# Patient Record
Sex: MALE | Race: WHITE | NOT HISPANIC OR LATINO | URBAN - METROPOLITAN AREA
[De-identification: names, ages, dates, MRNs, and addresses within clinical notes are randomized per-mention and may not be internally consistent; named-entity substitution may affect disease eponyms.]

---

## 2017-07-31 ENCOUNTER — EMERGENCY (EMERGENCY)
Facility: HOSPITAL | Age: 67
LOS: 1 days | Discharge: HOME | End: 2017-07-31
Admitting: EMERGENCY MEDICINE
Payer: MEDICARE

## 2017-07-31 VITALS
OXYGEN SATURATION: 95 % | DIASTOLIC BLOOD PRESSURE: 73 MMHG | SYSTOLIC BLOOD PRESSURE: 134 MMHG | RESPIRATION RATE: 16 BRPM | HEART RATE: 89 BPM

## 2017-07-31 DIAGNOSIS — Z04.8 ENCOUNTER FOR EXAMINATION AND OBSERVATION FOR OTHER SPECIFIED REASONS: ICD-10-CM

## 2017-07-31 PROCEDURE — 99283 EMERGENCY DEPT VISIT LOW MDM: CPT

## 2017-07-31 NOTE — ED ADULT NURSE REASSESSMENT NOTE - NS ED NURSE REASSESS COMMENT FT1
assumed care of patient from [facility]; pt sitting up in stretcher; awake and alert; pt is calling out but answers questions appropriately when asked; security nearby; will continue to monitor

## 2017-07-31 NOTE — ED PROVIDER NOTE - OBJECTIVE STATEMENT
patient BIB EMS after he was found sleeping on the street. patient states that he is in a methadone program and has not taken his medication is 3 days. denies fall, h/a. walks with a rolling walker. state that he went to [facility] 2 days for seeking methadone. patient otherwise uncooperative with history and states, "I want to get the fuck out of here"

## 2017-07-31 NOTE — ED PROVIDER NOTE - MEDICAL DECISION MAKING DETAILS
patient BIB EMS after he was found sleeping in the street. patient is requesting methadone. advised follow up with PCP and methadone clinic patient BIB EMS after he was found sleeping in the street. patient is requesting methadone. verbally abusive with staff. refusing to put on his pants in ED. advised follow up with PCP and methadone clinic

## 2017-07-31 NOTE — ED ADULT TRIAGE NOTE - CHIEF COMPLAINT QUOTE
as per EMS he was found sleeping in the street on his walker, states he has had no methadone in many days

## 2017-08-01 ENCOUNTER — INPATIENT (INPATIENT)
Facility: HOSPITAL | Age: 67
LOS: 8 days | Discharge: PSYCHIATRIC OR SUBSTANCE USE FACILITY | DRG: 91 | End: 2017-08-10
Attending: STUDENT IN AN ORGANIZED HEALTH CARE EDUCATION/TRAINING PROGRAM | Admitting: STUDENT IN AN ORGANIZED HEALTH CARE EDUCATION/TRAINING PROGRAM
Payer: MEDICARE

## 2017-08-01 VITALS
RESPIRATION RATE: 18 BRPM | DIASTOLIC BLOOD PRESSURE: 67 MMHG | OXYGEN SATURATION: 98 % | SYSTOLIC BLOOD PRESSURE: 126 MMHG | TEMPERATURE: 98 F | HEART RATE: 86 BPM

## 2017-08-01 DIAGNOSIS — R63.8 OTHER SYMPTOMS AND SIGNS CONCERNING FOOD AND FLUID INTAKE: ICD-10-CM

## 2017-08-01 DIAGNOSIS — B19.20 UNSPECIFIED VIRAL HEPATITIS C WITHOUT HEPATIC COMA: ICD-10-CM

## 2017-08-01 DIAGNOSIS — N18.9 CHRONIC KIDNEY DISEASE, UNSPECIFIED: ICD-10-CM

## 2017-08-01 DIAGNOSIS — F11.20 OPIOID DEPENDENCE, UNCOMPLICATED: ICD-10-CM

## 2017-08-01 DIAGNOSIS — R41.82 ALTERED MENTAL STATUS, UNSPECIFIED: ICD-10-CM

## 2017-08-01 DIAGNOSIS — K74.60 UNSPECIFIED CIRRHOSIS OF LIVER: ICD-10-CM

## 2017-08-01 LAB
ALBUMIN SERPL ELPH-MCNC: 3.2 G/DL — LOW (ref 3.4–5)
ALP SERPL-CCNC: 56 U/L — SIGNIFICANT CHANGE UP (ref 40–120)
ALT FLD-CCNC: 87 U/L — HIGH (ref 12–42)
AMMONIA BLD-MCNC: 20 UMOL/L — SIGNIFICANT CHANGE UP (ref 11–32)
ANION GAP SERPL CALC-SCNC: 8 MMOL/L — LOW (ref 9–16)
APPEARANCE UR: CLEAR — SIGNIFICANT CHANGE UP
APTT BLD: 30.1 SEC — SIGNIFICANT CHANGE UP (ref 27.5–36.5)
AST SERPL-CCNC: 82 U/L — HIGH (ref 15–37)
BASOPHILS NFR BLD AUTO: 0.5 % — SIGNIFICANT CHANGE UP (ref 0–2)
BILIRUB DIRECT SERPL-MCNC: 0.6 MG/DL — HIGH (ref 0–0.2)
BILIRUB SERPL-MCNC: 1.8 MG/DL — HIGH (ref 0.2–1.2)
BILIRUB UR-MCNC: (no result)
BUN SERPL-MCNC: 30 MG/DL — HIGH (ref 7–23)
CALCIUM SERPL-MCNC: 9.8 MG/DL — SIGNIFICANT CHANGE UP (ref 8.5–10.5)
CHLORIDE SERPL-SCNC: 102 MMOL/L — SIGNIFICANT CHANGE UP (ref 96–108)
CO2 SERPL-SCNC: 28 MMOL/L — SIGNIFICANT CHANGE UP (ref 22–31)
COLOR SPEC: YELLOW — SIGNIFICANT CHANGE UP
CREAT SERPL-MCNC: 1.62 MG/DL — HIGH (ref 0.5–1.3)
DIFF PNL FLD: NEGATIVE — SIGNIFICANT CHANGE UP
EOSINOPHIL NFR BLD AUTO: 0.1 % — SIGNIFICANT CHANGE UP (ref 0–6)
ETHANOL SERPL-MCNC: <3 MG/DL — SIGNIFICANT CHANGE UP
GLUCOSE SERPL-MCNC: 213 MG/DL — HIGH (ref 70–99)
GLUCOSE UR QL: NEGATIVE — SIGNIFICANT CHANGE UP
HCT VFR BLD CALC: 31.6 % — LOW (ref 39–50)
HGB BLD-MCNC: 11.2 G/DL — LOW (ref 13–17)
IMM GRANULOCYTES NFR BLD AUTO: 0.5 % — SIGNIFICANT CHANGE UP (ref 0–1.5)
INR BLD: 1.18 — HIGH (ref 0.88–1.16)
KETONES UR-MCNC: (no result) MG/DL
LACTATE SERPL-SCNC: 1.5 MMOL/L — SIGNIFICANT CHANGE UP (ref 0.4–2)
LEUKOCYTE ESTERASE UR-ACNC: NEGATIVE — SIGNIFICANT CHANGE UP
LYMPHOCYTES # BLD AUTO: 15.3 % — SIGNIFICANT CHANGE UP (ref 13–44)
MAGNESIUM SERPL-MCNC: 1.3 MG/DL — LOW (ref 1.6–2.6)
MCHC RBC-ENTMCNC: 32.1 PG — SIGNIFICANT CHANGE UP (ref 27–34)
MCHC RBC-ENTMCNC: 35.4 G/DL — SIGNIFICANT CHANGE UP (ref 32–36)
MCV RBC AUTO: 90.5 FL — SIGNIFICANT CHANGE UP (ref 80–100)
MONOCYTES NFR BLD AUTO: 7.8 % — SIGNIFICANT CHANGE UP (ref 2–14)
NEUTROPHILS NFR BLD AUTO: 75.8 % — SIGNIFICANT CHANGE UP (ref 43–77)
NITRITE UR-MCNC: NEGATIVE — SIGNIFICANT CHANGE UP
PH UR: 5.5 — SIGNIFICANT CHANGE UP (ref 5–8)
PLATELET # BLD AUTO: 218 K/UL — SIGNIFICANT CHANGE UP (ref 150–400)
POTASSIUM SERPL-MCNC: 4.4 MMOL/L — SIGNIFICANT CHANGE UP (ref 3.5–5.3)
POTASSIUM SERPL-SCNC: 4.4 MMOL/L — SIGNIFICANT CHANGE UP (ref 3.5–5.3)
PROT SERPL-MCNC: 7.6 G/DL — SIGNIFICANT CHANGE UP (ref 6.4–8.2)
PROT UR-MCNC: 30 MG/DL
PROTHROM AB SERPL-ACNC: 13 SEC — HIGH (ref 9.8–12.7)
RBC # BLD: 3.49 M/UL — LOW (ref 4.2–5.8)
RBC # FLD: 13.5 % — SIGNIFICANT CHANGE UP (ref 10.3–16.9)
SODIUM SERPL-SCNC: 138 MMOL/L — SIGNIFICANT CHANGE UP (ref 132–145)
SP GR SPEC: >=1.03 — SIGNIFICANT CHANGE UP (ref 1–1.03)
UROBILINOGEN FLD QL: 4 E.U./DL
WBC # BLD: 8.1 K/UL — SIGNIFICANT CHANGE UP (ref 3.8–10.5)
WBC # FLD AUTO: 8.1 K/UL — SIGNIFICANT CHANGE UP (ref 3.8–10.5)

## 2017-08-01 PROCEDURE — 71010: CPT | Mod: 26

## 2017-08-01 PROCEDURE — 70450 CT HEAD/BRAIN W/O DYE: CPT | Mod: 26

## 2017-08-01 PROCEDURE — 90792 PSYCH DIAG EVAL W/MED SRVCS: CPT

## 2017-08-01 PROCEDURE — 99285 EMERGENCY DEPT VISIT HI MDM: CPT

## 2017-08-01 PROCEDURE — 99223 1ST HOSP IP/OBS HIGH 75: CPT | Mod: GC

## 2017-08-01 RX ORDER — THIAMINE MONONITRATE (VIT B1) 100 MG
100 TABLET ORAL ONCE
Qty: 0 | Refills: 0 | Status: COMPLETED | OUTPATIENT
Start: 2017-08-01 | End: 2017-08-01

## 2017-08-01 RX ORDER — SODIUM CHLORIDE 9 MG/ML
1000 INJECTION INTRAMUSCULAR; INTRAVENOUS; SUBCUTANEOUS ONCE
Qty: 0 | Refills: 0 | Status: COMPLETED | OUTPATIENT
Start: 2017-08-01 | End: 2017-08-01

## 2017-08-01 RX ORDER — DEXTROSE 50 % IN WATER 50 %
12.5 SYRINGE (ML) INTRAVENOUS ONCE
Qty: 0 | Refills: 0 | Status: DISCONTINUED | OUTPATIENT
Start: 2017-08-01 | End: 2017-08-04

## 2017-08-01 RX ORDER — DEXTROSE 50 % IN WATER 50 %
25 SYRINGE (ML) INTRAVENOUS ONCE
Qty: 0 | Refills: 0 | Status: DISCONTINUED | OUTPATIENT
Start: 2017-08-01 | End: 2017-08-04

## 2017-08-01 RX ORDER — ALPRAZOLAM 0.25 MG
2 TABLET ORAL ONCE
Qty: 0 | Refills: 0 | Status: DISCONTINUED | OUTPATIENT
Start: 2017-08-01 | End: 2017-08-01

## 2017-08-01 RX ORDER — GLUCAGON INJECTION, SOLUTION 0.5 MG/.1ML
1 INJECTION, SOLUTION SUBCUTANEOUS ONCE
Qty: 0 | Refills: 0 | Status: DISCONTINUED | OUTPATIENT
Start: 2017-08-01 | End: 2017-08-04

## 2017-08-01 RX ORDER — METHADONE HYDROCHLORIDE 40 MG/1
210 TABLET ORAL ONCE
Qty: 0 | Refills: 0 | Status: DISCONTINUED | OUTPATIENT
Start: 2017-08-01 | End: 2017-08-01

## 2017-08-01 RX ORDER — DEXTROSE 50 % IN WATER 50 %
1 SYRINGE (ML) INTRAVENOUS ONCE
Qty: 0 | Refills: 0 | Status: DISCONTINUED | OUTPATIENT
Start: 2017-08-01 | End: 2017-08-04

## 2017-08-01 RX ORDER — SODIUM CHLORIDE 9 MG/ML
1000 INJECTION INTRAMUSCULAR; INTRAVENOUS; SUBCUTANEOUS
Qty: 0 | Refills: 0 | Status: DISCONTINUED | OUTPATIENT
Start: 2017-08-01 | End: 2017-08-04

## 2017-08-01 RX ORDER — SODIUM CHLORIDE 9 MG/ML
1000 INJECTION, SOLUTION INTRAVENOUS
Qty: 0 | Refills: 0 | Status: DISCONTINUED | OUTPATIENT
Start: 2017-08-01 | End: 2017-08-04

## 2017-08-01 RX ORDER — MAGNESIUM SULFATE 500 MG/ML
4 VIAL (ML) INJECTION ONCE
Qty: 0 | Refills: 0 | Status: DISCONTINUED | OUTPATIENT
Start: 2017-08-01 | End: 2017-08-04

## 2017-08-01 RX ORDER — HALOPERIDOL DECANOATE 100 MG/ML
2 INJECTION INTRAMUSCULAR EVERY 4 HOURS
Qty: 0 | Refills: 0 | Status: DISCONTINUED | OUTPATIENT
Start: 2017-08-01 | End: 2017-08-04

## 2017-08-01 RX ORDER — METHADONE HYDROCHLORIDE 40 MG/1
100 TABLET ORAL DAILY
Qty: 0 | Refills: 0 | Status: DISCONTINUED | OUTPATIENT
Start: 2017-08-02 | End: 2017-08-02

## 2017-08-01 RX ORDER — BACITRACIN ZINC 500 UNIT/G
1 OINTMENT IN PACKET (EA) TOPICAL ONCE
Qty: 0 | Refills: 0 | Status: COMPLETED | OUTPATIENT
Start: 2017-08-01 | End: 2017-08-01

## 2017-08-01 RX ORDER — THIAMINE MONONITRATE (VIT B1) 100 MG
100 TABLET ORAL THREE TIMES A DAY
Qty: 0 | Refills: 0 | Status: DISCONTINUED | OUTPATIENT
Start: 2017-08-01 | End: 2017-08-10

## 2017-08-01 RX ORDER — HALOPERIDOL DECANOATE 100 MG/ML
0.5 INJECTION INTRAMUSCULAR EVERY 4 HOURS
Qty: 0 | Refills: 0 | Status: DISCONTINUED | OUTPATIENT
Start: 2017-08-01 | End: 2017-08-02

## 2017-08-01 RX ORDER — INSULIN LISPRO 100/ML
VIAL (ML) SUBCUTANEOUS
Qty: 0 | Refills: 0 | Status: DISCONTINUED | OUTPATIENT
Start: 2017-08-01 | End: 2017-08-04

## 2017-08-01 RX ADMIN — Medication 100 MILLIGRAM(S): at 02:54

## 2017-08-01 RX ADMIN — Medication 1 APPLICATION(S): at 04:01

## 2017-08-01 RX ADMIN — Medication 100 MILLIGRAM(S): at 22:42

## 2017-08-01 RX ADMIN — SODIUM CHLORIDE 1000 MILLILITER(S): 9 INJECTION INTRAMUSCULAR; INTRAVENOUS; SUBCUTANEOUS at 02:42

## 2017-08-01 RX ADMIN — SODIUM CHLORIDE 1000 MILLILITER(S): 9 INJECTION INTRAMUSCULAR; INTRAVENOUS; SUBCUTANEOUS at 04:31

## 2017-08-01 RX ADMIN — Medication 2 MILLIGRAM(S): at 07:43

## 2017-08-01 RX ADMIN — METHADONE HYDROCHLORIDE 210 MILLIGRAM(S): 40 TABLET ORAL at 12:08

## 2017-08-01 RX ADMIN — Medication 2 MILLIGRAM(S): at 02:54

## 2017-08-01 NOTE — PROVIDER CONTACT NOTE (OTHER) - ASSESSMENT
Patient ambulated safely to bathroom with aide at side, and patient tried to stand up from toilet and legs were weak, nursing assistant was able to safely hold patient up without him falling and assisted into a chair by nursing staff. Vitals 124/71 HR 98 Oxygen 94% on room air. MD Childress to bedside and no intervention at this time. Patient received large home dose of methadone earlier and was combative and anxious. Will monitor.

## 2017-08-01 NOTE — ED PROVIDER NOTE - PHYSICAL EXAMINATION
patient refusing to be examine, verbally abusive with staff  noted to have abrasion to the top of the head and L forarm, dry, no active bleeding

## 2017-08-01 NOTE — H&P ADULT - NSHPPHYSICALEXAM_GEN_ALL_CORE
Patient declined majority of physical exam.     Patient was seated over side of bed, agitated, non-compliant with questions or exam.   He is unkempt - there is an abrasion over his head that he asked us to feel and another on his left arm and back.     Patient has a tremor at rest - no evidence of flap asterixis.    A&O x 2-3 (knows name, date, and that he is in the hospital, but not [facility]).

## 2017-08-01 NOTE — BEHAVIORAL HEALTH ASSESSMENT NOTE - NSBHCHARTREVIEWVS_PSY_A_CORE FT
Vital Signs Last 24 Hrs  T(C): 36.6 (01 Aug 2017 09:35), Max: 36.7 (01 Aug 2017 01:23)  T(F): 97.8 (01 Aug 2017 09:35), Max: 98.1 (01 Aug 2017 05:58)  HR: 78 (01 Aug 2017 09:35) (77 - 89)  BP: 135/65 (01 Aug 2017 09:35) (126/67 - 139/79)  BP(mean): --  RR: 16 (01 Aug 2017 09:35) (16 - 18)  SpO2: 98% (01 Aug 2017 09:35) (95% - 98%)

## 2017-08-01 NOTE — ED PROVIDER NOTE - PROGRESS NOTE DETAILS
patient refusing CT. his son was contacted and will be coming to the ED to pick him up. police at bedside as he has been reported as a missing person patient's son came to ED. states that he has history of ex heroine use on methadone 240mg daily, has not taken his methadone in 4 days, hep c new diagnosed cirrhosis, mild dementia. has been in 3 EDs in the past week [facility]  for agitiation. states that he has been acting confused, paranoid, and agitated for the past week. patient's son came to ED. states that he has history of ex heroine use on methadone 240mg daily, has not taken his methadone in 4 days, hep c new diagnosed cirrhosis, CKD, mild dementia. has been in 3 EDs in the past week [facility]  for agitiation. states that he has been acting confused, paranoid, and agitated for the past week. spoke with neurosurgery PA, recommending 3 hour repeat CT

## 2017-08-01 NOTE — H&P ADULT - ATTENDING COMMENTS
Pt seen by me in the room. Refused my exam. Agree with housestaff's exam/a/p as noted above with collateral information obtained via son. Appreciate psych consult with assistance of methadone management. labs reviewed.   a/p:  1. Encelopathy likely 2/2 worsening of dementia: check reversible causes including B12/folate/RPR/TSH; obtain collateral information from PMD;   2. hx of IVDU: decrease methadone as per psych, haldol prn for agitation, monitor for BDZ w/d  3. ?Hep C cirrhosis  4. ISAIAS: trend cr  SW consult in AM

## 2017-08-01 NOTE — ED ADULT NURSE NOTE - CHIEF COMPLAINT QUOTE
Pt BIBA states he tripped, near [address] [facility] and [address] [place] [address]. Abrasions noted to hi L forearm, pt states it was from a previous incident

## 2017-08-01 NOTE — ED PROVIDER NOTE - MEDICAL DECISION MAKING DETAILS
patient BIB EMS for fall, refusing to be evaluated. patient BIB EMS for fall this evening PMHx Hep C, cirrhosis, dementia on methadone with worsening mental status changes. CT head shows cortical contusion in the frontal lobe.

## 2017-08-01 NOTE — H&P ADULT - PROBLEM SELECTOR PLAN 4
Patient's son reports diagnosed, but untreated Hepatitis C.   Patient says he was diagnosed 7 years ago.

## 2017-08-01 NOTE — H&P ADULT - ASSESSMENT
65yo male PMHx chronic methadone use in the setting of previous heroin abuse, Hep C (context of diagnosis unknown, untreated), recently diagnosed liver cirrhosis, CKD, and mild dementia who presented as a transfer from [facility] ED with history of a fall and altered mentation, agitation, and paranoia. His son reported him as a missing person to the Police one week ago. Given limited capacity, patient should be evaluated by psych for altered mental status.

## 2017-08-01 NOTE — BEHAVIORAL HEALTH ASSESSMENT NOTE - NSBHCHARTREVIEWLAB_PSY_A_CORE FT
11.2   8.1   )-----------( 218      ( 01 Aug 2017 02:59 )             31.6   08-01    138  |  102  |  30<H>  ----------------------------<  213<H>  4.4   |  28  |  1.62<H>    Ca    9.8      01 Aug 2017 02:59  Mg     1.3     08-01    TPro  7.6  /  Alb  3.2<L>  /  TBili  1.8<H>  /  DBili  x   /  AST  82<H>  /  ALT  87<H>  /  AlkPhos  56  08-01    alcohol level negative

## 2017-08-01 NOTE — ED PROVIDER NOTE - OBJECTIVE STATEMENT
patient BIB EMS for fall from his rolling walker. states that he was sitting on his rolling walking and fell. hitting the back of his head, and L arm. denies LOC dizziness. patient other wise uncooperative with history and exam. patient seen earlier in this ED after he was found sleeping in the street.

## 2017-08-01 NOTE — DIETITIAN INITIAL EVALUATION ADULT. - ENERGY NEEDS
IBW 75.5Kg  %IBW 81%  BMI 19.4    Utilized ABW to calculate needs, pt falls within % of IBW. Adjusted for age.

## 2017-08-01 NOTE — ED PROVIDER NOTE - ATTENDING CONTRIBUTION TO CARE
patient BIB EMS for fall from his rolling walker. states that he was sitting on his rolling walking and fell. hitting the back of his head, and L arm. denies LOC dizziness. patient other wise uncooperative with history and exam. patient seen earlier in this ED after he was found sleeping in the street. Son relates pt has become increasingly confused, belligerent over past 3 weeks and was dx'd with dementia a month ago.    VSS, afebrile.    exam: mood hostile, uncooperative with staff/examiner. shouting profanities and belligerent. Pupils 4mm ou and rrla, eomi intact, neg APD, neck supple  lungs: no respiratory distress, clear bilat, cor: rrr without m/c/r.  neuro: neg asterixis, moving all 4 extremities, strength 5/5 UE/LE grossly., Neg dysarthria/ataxia.    Diff dx : Wernicke's, alzheimers, infectious, ICH or mass, metabolic derangement, uti. patient BIB EMS for fall from his rolling walker. states that he was sitting on his rolling walking and fell. hitting the back of his head, and L arm. denies LOC dizziness. patient other wise uncooperative with history and exam. patient seen earlier in this ED after he was found sleeping in the street. Son relates pt has become increasingly confused, belligerent over past 3 weeks and was dx'd with dementia a month ago.    VSS, afebrile.    exam: mood hostile, uncooperative with staff/examiner. shouting profanities and belligerent. Pupils 4mm ou and rrla, eomi intact, neg APD, neck supple  lungs: no respiratory distress, clear bilat, cor: rrr without m/c/r.  neuro: neg asterixis, moving all 4 extremities, strength 5/5 UE/LE grossly., +dysarthria/ataxia.    Diff dx : Wernicke's, alzheimers, infectious, ICH or mass, metabolic derangement, uti.

## 2017-08-01 NOTE — BEHAVIORAL HEALTH ASSESSMENT NOTE - NSBHCONSULTRECOMMENDOTHER_PSY_A_CORE FT
5. Check B12/folate/RPR/TSH for reversible causes of dementia  6. Would advise repeat HCT within 24 hours to monitor cortical contusion  7. Utox 5. Check B12/folate/RPR/TSH for reversible causes of dementia  6. Would advise repeat HCT within 24 hours to monitor cortical contusion  7. Utox  8. Collateral from patient's methadone clinic  5. Check B12/folate/RPR/TSH for reversible causes of dementia  6. Would advise repeat head CT within 24 hours to monitor cortical contusion  7. Utox  8. Collateral from patient's methadone clinic

## 2017-08-01 NOTE — BEHAVIORAL HEALTH ASSESSMENT NOTE - DIFFERENTIAL
Delirium vs. TBI vs. Alzheimers dementia vs. Wernickes vs. vascular dementia Delirium vs. TBI vs. Alzheimers dementia vs. vascular dementia

## 2017-08-01 NOTE — H&P ADULT - NSHPSOCIALHISTORY_GEN_ALL_CORE
Patient reports living at home, but son has not seen him in about a week. Son filed a missing person report with the police.   Per patient he has been on methadone since age 20. Ex heroin user.   Refused to answer re alcohol and smoking.

## 2017-08-01 NOTE — BEHAVIORAL HEALTH ASSESSMENT NOTE - HPI (INCLUDE ILLNESS QUALITY, SEVERITY, DURATION, TIMING, CONTEXT, MODIFYING FACTORS, ASSOCIATED SIGNS AND SYMPTOMS)
66M PPH heroin use disorder on methadone, PMH HCV, CKD, cirrhosis, BIBEMS after fall from walker, found to be agitated, admitted to medicine for evaluation.  HCT significant for small L frontal cortical contusion.  Psychiatry consulted to advise on patient's methadone regimen, management of agitation, and etiology of behavior.    Unable to interview patient as he was deeply asleep in setting of receiving methadone 210 mg one hour earlier.  Per 1:1 at bedside and patient's nurse, patient had attempted to elope twice since admission this morning.  1:1 reports patient fell asleep while eating and had to be physically moved into bed.    Per collateral obtained from patient's son in ED, patient was diagnosed with dementia one month ago and has been increasingly agitated last three weeks.  He has gone to multiple emergency rooms for agitation.  Patient last took methadone on 7/27.    Left message for patient's son Roman at [phone]. 66M PPH heroin use disorder on methadone, PMH HCV, CKD, cirrhosis, BIBEMS after fall from walker, found to be agitated, admitted to medicine for evaluation.  HCT significant for small L frontal cortical contusion.  Psychiatry consulted to advise on patient's methadone regimen, management of agitation, and etiology of behavior.    Unable to interview patient as he was deeply asleep in setting of receiving methadone 210 mg one hour earlier.  Per 1:1 at bedside and patient's nurse, patient had attempted to elope twice since admission this morning.  1:1 reports patient fell asleep while eating and had to be physically moved into bed.    Per collateral obtained from patient's son in ED, patient was diagnosed with dementia one month ago and has been increasingly agitated last three weeks.  He has gone to multiple emergency rooms for agitation.  Patient last took methadone on 7/27.    Spoke to patient's son Roman at [phone].  Roman reports patient was diagnosed with dementia prior to patient's wife's death 2 years ago.  Reports patient has been becoming progressively more paranoid and forgetful and these symptoms worsened in the last two weeks.  Says patient is unable to find his way around or take care of himself.  Patient's  at methadone program has told son that patient requires higher level of care and gave son name of nursing home that takes patients on methadone.  Son reports patient spends his days taking car service to and from methadone clinic, getting a sandwich, and then sleeping.  Reports he also takes Xanax 2 mg PO TID and Klonopin during the day prescribed by his PMD Dr. Bhanu Orozco in [place] although he is unsure how much patient is taking.  Son is unaware of alcohol or current other drug use.  Says yesterday patient left for methadone clinic at 630 am and did not return.  Son was calling ERs to look for him and has no idea how patient made it to [place].  Reports patient made a suicide attempt after his wife's passing 2 years ago via Xanax and Klonopin overdose and was hospitalized psychiatrically at [facility].    Confirmed Xanax 2 mg PO TID and Klonopin 1 mg PO daily as per Istop prescribed by Dr. Orozco. 66M PPH heroin use disorder on methadone, PMH HCV, CKD, cirrhosis, BIBEMS after fall from walker, found to be agitated, admitted to medicine for evaluation.  HCT significant for small L frontal cortical contusion.  Psychiatry consulted to advise on patient's methadone regimen, management of agitation, and etiology of behavior.    Unable to interview patient as he was deeply asleep in setting of receiving methadone 210 mg one hour earlier.  Per 1:1 at bedside and patient's nurse, patient had attempted to elope twice since admission this morning.  1:1 reports patient fell asleep while eating and had to be physically moved into bed.    Per collateral obtained from patient's son in ED, patient was diagnosed with dementia one month ago and has been increasingly agitated last three weeks.  He has gone to multiple emergency rooms for agitation.  Patient last took methadone on 7/27.    Spoke to patient's son Roman at [phone].  Roman reports patient was diagnosed with dementia prior to patient's wife's death 2 years ago.  Reports patient has been becoming progressively more paranoid and forgetful and these symptoms worsened in the last two weeks.  Says patient is unable to find his way around or take care of himself.  Patient's  at methadone program has told son that patient requires higher level of care and gave son name of nursing home that takes patients on methadone.  Son plans to bring 's contact info with him today.  Son reports patient spends his days taking car service to and from methadone clinic, getting a sandwich, and then sleeping.  Reports he also takes Xanax 2 mg PO TID and Klonopin during the day prescribed by his PMD Dr. Bhanu Orozco in [place] although he is unsure how much patient is taking.  Son is unaware of alcohol or current other drug use.  Says yesterday patient left for methadone clinic at 630 am and did not return.  Son was calling ERs to look for him and has no idea how patient made it to [place].  Reports patient made a suicide attempt after his wife's passing 2 years ago via Xanax and Klonopin overdose and was hospitalized psychiatrically at [facility].    Confirmed Xanax 2 mg PO TID and Klonopin 1 mg PO daily as per Istop prescribed by Dr. Orozco.

## 2017-08-01 NOTE — ED ADULT TRIAGE NOTE - CHIEF COMPLAINT QUOTE
Pt BIBA states he tripped, near [address] [facility] and [address] [place] [address]. Abrasions noted to hi L forearm, pt states it was from a previous incident

## 2017-08-01 NOTE — H&P ADULT - NSHPLABSRESULTS_GEN_ALL_CORE
Comprehensive Metabolic Panel (08.01.17 @ 02:59)    Sodium, Serum: 138 mmoL/L    Potassium, Serum: 4.4 mmol/L    Chloride, Serum: 102 mmol/L    Carbon Dioxide, Serum: 28 mmol/L    Anion Gap, Serum: 8 mmoL/L    Blood Urea Nitrogen, Serum: 30 mg/dL    Creatinine, Serum: 1.62 mg/dL    Glucose, Serum: 213 mg/dL    Calcium, Total Serum: 9.8 mg/dL    Protein Total, Serum: 7.6 g/dL    Albumin, Serum: 3.2 g/dL    Bilirubin Total, Serum: 1.8 mg/dL    Alkaline Phosphatase, Serum: 56 U/L    Aspartate Aminotransferase (AST/SGOT): 82 U/L    Alanine Aminotransferase (ALT/SGPT): 87 U/L    eGFR if Non : 44:    Ammonia, Serum (08.01.17 @ 02:59)    Ammonia, Serum: 20 umol/L    Prothrombin Time and INR, Plasma (08.01.17 @ 02:59)    Prothrombin Time, Plasma: 13.0:     INR: 1.18:

## 2017-08-01 NOTE — ED ADULT NURSE REASSESSMENT NOTE - NS ED NURSE REASSESS COMMENT FT1
PT assisted to the bathroom. Given urinal to collect urine sample.
Son at bedside. Pt being investigated for new onset altered mental status according to son. Pt is agitated. To be medicated as per JADON Fitch.
Pt going for xrays
Pts son called as family was looking for patient. Police at bedside as a missing person complaint was filed.

## 2017-08-01 NOTE — PROVIDER CONTACT NOTE (OTHER) - BACKGROUND
Patient Shivani here for methadone management and Hep C workup with increased confusion in room 753.

## 2017-08-01 NOTE — H&P ADULT - PMH
Hepatitis C  Unknown context of diagnosis Cirrhosis of liver    CKD (chronic kidney disease)    Hepatitis C  Unknown context of diagnosis  Methadone use

## 2017-08-01 NOTE — H&P ADULT - PROBLEM SELECTOR PLAN 1
Collateral obtained from son, Roman, contact number with nursing staff. Per Roman, patient has been altered, confused, and agitated for the last two weeks. He went missing one week ago, and Roman filed a missing person report with the Police. He was called by staff in [facility] ED, and he then accompanied patient to [facility] ED. He is due to Collateral obtained from son, Roman, contact number with nursing staff. Per Roman, patient has been altered, confused, and agitated for the last two weeks. He went missing one week ago, and Roman filed a missing person report with the Police. He was called by staff in [facility] ED, and he then accompanied patient to [facility] ED. He is due to see his father later today on the 7th floor.     - Patient received Ativan and Xanax in ED - Avoid Benzodiazepines, they can increase delirium.   - Admin Haloperidol 2mg PO or 0.5mg IV PRN for agitation per Psychiatry   - Patient due to be seen by Psychiatry, f/u recs Collateral obtained from son, Roman, contact number with nursing staff. Per Roman, patient has been altered, confused, and agitated for the last two weeks. He went missing one week ago, and Roman filed a missing person report with the Police. He was called by staff in [facility] ED, and he then accompanied patient to [facility] ED. He is due to see his father later today on the 7th floor.     - Patient received Ativan and Xanax in ED - Avoid Benzodiazepines, they can increase delirium.   - Admin Haloperidol 2mg PO or 0.5mg IV PRN for agitation per Psychiatry   - Patient due to be seen by Psychiatry, f/u recs  - F/u Urine Tox Screen

## 2017-08-01 NOTE — BEHAVIORAL HEALTH ASSESSMENT NOTE - SUMMARY
66M history of heroin use disorder on methadone and reported recent diagnosis of dementia, other psychiatric history unknown, PMH CKD, HCV, cirrhosis, presenting with declining cognitive status and agitation.  Labs and imaging not clearly indicative of etiology of patient's presentation; labs significant for mild renal disfunction and liver disfunction and HCT significant for minor frontal lobe contusion.  Alcohol level negative.  Further collateral is needed from patient's son on course of symptoms.  Would decrease methadone to 100 mg PO daily as patient notably sedated following dose and can give prn haldol as below for agitation.  Would monitor for progression of contusion and check utox and reversible causes of dementia as below.  If possible information about who made diagnosis of dementia and how would be helpful. 66M history of heroin use disorder on methadone and reported diagnosis of dementia, one prior psychiatric admission for SA via OD, PMH CKD, HCV, cirrhosis, presenting with declining cognitive status and agitation.  Labs and imaging not clearly indicative of etiology of patient's presentation; labs significant for mild renal dysfunction and liver dysfunction and HCT significant for minor frontal lobe contusion.  Alcohol level negative.  Based on son's collateral patient's functional status has been gradually declining indicative of dementing process.  Would decrease methadone to 100 mg PO daily as patient notably sedated following dose and can give prn haldol as below for agitation.  Patient is at risk for benzodiazepine withdrawal although unclear how much patient has been taking of Xanax and Klonopin.  Would monitor via CIWA and treat signs of withdrawal >8 with Ativan given liver disease.  Would monitor for progression of contusion and check utox and reversible causes of dementia as below.    Istop [id number]

## 2017-08-01 NOTE — BEHAVIORAL HEALTH ASSESSMENT NOTE - NSBHCONSULTMEDS_PSY_A_CORE FT
1. Would decrease methadone to 100 mg PO daily  2. Would give haldol 2 mg PO or IM q4h prn for agitation; offer PO first and if patient unwilling to take then give IM  3. Would avoid benzodiazepines and other deliriogenic medications (ie anticholingerics, opiates) given risk for worsening delirium/agitation  4. Start thiamine 100 mg PO TID 1. Would decrease methadone to 100 mg PO daily given patient's somnolence  2. Would monitor for signs/symptoms of benzodiazepine withdrawal/CIWA protocol and treat with Ativan given patient's liver dysfunction  3. Would give haldol 2 mg PO or IM q4h prn for agitation; offer PO first and if patient unwilling to take then give IM  4. Start thiamine 100 mg PO TID

## 2017-08-01 NOTE — DIETITIAN INITIAL EVALUATION ADULT. - OTHER INFO
67y/o M admitted s/p fall with AMS. Consult received for FTT. Pt seen OOB in chair with team at bedside. He is agitated and threatening to leave if he does not receive his methadone. RD assessment not appropriate at this time. He appears thin with noticeable loss of lean body mass. Recommend diet advancement with ONS once medically feasible per appropriate consistency. Will follow and reattempt assessment/diet education as appropriate.

## 2017-08-01 NOTE — H&P ADULT - PROBLEM SELECTOR PLAN 2
Patient is an ex heroin user, on daily methadone. Usual dose, per Methadone Clinic is 220mg daily. After calling clinic, they have recommended lowering the dose to 210mg in the setting of liver disease.    - Patient will be receiving 210 mg Methadone PO daily. Patient is an ex heroin user, on daily methadone. Usual dose, per Methadone Clinic is 220mg daily. After calling clinic, they have recommended lowering the dose to 210mg in the setting of liver disease.    - After 210mg, patient was very somnolent, impossible to arouse.   - Psych recommends decreasing dose to 100mg daily.

## 2017-08-01 NOTE — H&P ADULT - PROBLEM SELECTOR PLAN 3
Per son, patient was recently diagnosed with liver cirrhosis. Labs show AST/ALT 82/87 and raised bilirubin.    - Monitor LFTs

## 2017-08-02 DIAGNOSIS — N18.3 CHRONIC KIDNEY DISEASE, STAGE 3 (MODERATE): ICD-10-CM

## 2017-08-02 DIAGNOSIS — B19.20 UNSPECIFIED VIRAL HEPATITIS C WITHOUT HEPATIC COMA: ICD-10-CM

## 2017-08-02 DIAGNOSIS — F03.91 UNSPECIFIED DEMENTIA WITH BEHAVIORAL DISTURBANCE: ICD-10-CM

## 2017-08-02 DIAGNOSIS — G92 TOXIC ENCEPHALOPATHY: ICD-10-CM

## 2017-08-02 DIAGNOSIS — Z29.9 ENCOUNTER FOR PROPHYLACTIC MEASURES, UNSPECIFIED: ICD-10-CM

## 2017-08-02 DIAGNOSIS — F11.21 OPIOID DEPENDENCE, IN REMISSION: ICD-10-CM

## 2017-08-02 DIAGNOSIS — E46 UNSPECIFIED PROTEIN-CALORIE MALNUTRITION: ICD-10-CM

## 2017-08-02 DIAGNOSIS — K74.60 UNSPECIFIED CIRRHOSIS OF LIVER: ICD-10-CM

## 2017-08-02 DIAGNOSIS — R65.10 SYSTEMIC INFLAMMATORY RESPONSE SYNDROME (SIRS) OF NON-INFECTIOUS ORIGIN WITHOUT ACUTE ORGAN DYSFUNCTION: ICD-10-CM

## 2017-08-02 LAB
ALBUMIN SERPL ELPH-MCNC: 2.4 G/DL — LOW (ref 3.3–5)
ALP SERPL-CCNC: 51 U/L — SIGNIFICANT CHANGE UP (ref 40–120)
ALT FLD-CCNC: 52 U/L — HIGH (ref 10–45)
ANION GAP SERPL CALC-SCNC: 11 MMOL/L — SIGNIFICANT CHANGE UP (ref 5–17)
AST SERPL-CCNC: 63 U/L — HIGH (ref 10–40)
BILIRUB DIRECT SERPL-MCNC: 0.5 MG/DL — HIGH (ref 0–0.2)
BILIRUB SERPL-MCNC: 1 MG/DL — SIGNIFICANT CHANGE UP (ref 0.2–1.2)
BUN SERPL-MCNC: 11 MG/DL — SIGNIFICANT CHANGE UP (ref 7–23)
CALCIUM SERPL-MCNC: 8.3 MG/DL — LOW (ref 8.4–10.5)
CHLORIDE SERPL-SCNC: 102 MMOL/L — SIGNIFICANT CHANGE UP (ref 96–108)
CO2 SERPL-SCNC: 25 MMOL/L — SIGNIFICANT CHANGE UP (ref 22–31)
CREAT SERPL-MCNC: 1.1 MG/DL — SIGNIFICANT CHANGE UP (ref 0.5–1.3)
GLUCOSE SERPL-MCNC: 260 MG/DL — HIGH (ref 70–99)
HBA1C BLD-MCNC: 6.7 % — HIGH (ref 4–5.6)
HCT VFR BLD CALC: 27.5 % — LOW (ref 39–50)
HGB BLD-MCNC: 9.1 G/DL — LOW (ref 13–17)
MAGNESIUM SERPL-MCNC: 1.4 MG/DL — LOW (ref 1.6–2.6)
MCHC RBC-ENTMCNC: 31.3 PG — SIGNIFICANT CHANGE UP (ref 27–34)
MCHC RBC-ENTMCNC: 33.1 G/DL — SIGNIFICANT CHANGE UP (ref 32–36)
MCV RBC AUTO: 94.5 FL — SIGNIFICANT CHANGE UP (ref 80–100)
PLATELET # BLD AUTO: 203 K/UL — SIGNIFICANT CHANGE UP (ref 150–400)
POTASSIUM SERPL-MCNC: 4.4 MMOL/L — SIGNIFICANT CHANGE UP (ref 3.5–5.3)
POTASSIUM SERPL-SCNC: 4.4 MMOL/L — SIGNIFICANT CHANGE UP (ref 3.5–5.3)
PROT SERPL-MCNC: 5.9 G/DL — LOW (ref 6–8.3)
RBC # BLD: 2.91 M/UL — LOW (ref 4.2–5.8)
RBC # FLD: 13.8 % — SIGNIFICANT CHANGE UP (ref 10.3–16.9)
SODIUM SERPL-SCNC: 138 MMOL/L — SIGNIFICANT CHANGE UP (ref 135–145)
TSH SERPL-MCNC: 0.55 UIU/ML — SIGNIFICANT CHANGE UP (ref 0.35–4.94)
WBC # BLD: 6.4 K/UL — SIGNIFICANT CHANGE UP (ref 3.8–10.5)
WBC # FLD AUTO: 6.4 K/UL — SIGNIFICANT CHANGE UP (ref 3.8–10.5)

## 2017-08-02 PROCEDURE — 76705 ECHO EXAM OF ABDOMEN: CPT | Mod: 26

## 2017-08-02 PROCEDURE — 99233 SBSQ HOSP IP/OBS HIGH 50: CPT

## 2017-08-02 PROCEDURE — 93010 ELECTROCARDIOGRAM REPORT: CPT

## 2017-08-02 PROCEDURE — 71010: CPT | Mod: 26

## 2017-08-02 PROCEDURE — 70450 CT HEAD/BRAIN W/O DYE: CPT | Mod: 26

## 2017-08-02 RX ORDER — MAGNESIUM SULFATE 500 MG/ML
2 VIAL (ML) INJECTION ONCE
Qty: 0 | Refills: 0 | Status: COMPLETED | OUTPATIENT
Start: 2017-08-02 | End: 2017-08-02

## 2017-08-02 RX ORDER — METHADONE HYDROCHLORIDE 40 MG/1
50 TABLET ORAL ONCE
Qty: 0 | Refills: 0 | Status: DISCONTINUED | OUTPATIENT
Start: 2017-08-02 | End: 2017-08-02

## 2017-08-02 RX ORDER — QUETIAPINE FUMARATE 200 MG/1
50 TABLET, FILM COATED ORAL AT BEDTIME
Qty: 0 | Refills: 0 | Status: DISCONTINUED | OUTPATIENT
Start: 2017-08-02 | End: 2017-08-10

## 2017-08-02 RX ORDER — HALOPERIDOL DECANOATE 100 MG/ML
2 INJECTION INTRAMUSCULAR EVERY 4 HOURS
Qty: 0 | Refills: 0 | Status: DISCONTINUED | OUTPATIENT
Start: 2017-08-02 | End: 2017-08-07

## 2017-08-02 RX ORDER — QUETIAPINE FUMARATE 200 MG/1
100 TABLET, FILM COATED ORAL AT BEDTIME
Qty: 0 | Refills: 0 | Status: DISCONTINUED | OUTPATIENT
Start: 2017-08-02 | End: 2017-08-02

## 2017-08-02 RX ORDER — HEPARIN SODIUM 5000 [USP'U]/ML
5000 INJECTION INTRAVENOUS; SUBCUTANEOUS EVERY 8 HOURS
Qty: 0 | Refills: 0 | Status: DISCONTINUED | OUTPATIENT
Start: 2017-08-02 | End: 2017-08-10

## 2017-08-02 RX ORDER — METHADONE HYDROCHLORIDE 40 MG/1
150 TABLET ORAL DAILY
Qty: 0 | Refills: 0 | Status: DISCONTINUED | OUTPATIENT
Start: 2017-08-03 | End: 2017-08-03

## 2017-08-02 RX ORDER — FOLIC ACID 0.8 MG
1 TABLET ORAL DAILY
Qty: 0 | Refills: 0 | Status: DISCONTINUED | OUTPATIENT
Start: 2017-08-02 | End: 2017-08-10

## 2017-08-02 RX ADMIN — HEPARIN SODIUM 5000 UNIT(S): 5000 INJECTION INTRAVENOUS; SUBCUTANEOUS at 21:34

## 2017-08-02 RX ADMIN — METHADONE HYDROCHLORIDE 100 MILLIGRAM(S): 40 TABLET ORAL at 12:42

## 2017-08-02 RX ADMIN — METHADONE HYDROCHLORIDE 50 MILLIGRAM(S): 40 TABLET ORAL at 19:21

## 2017-08-02 RX ADMIN — Medication 100 GRAM(S): at 19:20

## 2017-08-02 RX ADMIN — QUETIAPINE FUMARATE 50 MILLIGRAM(S): 200 TABLET, FILM COATED ORAL at 21:34

## 2017-08-02 RX ADMIN — Medication 100 MILLIGRAM(S): at 21:35

## 2017-08-02 NOTE — PROGRESS NOTE ADULT - PROBLEM SELECTOR PLAN 7
F: No fluids indicated at this time; patient eating/drinking well  E: Replete electrolytes as needed; K>4; Mg>2  N: DASH diet    DVT ppx: Heparin SubQ 5000 u q8h (moderate risk)  CODE: FULL  Dispo: [id number] -Given patient's history of substance abuse, unlikely meets caloric needs; Nutrition consult called.  -DASH diet; replete electrolytes as needed; K>4, Mg>2 Patient presenting with Creatinine 1.25, GFR 44 on admission (CKD stage 3); unknown baseline. Will attempt to speak with patient's PMD and get additional history/records for patient.  -Creatinine 1.10 today; continue to trend.

## 2017-08-02 NOTE — PROGRESS NOTE ADULT - PROBLEM SELECTOR PROBLEM 6
Nutrition, metabolism, and development symptoms Stage 3 chronic kidney disease Dementia with behavioral disturbance, unspecified dementia type

## 2017-08-02 NOTE — PROGRESS NOTE ADULT - PROBLEM SELECTOR PLAN 6
DASH diet; replete electrolytes as needed; K>4, Mg>2 Patient presenting with Creatinine 1.25. Baseline Patient with reported history of dementia per patient's son. Son notes decline over the last couple of weeks. Will obtain collateral from patient's PMD as to whether patient has any underlying psychiatric issues, dementia.

## 2017-08-02 NOTE — PROGRESS NOTE ADULT - PROBLEM SELECTOR PROBLEM 5
Stage 3 chronic kidney disease Hepatitis C virus infection without hepatic coma, unspecified chronicity

## 2017-08-02 NOTE — PROGRESS NOTE ADULT - PROBLEM SELECTOR PLAN 5
Patient presenting with Creatinine 1.25. Baseline Patient with known history of hepatitis C (unknown diagnosis date, untreated).   -Will attempt to reach out to patient's PMD to obtain additional history regarding PMHx.

## 2017-08-02 NOTE — PROGRESS NOTE ADULT - PROBLEM SELECTOR PLAN 5
Patient is on 220 mg of Methadone maintenance therapy. Considering patient underlying hepatic dysfunction along with presentation of encephalopathy would avoid such high dose due to its long half/life  -Methadone 100 mg PO QD

## 2017-08-02 NOTE — PROGRESS NOTE ADULT - PROBLEM SELECTOR PLAN 9
F: No fluids indicated at this time; patient eating/drinking well in hospital  E: Replete electrolytes as needed; K>4; Mg>2  N: DASH diet    DVT ppx: Heparin SubQ 5000 u q8h (moderate risk)  CODE: FULL  Dispo: [facility]

## 2017-08-02 NOTE — PROGRESS NOTE ADULT - SUBJECTIVE AND OBJECTIVE BOX
Patient is a 66y old  Male who presents with a chief complaint of Fall    INTERVAL HPI/OVERNIGHT EVENTS:    Review of Systems: 12 point review of systems otherwise negative  ( - )fevers/chills  ( - ) dyspnea  ( - ) cough  ( - ) chest pain  ( - ) palpatations  ( - ) dizziness/lightheadedness  ( - ) nausea/vomiting  ( - ) abd pain  ( - ) diarrhea  ( - ) melena  ( - ) hematochezia  ( - ) dysuria  ( - ) hematuria  ( - ) leg swelling  ( -) calf tenderness  ( - ) motor weakness  ( - ) extremity numbness  ( - ) back pain  ( + ) tolerating POs  ( + ) BM    MEDICATIONS  (STANDING):  methadone    Tablet 100 milliGRAM(s) Oral daily  thiamine 100 milliGRAM(s) Oral three times a day  folic acid 1 milliGRAM(s) Oral daily  multivitamin 1 Tablet(s) Oral daily  heparin  Injectable 5000 Unit(s) SubCutaneous every 8 hours  QUEtiapine 50 milliGRAM(s) Oral at bedtime    MEDICATIONS  (PRN):  haloperidol     Tablet 2 milliGRAM(s) Oral every 4 hours PRN agitation  haloperidol    Injectable 2 milliGRAM(s) IntraMuscular every 4 hours PRN Agitation      Allergies    No Known Allergies    Intolerances          Vital Signs Last 24 Hrs  T(C): 37.3 (02 Aug 2017 09:36), Max: 37.9 (01 Aug 2017 22:00)  T(F): 99.1 (02 Aug 2017 09:36), Max: 100.3 (01 Aug 2017 22:00)  HR: 106 (02 Aug 2017 09:36) (98 - 106)  BP: 150/74 (02 Aug 2017 09:36) (124/71 - 152/70)  BP(mean): --  RR: 16 (02 Aug 2017 09:36) (14 - 16)  SpO2: 94% (02 Aug 2017 09:36) (94% - 94%)  CAPILLARY BLOOD GLUCOSE            Physical Exam:    Daily   General:  ill appearing, disheveled  temporal wasting   HEENT:  Nonicteric, PERRLA Dry MMM  CV:  RRR, no murmur, no JVD  Lungs:  CTA B/L, no wheezes, rales, rhonchi  Abdomen:  Soft, non-tender, no distended, positive BS, no hepatosplenomegaly  Extremities:  2+ pulses, no c/c, no edema mild abrasions B/L   Skin:  Warm and dry, no rashes  :  No day  Neuro:  AAOx3, non-focal, CN II-XII grossly intact  No Restraints    LABS:                        11.2   8.1   )-----------( 218      ( 01 Aug 2017 02:59 )             31.6     08-01    138  |  102  |  30<H>  ----------------------------<  213<H>  4.4   |  28  |  1.62<H>    Ca    9.8      01 Aug 2017 02:59  Mg     1.3     08-01    TPro  x   /  Alb  x   /  TBili  x   /  DBili  0.6<H>  /  AST  x   /  ALT  x   /  AlkPhos  x   08-01    PT/INR - ( 01 Aug 2017 02:59 )   PT: 13.0 sec;   INR: 1.18          PTT - ( 01 Aug 2017 02:59 )  PTT:30.1 sec  Urinalysis Basic - ( 01 Aug 2017 05:25 )    Color: Yellow / Appearance: Clear / SG: >=1.030 / pH: x  Gluc: x / Ketone: Trace mg/dL  / Bili: Moderate / Urobili: 4.0 E.U./dL   Blood: x / Protein: 30 mg/dL / Nitrite: NEGATIVE   Leuk Esterase: NEGATIVE / RBC: < 5 /HPF / WBC < 5 /HPF   Sq Epi: x / Non Sq Epi: Rare /HPF / Bacteria: Present /HPF        < from: Xray Chest 1 View AP/PA (08.01.17 @ 02:49) >  XRAY CHEST Right hemithorax volume loss as described above. No prior studies are   available for direct comparison.

## 2017-08-02 NOTE — PROGRESS NOTE ADULT - PROBLEM SELECTOR PLAN 1
Patient presented to [facility] with AMS s/p fall, agitation, paranoia. History of drug abuse (on methadone). Per patient's son, patient has been reported as a missing person for the last week. His behavior has been more erratic over the last two weeks and has been reported to have gone to multiple EDs the past week seeking methadone. AMS likely 2/2 to toxic metabolic encephalopathy of unclear source vs infection.  -Patient Patient presented to [facility] with AMS s/p fall, agitation, paranoia. History of drug abuse (on methadone). Per patient's son, patient has been reported as a missing person for the last week. His behavior has become more erratic over the last two weeks and has been reported to have gone to multiple EDs over the past week seeking methadone. AMS likely 2/2 to toxic metabolic encephalopathy of unclear source vs infectious etiology.  -Patient aggressive overnight, fell and hit his head. Repeat CT head w/o contrast done, no ICH noted.   -Psych on board; patient's behavior unlikely 2/2 to underlying psychiatric disorder. Recommend Seroquel 50 mg qHS for sundowning, Haldol PO 2 mg q4h PRN (first, if agitated overnight) or Haldol IM 2 mg q4h PRN (2nd option for agitation).  -Patient tremulous on exam today; patient with history of Benzo abuse at home (Ativan 2 mg TID), likely 2/2 to withdrawal; continue to monitor patient's CIWA q4h and give Ativan 1 mg PRN.

## 2017-08-02 NOTE — PROGRESS NOTE ADULT - PROBLEM SELECTOR PLAN 3
As per son patient suffers from unspecified dementia.  Has intermittent episodes of severe agitation and confusion.  Will need to obtain collateral from PMD   -Appreciate psychiatry recs

## 2017-08-02 NOTE — PROGRESS NOTE ADULT - PROBLEM SELECTOR PLAN 2
Patient meeting 2/4 SIRS criteria (temp>100.4, tachycardia). Of unclear etiology. Labs pending.   -Examination unremarkable for w/r/r; warm to palpation  -Blood cultures obtained and sent today, f/u results  -CXR Patient meeting 2/4 SIRS criteria (temp 100.3, tachycardia).  -Tmax 100.3, unclear etiology. Examination unremarkable for w/r/r; warm to palpation  -Day labs pending, f/u results   -Blood cultures obtained and sent today, f/u results  -CXR ordered to r/o underlying acute lung pathology that may be responsible for temperature. f/u official read Patient meeting 2/4 SIRS criteria (temp 100.3, tachycardia) overnight.  -Tmax 100.3, unclear etiology. Examination unremarkable for w/r/r; warm to palpation  -UA negative, unlikely urinary source.   -No WBC (6.3 - 8/2), unlikely infectious in etiology  -Blood cultures obtained and sent today, f/u results  -CXR revealing confluent opacity noted over the left lower lung zone which may represent developing left lower lobe pneumonia and/or atelectasis.

## 2017-08-02 NOTE — PROGRESS NOTE BEHAVIORAL HEALTH - NSBHCONSULTRECOMMENDOTHER_PSY_A_CORE FT
4. Coordination with outpatient provider/methadone clinic - potential nursing home placement 4. Repeat head CT  5. Coordination with outpatient provider/methadone clinic - potential nursing home placement 4. Repeat head CT  5. Check EKG for QTc  6. Coordination with outpatient provider/methadone clinic - potential nursing home placement

## 2017-08-02 NOTE — PROGRESS NOTE BEHAVIORAL HEALTH - NSBHFUPINTERVALHXFT_PSY_A_CORE
Overnight events noted as per chart and primary team.  Patient was combative overnight when staff helped him use bathroom.  No prns given.  Patient refused head CT or physical exam.    On interview this morning, patient calm and cooperative, eating breakfast with assistance of aide.  Patient reports he feels "fine" and does not remember events of night before.  Says he will agree to head CT and "whatever the doctors want."  Denies pain, nausea, headache, AH/VH.  AAOx1.  Does not know his own address.  Does not know current location.  Oriented to "17."  Has tremor but says this has been longstanding.    Son Roman at bedside, reports patient's combativeness overnight unsurprising although patient is not typically violent at home.  Says patient typically knows his own address.  Roman plans to call [facility] to check on potential placement today- was given this recommendation by methadone clinic.  Reports patient has not been eating much in last 2 weeks because has been sleeping through much of the day and has difficulty attending to ADLs.  Believes patient has sundowning overnight.

## 2017-08-02 NOTE — PROGRESS NOTE ADULT - PROBLEM SELECTOR PLAN 8
F: No fluids indicated at this time; patient eating/drinking well  E: Replete electrolytes as needed; K>4; Mg>2  N: DASH diet    DVT ppx: Heparin SubQ 5000 u q8h (moderate risk)  CODE: FULL  Dispo: [facility] -Patient's son notes patient has not been eating much in last 2 weeks as has been sleeping through much of the day; given he unlikely is meeting his daily caloric needs, nutrition consult called. f/u recs  -DASH diet; replete electrolytes as needed; K>4, Mg>2

## 2017-08-02 NOTE — PROGRESS NOTE ADULT - PROBLEM SELECTOR PLAN 4
Etiology of cirrhosis appears to be from hepatitis C infection.    Obtain collateral regarding extent of disease (hx of esophageal varices?)  -No ascitics present on my examination  -Keep patient euvolemic  -Obtain collateral  -Abdominal US

## 2017-08-02 NOTE — PROGRESS NOTE ADULT - PROBLEM SELECTOR PLAN 1
Etiology of encephalopathy multifactorial.  Patient having intermittent low grade fevers along with recently diagnosed cirrhosis along with underlying dementia and on high doses of methadone.  Currently mental status improving  -Treat infection if clear source identified  -Keep Euvolemic   -Replete electrolytes as needed  -Avoid sedative medications unless necessary  -B12/folate/RPR/TSH

## 2017-08-02 NOTE — PROGRESS NOTE ADULT - ASSESSMENT
67 y/o male PMHx of prior heroin abuse (on Methadone), Hepatitis C (dx date UNK, untreated), recently diagnosed liver cirrhosis, CKD (stage 3), mild dementia presenting on 8/1 as a transfer for [facility] ED with history of recent fall and altered mentation, agitation, and paranoia. CT head negative for intracranial bleed. AMS likely 2/2 to toxic metabolic encephalopathy of unclear etiology. Here in [facility] [place] for further management.

## 2017-08-02 NOTE — PROGRESS NOTE BEHAVIORAL HEALTH - SUMMARY
66M prior diagnosis of dementia, PMH CKD, HCV, cirrhosis, BIBEMS after leaving home.  Per son patient has years-long history of cognitive deterioration with recent more progressive decline.  Patient's behavioral disregulation likely in setting of sundowning although presentation complicated by history of benzodiazepine and methadone use, with likely some element of dehydration/malnutrition.  Patient did not appear delirious on interview this morning but had significant deficits in memory and orientation.  No evidence so far of benzodiazepine withdrawal.  Given patient's likely diagnosis of dementia, he is not appropriate for inpatient psychiatric admission at this time.  Patient would benefit from repeat head CT and coordination with /son for nursing home placement pending medical clearance.

## 2017-08-02 NOTE — PROGRESS NOTE ADULT - PROBLEM SELECTOR PLAN 4
Patient with known history of hepatitis C (unknown diagnosis date, untreated).   -Will attempt to reach out to patient's PMD to obtain additional history regarding PMHx. Patient with prior history of heroin abuse; on Methadone 220 mg since age 20 (verified with methadone clinic/patient's son). Son notes that patient usually somnolent for the majority of the day after dosing. Will continue with methadone however in light of patient's liver pathology and the drug being hepatically metabolized, will reduce dose to 150 mg daily. Continue to monitor patient for withdrawal symptoms at reduced dose.  -EKG ordered to monitor for QT prolongation, f/u results.

## 2017-08-02 NOTE — PROGRESS NOTE ADULT - ASSESSMENT
65 y/o male PMHx of prior heroin abuse (on Methadone), Hepatitis C (dx date UNK, untreated), recently diagnosed liver cirrhosis, CKD (stage 3), mild dementia presents for AMS
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
## 2017-08-02 NOTE — PROGRESS NOTE BEHAVIORAL HEALTH - NSBHCONSULTMEDS_PSY_A_CORE FT
1. Would start Seroquel 100 mg PO HS for sundowning  2. Continue haldol 2 mg PO or IM q4h prn for agitation  3. Continue to monitor for signs/symptoms of benzodiazepine withdrawal ([facility] protocol) 1. Would start Seroquel 50 mg PO HS for sundowning  2. Continue haldol 2 mg PO or IM q4h prn for agitation  3. Continue to monitor for signs/symptoms of benzodiazepine withdrawal ([facility] protocol)

## 2017-08-02 NOTE — PROGRESS NOTE ADULT - SUBJECTIVE AND OBJECTIVE BOX
OVERNIGHT EVENTS: Patient aggressive and combative with staff overnight when being escorted to the bathroom, fell on bathroom floor, hitting head against the wall. CT Head w/o contrast ordered for AM to r/o intracranial bleed.    SUBJECTIVE / INTERVAL HPI: Patient seen and examined at bedside. Patient does not endorse any pain after fall this morning. Remainder of ROS negative.     VITAL SIGNS:  Vital Signs Last 24 Hrs  T(C): 37.3 (02 Aug 2017 09:36), Max: 37.9 (01 Aug 2017 22:00)  T(F): 99.1 (02 Aug 2017 09:36), Max: 100.3 (01 Aug 2017 22:00)  HR: 106 (02 Aug 2017 09:36) (98 - 106)  BP: 150/74 (02 Aug 2017 09:36) (124/71 - 152/70)  BP(mean): --  RR: 16 (02 Aug 2017 09:36) (14 - 16)  SpO2: 94% (02 Aug 2017 09:36) (94% - 94%)    PHYSICAL EXAM:    General: WDWN, lying in bed, tremulous, not diaphoretic, disheveled  HEENT: NC/AT; PERRL, anicteric sclera; MMM, poor dentition   Neck: supple  Cardiovascular: +S1/S2; RRR, no m/r/g appreciated on auscultation  Respiratory: CTA B/L; no W/R/R  Gastrointestinal: soft, NT/ND; hepatomegaly; no ascites  Extremities:  no edema, clubbing or cyanosis  Vascular: 2+ radial, DP/PT pulses B/L  Neurological: AAOx3; no focal deficits  Derm: no spider angiomas noted, healed scabs noted on legs (likely from prior fall), no other visible lacerations/abrasions noted     MEDICATIONS:  MEDICATIONS  (STANDING):  methadone    Tablet 100 milliGRAM(s) Oral daily  thiamine 100 milliGRAM(s) Oral three times a day  folic acid 1 milliGRAM(s) Oral daily  multivitamin 1 Tablet(s) Oral daily  heparin  Injectable 5000 Unit(s) SubCutaneous every 8 hours  QUEtiapine 50 milliGRAM(s) Oral at bedtime    MEDICATIONS  (PRN):  haloperidol     Tablet 2 milliGRAM(s) Oral every 4 hours PRN agitation  haloperidol    Injectable 2 milliGRAM(s) IntraMuscular every 4 hours PRN Agitation      ALLERGIES:  Allergies    No Known Allergies    Intolerances        LABS:                        11.2   8.1   )-----------( 218      ( 01 Aug 2017 02:59 )             31.6     08-01    138  |  102  |  30<H>  ----------------------------<  213<H>  4.4   |  28  |  1.62<H>    Ca    9.8      01 Aug 2017 02:59  Mg     1.3     08-01    TPro  x   /  Alb  x   /  TBili  x   /  DBili  0.6<H>  /  AST  x   /  ALT  x   /  AlkPhos  x   08-01    PT/INR - ( 01 Aug 2017 02:59 )   PT: 13.0 sec;   INR: 1.18          PTT - ( 01 Aug 2017 02:59 )  PTT:30.1 sec  Urinalysis Basic - ( 01 Aug 2017 05:25 )    Color: Yellow / Appearance: Clear / SG: >=1.030 / pH: x  Gluc: x / Ketone: Trace mg/dL  / Bili: Moderate / Urobili: 4.0 E.U./dL   Blood: x / Protein: 30 mg/dL / Nitrite: NEGATIVE   Leuk Esterase: NEGATIVE / RBC: < 5 /HPF / WBC < 5 /HPF   Sq Epi: x / Non Sq Epi: Rare /HPF / Bacteria: Present /HPF      CAPILLARY BLOOD GLUCOSE  214 (01 Aug 2017 02:07)          RADIOLOGY & ADDITIONAL TESTS: Reviewed.    ASSESSMENT:    PLAN: OVERNIGHT EVENTS: Patient aggressive and combative with staff overnight when being escorted to the bathroom, fell on bathroom floor, hitting head against the wall. CT Head w/o contrast ordered for AM to r/o intracranial bleed.    SUBJECTIVE / INTERVAL HPI: Patient seen and examined at bedside. Patient does not endorse any pain after fall this morning. Remainder of ROS negative.     VITAL SIGNS:  Vital Signs Last 24 Hrs  T(C): 37.3 (02 Aug 2017 09:36), Max: 37.9 (01 Aug 2017 22:00)  T(F): 99.1 (02 Aug 2017 09:36), Max: 100.3 (01 Aug 2017 22:00)  HR: 106 (02 Aug 2017 09:36) (98 - 106)  BP: 150/74 (02 Aug 2017 09:36) (124/71 - 152/70)  BP(mean): --  RR: 16 (02 Aug 2017 09:36) (14 - 16)  SpO2: 94% (02 Aug 2017 09:36) (94% - 94%)    PHYSICAL EXAM:    General: WDWN, lying in bed, tremulous, not diaphoretic, disheveled  HEENT: NC/AT; PERRL, anicteric sclera; MMM, poor dentition, tremor of lower lip   Neck: supple  Cardiovascular: +S1/S2; RRR, no m/r/g appreciated on auscultation  Respiratory: CTA B/L; no W/R/R  Gastrointestinal: soft, NT/ND; hepatomegaly; no ascites  Extremities:  no edema, clubbing or cyanosis  Vascular: 2+ radial, DP/PT pulses B/L  Neurological: AAOx2; no focal deficits  Derm: no spider angiomas noted, healed scabs noted on legs (likely from prior fall), no other visible lacerations/abrasions noted     MEDICATIONS:  MEDICATIONS  (STANDING):  methadone    Tablet 100 milliGRAM(s) Oral daily  thiamine 100 milliGRAM(s) Oral three times a day  folic acid 1 milliGRAM(s) Oral daily  multivitamin 1 Tablet(s) Oral daily  heparin  Injectable 5000 Unit(s) SubCutaneous every 8 hours  QUEtiapine 50 milliGRAM(s) Oral at bedtime    MEDICATIONS  (PRN):  haloperidol     Tablet 2 milliGRAM(s) Oral every 4 hours PRN agitation  haloperidol    Injectable 2 milliGRAM(s) IntraMuscular every 4 hours PRN Agitation      ALLERGIES:  Allergies    No Known Allergies    Intolerances        LABS:                        11.2   8.1   )-----------( 218      ( 01 Aug 2017 02:59 )             31.6     08-01    138  |  102  |  30<H>  ----------------------------<  213<H>  4.4   |  28  |  1.62<H>    Ca    9.8      01 Aug 2017 02:59  Mg     1.3     08-01    TPro  x   /  Alb  x   /  TBili  x   /  DBili  0.6<H>  /  AST  x   /  ALT  x   /  AlkPhos  x   08-01    PT/INR - ( 01 Aug 2017 02:59 )   PT: 13.0 sec;   INR: 1.18          PTT - ( 01 Aug 2017 02:59 )  PTT:30.1 sec  Urinalysis Basic - ( 01 Aug 2017 05:25 )    Color: Yellow / Appearance: Clear / SG: >=1.030 / pH: x  Gluc: x / Ketone: Trace mg/dL  / Bili: Moderate / Urobili: 4.0 E.U./dL   Blood: x / Protein: 30 mg/dL / Nitrite: NEGATIVE   Leuk Esterase: NEGATIVE / RBC: < 5 /HPF / WBC < 5 /HPF   Sq Epi: x / Non Sq Epi: Rare /HPF / Bacteria: Present /HPF      CAPILLARY BLOOD GLUCOSE  214 (01 Aug 2017 02:07)          RADIOLOGY & ADDITIONAL TESTS: Reviewed.    ASSESSMENT:    PLAN:

## 2017-08-02 NOTE — PROGRESS NOTE ADULT - PROBLEM SELECTOR PLAN 2
With with temperature of 100.3 along with tachycardia meeting 2/4 criteria.  Source not clear as UA is not impressive for infection and patient not having urinary complaints additionally chest x-ray unrevealing of a lobar consolidation.  -Will obtain set of Blood Cx   -If continues to spike fever will start empiric abx until source can be identified  -Would continue fluid @80cc/hr

## 2017-08-02 NOTE — PROGRESS NOTE ADULT - PROBLEM SELECTOR PLAN 3
Patient with recent diagnosis of cirrhosis. No known history of esophageal varices; no ascites noted on exam.  -US Abdomen ordered to further characterize liver  -Will attempt to reach out to patient's PMD to obtain additional history regarding PMHx. Patient with recent diagnosis of cirrhosis. No known history of esophageal varices; no ascites noted on exam.  -US Abdomen ordered to further characterize liver morphology  -Will attempt to reach out to patient's PMD to obtain additional history regarding PMHx.

## 2017-08-03 DIAGNOSIS — J69.0 PNEUMONITIS DUE TO INHALATION OF FOOD AND VOMIT: ICD-10-CM

## 2017-08-03 DIAGNOSIS — N17.9 ACUTE KIDNEY FAILURE, UNSPECIFIED: ICD-10-CM

## 2017-08-03 DIAGNOSIS — A41.9 SEPSIS, UNSPECIFIED ORGANISM: ICD-10-CM

## 2017-08-03 LAB
ANION GAP SERPL CALC-SCNC: 10 MMOL/L — SIGNIFICANT CHANGE UP (ref 5–17)
ANION GAP SERPL CALC-SCNC: 13 MMOL/L — SIGNIFICANT CHANGE UP (ref 5–17)
APTT BLD: 31 SEC — SIGNIFICANT CHANGE UP (ref 27.5–37.4)
B-OH-BUTYR SERPL-SCNC: 0 MMOL/L — SIGNIFICANT CHANGE UP
BASE EXCESS BLDA CALC-SCNC: 1.9 MMOL/L — SIGNIFICANT CHANGE UP (ref -2–3)
BLD GP AB SCN SERPL QL: NEGATIVE — SIGNIFICANT CHANGE UP
BUN SERPL-MCNC: 10 MG/DL — SIGNIFICANT CHANGE UP (ref 7–23)
BUN SERPL-MCNC: 11 MG/DL — SIGNIFICANT CHANGE UP (ref 7–23)
CALCIUM SERPL-MCNC: 8.1 MG/DL — LOW (ref 8.4–10.5)
CALCIUM SERPL-MCNC: 8.2 MG/DL — LOW (ref 8.4–10.5)
CHLORIDE SERPL-SCNC: 103 MMOL/L — SIGNIFICANT CHANGE UP (ref 96–108)
CHLORIDE SERPL-SCNC: 103 MMOL/L — SIGNIFICANT CHANGE UP (ref 96–108)
CO2 SERPL-SCNC: 22 MMOL/L — SIGNIFICANT CHANGE UP (ref 22–31)
CO2 SERPL-SCNC: 24 MMOL/L — SIGNIFICANT CHANGE UP (ref 22–31)
CREAT SERPL-MCNC: 1.1 MG/DL — SIGNIFICANT CHANGE UP (ref 0.5–1.3)
CREAT SERPL-MCNC: 1.1 MG/DL — SIGNIFICANT CHANGE UP (ref 0.5–1.3)
FOLATE SERPL-MCNC: 15 NG/ML — SIGNIFICANT CHANGE UP (ref 4.8–24.2)
GAS PNL BLDA: SIGNIFICANT CHANGE UP
GLUCOSE SERPL-MCNC: 177 MG/DL — HIGH (ref 70–99)
GLUCOSE SERPL-MCNC: 316 MG/DL — HIGH (ref 70–99)
HCO3 BLDA-SCNC: 27 MMOL/L — SIGNIFICANT CHANGE UP (ref 21–28)
HCT VFR BLD CALC: 27.3 % — LOW (ref 39–50)
HGB BLD-MCNC: 9.3 G/DL — LOW (ref 13–17)
INR BLD: 1.12 — SIGNIFICANT CHANGE UP (ref 0.88–1.16)
LACTATE SERPL-SCNC: 1.3 MMOL/L — SIGNIFICANT CHANGE UP (ref 0.5–2)
LACTATE SERPL-SCNC: 2.6 MMOL/L — HIGH (ref 0.5–2)
MAGNESIUM SERPL-MCNC: 1.8 MG/DL — SIGNIFICANT CHANGE UP (ref 1.6–2.6)
MAGNESIUM SERPL-MCNC: 2.1 MG/DL — SIGNIFICANT CHANGE UP (ref 1.6–2.6)
MCHC RBC-ENTMCNC: 31.2 PG — SIGNIFICANT CHANGE UP (ref 27–34)
MCHC RBC-ENTMCNC: 34.1 G/DL — SIGNIFICANT CHANGE UP (ref 32–36)
MCV RBC AUTO: 91.6 FL — SIGNIFICANT CHANGE UP (ref 80–100)
PCO2 BLDA: 43 MMHG — SIGNIFICANT CHANGE UP (ref 35–48)
PH BLDA: 7.41 — SIGNIFICANT CHANGE UP (ref 7.35–7.45)
PLATELET # BLD AUTO: 180 K/UL — SIGNIFICANT CHANGE UP (ref 150–400)
PO2 BLDA: 157 MMHG — HIGH (ref 83–108)
POTASSIUM SERPL-MCNC: 4.2 MMOL/L — SIGNIFICANT CHANGE UP (ref 3.5–5.3)
POTASSIUM SERPL-MCNC: 4.6 MMOL/L — SIGNIFICANT CHANGE UP (ref 3.5–5.3)
POTASSIUM SERPL-SCNC: 4.2 MMOL/L — SIGNIFICANT CHANGE UP (ref 3.5–5.3)
POTASSIUM SERPL-SCNC: 4.6 MMOL/L — SIGNIFICANT CHANGE UP (ref 3.5–5.3)
PROTHROM AB SERPL-ACNC: 12.5 SEC — SIGNIFICANT CHANGE UP (ref 9.8–12.7)
RBC # BLD: 2.98 M/UL — LOW (ref 4.2–5.8)
RBC # FLD: 14 % — SIGNIFICANT CHANGE UP (ref 10.3–16.9)
RH IG SCN BLD-IMP: POSITIVE — SIGNIFICANT CHANGE UP
SAO2 % BLDA: 99 % — SIGNIFICANT CHANGE UP (ref 95–100)
SODIUM SERPL-SCNC: 135 MMOL/L — SIGNIFICANT CHANGE UP (ref 135–145)
SODIUM SERPL-SCNC: 140 MMOL/L — SIGNIFICANT CHANGE UP (ref 135–145)
T PALLIDUM AB TITR SER: NEGATIVE — SIGNIFICANT CHANGE UP
VIT B12 SERPL-MCNC: 1589 PG/ML — HIGH (ref 243–894)
WBC # BLD: 5.3 K/UL — SIGNIFICANT CHANGE UP (ref 3.8–10.5)
WBC # FLD AUTO: 5.3 K/UL — SIGNIFICANT CHANGE UP (ref 3.8–10.5)

## 2017-08-03 PROCEDURE — 99233 SBSQ HOSP IP/OBS HIGH 50: CPT

## 2017-08-03 PROCEDURE — 71010: CPT | Mod: 26

## 2017-08-03 RX ORDER — ACETAMINOPHEN 500 MG
325 TABLET ORAL ONCE
Qty: 0 | Refills: 0 | Status: DISCONTINUED | OUTPATIENT
Start: 2017-08-03 | End: 2017-08-04

## 2017-08-03 RX ORDER — INSULIN LISPRO 100/ML
VIAL (ML) SUBCUTANEOUS
Qty: 0 | Refills: 0 | Status: DISCONTINUED | OUTPATIENT
Start: 2017-08-03 | End: 2017-08-10

## 2017-08-03 RX ORDER — PIPERACILLIN AND TAZOBACTAM 4; .5 G/20ML; G/20ML
3.38 INJECTION, POWDER, LYOPHILIZED, FOR SOLUTION INTRAVENOUS EVERY 6 HOURS
Qty: 0 | Refills: 0 | Status: DISCONTINUED | OUTPATIENT
Start: 2017-08-03 | End: 2017-08-05

## 2017-08-03 RX ORDER — ACETAMINOPHEN 500 MG
650 TABLET ORAL EVERY 6 HOURS
Qty: 0 | Refills: 0 | Status: DISCONTINUED | OUTPATIENT
Start: 2017-08-03 | End: 2017-08-03

## 2017-08-03 RX ORDER — DEXTROSE 50 % IN WATER 50 %
25 SYRINGE (ML) INTRAVENOUS ONCE
Qty: 0 | Refills: 0 | Status: DISCONTINUED | OUTPATIENT
Start: 2017-08-03 | End: 2017-08-10

## 2017-08-03 RX ORDER — SODIUM CHLORIDE 9 MG/ML
1000 INJECTION, SOLUTION INTRAVENOUS
Qty: 0 | Refills: 0 | Status: DISCONTINUED | OUTPATIENT
Start: 2017-08-03 | End: 2017-08-10

## 2017-08-03 RX ORDER — MAGNESIUM SULFATE 500 MG/ML
1 VIAL (ML) INJECTION ONCE
Qty: 0 | Refills: 0 | Status: COMPLETED | OUTPATIENT
Start: 2017-08-03 | End: 2017-08-03

## 2017-08-03 RX ORDER — INSULIN GLARGINE 100 [IU]/ML
5 INJECTION, SOLUTION SUBCUTANEOUS AT BEDTIME
Qty: 0 | Refills: 0 | Status: DISCONTINUED | OUTPATIENT
Start: 2017-08-03 | End: 2017-08-04

## 2017-08-03 RX ORDER — SODIUM CHLORIDE 9 MG/ML
500 INJECTION INTRAMUSCULAR; INTRAVENOUS; SUBCUTANEOUS ONCE
Qty: 0 | Refills: 0 | Status: COMPLETED | OUTPATIENT
Start: 2017-08-03 | End: 2017-08-03

## 2017-08-03 RX ORDER — INSULIN HUMAN 100 [IU]/ML
10 INJECTION, SOLUTION SUBCUTANEOUS ONCE
Qty: 0 | Refills: 0 | Status: COMPLETED | OUTPATIENT
Start: 2017-08-03 | End: 2017-08-03

## 2017-08-03 RX ORDER — METHADONE HYDROCHLORIDE 40 MG/1
100 TABLET ORAL DAILY
Qty: 0 | Refills: 0 | Status: DISCONTINUED | OUTPATIENT
Start: 2017-08-04 | End: 2017-08-04

## 2017-08-03 RX ORDER — IPRATROPIUM/ALBUTEROL SULFATE 18-103MCG
3 AEROSOL WITH ADAPTER (GRAM) INHALATION EVERY 6 HOURS
Qty: 0 | Refills: 0 | Status: DISCONTINUED | OUTPATIENT
Start: 2017-08-03 | End: 2017-08-10

## 2017-08-03 RX ORDER — ACETAMINOPHEN 500 MG
650 TABLET ORAL EVERY 6 HOURS
Qty: 0 | Refills: 0 | Status: DISCONTINUED | OUTPATIENT
Start: 2017-08-03 | End: 2017-08-04

## 2017-08-03 RX ORDER — INSULIN LISPRO 100/ML
3 VIAL (ML) SUBCUTANEOUS
Qty: 0 | Refills: 0 | Status: DISCONTINUED | OUTPATIENT
Start: 2017-08-03 | End: 2017-08-03

## 2017-08-03 RX ORDER — INSULIN GLARGINE 100 [IU]/ML
10 INJECTION, SOLUTION SUBCUTANEOUS AT BEDTIME
Qty: 0 | Refills: 0 | Status: DISCONTINUED | OUTPATIENT
Start: 2017-08-03 | End: 2017-08-03

## 2017-08-03 RX ORDER — IPRATROPIUM/ALBUTEROL SULFATE 18-103MCG
3 AEROSOL WITH ADAPTER (GRAM) INHALATION EVERY 6 HOURS
Qty: 0 | Refills: 0 | Status: DISCONTINUED | OUTPATIENT
Start: 2017-08-03 | End: 2017-08-03

## 2017-08-03 RX ORDER — GLUCAGON INJECTION, SOLUTION 0.5 MG/.1ML
1 INJECTION, SOLUTION SUBCUTANEOUS ONCE
Qty: 0 | Refills: 0 | Status: DISCONTINUED | OUTPATIENT
Start: 2017-08-03 | End: 2017-08-10

## 2017-08-03 RX ORDER — DEXTROSE 50 % IN WATER 50 %
12.5 SYRINGE (ML) INTRAVENOUS ONCE
Qty: 0 | Refills: 0 | Status: DISCONTINUED | OUTPATIENT
Start: 2017-08-03 | End: 2017-08-10

## 2017-08-03 RX ORDER — DEXTROSE 50 % IN WATER 50 %
1 SYRINGE (ML) INTRAVENOUS ONCE
Qty: 0 | Refills: 0 | Status: DISCONTINUED | OUTPATIENT
Start: 2017-08-03 | End: 2017-08-10

## 2017-08-03 RX ORDER — SODIUM CHLORIDE 9 MG/ML
1000 INJECTION INTRAMUSCULAR; INTRAVENOUS; SUBCUTANEOUS ONCE
Qty: 0 | Refills: 0 | Status: COMPLETED | OUTPATIENT
Start: 2017-08-03 | End: 2017-08-03

## 2017-08-03 RX ADMIN — METHADONE HYDROCHLORIDE 150 MILLIGRAM(S): 40 TABLET ORAL at 11:34

## 2017-08-03 RX ADMIN — Medication 650 MILLIGRAM(S): at 14:15

## 2017-08-03 RX ADMIN — PIPERACILLIN AND TAZOBACTAM 200 GRAM(S): 4; .5 INJECTION, POWDER, LYOPHILIZED, FOR SOLUTION INTRAVENOUS at 14:27

## 2017-08-03 RX ADMIN — Medication 4: at 22:25

## 2017-08-03 RX ADMIN — Medication 100 GRAM(S): at 11:34

## 2017-08-03 RX ADMIN — QUETIAPINE FUMARATE 50 MILLIGRAM(S): 200 TABLET, FILM COATED ORAL at 22:34

## 2017-08-03 RX ADMIN — Medication 100 MILLIGRAM(S): at 06:24

## 2017-08-03 RX ADMIN — PIPERACILLIN AND TAZOBACTAM 200 GRAM(S): 4; .5 INJECTION, POWDER, LYOPHILIZED, FOR SOLUTION INTRAVENOUS at 18:24

## 2017-08-03 RX ADMIN — Medication 1 MILLIGRAM(S): at 11:33

## 2017-08-03 RX ADMIN — Medication 3 MILLILITER(S): at 22:35

## 2017-08-03 RX ADMIN — Medication 12: at 12:44

## 2017-08-03 RX ADMIN — Medication 1 TABLET(S): at 11:34

## 2017-08-03 RX ADMIN — Medication 100 MILLIGRAM(S): at 14:27

## 2017-08-03 RX ADMIN — HEPARIN SODIUM 5000 UNIT(S): 5000 INJECTION INTRAVENOUS; SUBCUTANEOUS at 22:32

## 2017-08-03 RX ADMIN — SODIUM CHLORIDE 6000 MILLILITER(S): 9 INJECTION INTRAMUSCULAR; INTRAVENOUS; SUBCUTANEOUS at 16:46

## 2017-08-03 RX ADMIN — INSULIN HUMAN 10 UNIT(S): 100 INJECTION, SOLUTION SUBCUTANEOUS at 14:00

## 2017-08-03 RX ADMIN — SODIUM CHLORIDE 4000 MILLILITER(S): 9 INJECTION INTRAMUSCULAR; INTRAVENOUS; SUBCUTANEOUS at 14:21

## 2017-08-03 RX ADMIN — HEPARIN SODIUM 5000 UNIT(S): 5000 INJECTION INTRAVENOUS; SUBCUTANEOUS at 06:24

## 2017-08-03 RX ADMIN — HEPARIN SODIUM 5000 UNIT(S): 5000 INJECTION INTRAVENOUS; SUBCUTANEOUS at 14:27

## 2017-08-03 RX ADMIN — Medication 100 MILLIGRAM(S): at 22:35

## 2017-08-03 RX ADMIN — INSULIN GLARGINE 5 UNIT(S): 100 INJECTION, SOLUTION SUBCUTANEOUS at 22:27

## 2017-08-03 NOTE — PROGRESS NOTE ADULT - PROBLEM SELECTOR PLAN 4
Patient with history of DM, on home medications Levemer 5 u qHS and Janumet 1000/50 daily.   -HbA1c 6.7%  -On ISS Patient with history of DM, on home medications Levemer 5 u qHS and Janumet 1000/50 daily.   -HbA1c 6.7%    -On ISS Patient with history of DM, on home medications Levemer 5 u qHS and Janumet 1000/50 daily.   -HbA1c 6.7%  -On ISS with basal (Lantus 10u qHS)/nutritional dose (Lispro 3 u pre meal)  -continue to monitor FS    -On ISS

## 2017-08-03 NOTE — PHYSICAL THERAPY INITIAL EVALUATION ADULT - PLANNED THERAPY INTERVENTIONS, PT EVAL
gait training/bed mobility training/balance training/transfer training/postural re-education/strengthening

## 2017-08-03 NOTE — PROGRESS NOTE ADULT - ASSESSMENT
65 y/o male PMHx of prior heroin abuse (on Methadone), Hepatitis C (dx date UNK, untreated), recently diagnosed liver cirrhosis, CKD (stage 3), mild dementia presenting on 8/1 as a transfer for [facility] ED with history of recent fall and altered mentation, agitation, and paranoia. CT head negative for intracranial bleed. AMS likely 2/2 to toxic metabolic encephalopathy of unclear etiology. Here in [facility] [place] for further management. 65 y/o male PMHx of prior heroin abuse (on Methadone), Hepatitis C (dx date UNK, untreated), recently diagnosed liver cirrhosis, CKD (stage 3), mild dementia presenting on 8/1 as a transfer for [facility] ED with history of recent fall and altered mentation, agitation, and paranoia. CT head negative for intracranial bleed. AMS likely 2/2 to pseudodementia 2/2 to severe depression vs toxic metabolic encephalopathy of unclear etiology. Here in [place] for further management.

## 2017-08-03 NOTE — PHYSICAL THERAPY INITIAL EVALUATION ADULT - IMPAIRMENTS FOUND, PT EVAL
ventilation and respiration/gas exchange/aerobic capacity/endurance/gait, locomotion, and balance/muscle strength/poor safety awareness/posture/arousal, attention, and cognition

## 2017-08-03 NOTE — PHYSICAL THERAPY INITIAL EVALUATION ADULT - ADDITIONAL COMMENTS
Pt not compliant with answering all questions. Did say that he lives alone in first floor apartment and that his son visits very frequently. Uses a rollator occasionally.

## 2017-08-03 NOTE — PROGRESS NOTE BEHAVIORAL HEALTH - SUMMARY
66M prior diagnosis of dementia, PMH CKD, HCV, cirrhosis, BIBEMS after leaving home.  Per son patient has years-long history of cognitive deterioration with recent more progressive decline.  Patient's behavioral disregulation likely in setting of sundowning although presentation complicated by history of benzodiazepine and methadone use, with some element of dehydration/malnutrition.  No evidence so far of benzodiazepine withdrawal.  Given patient's diagnosis of dementia, he is not appropriate for inpatient psychiatric admission at this time.  Repeat head CT unremarkable.  QTc 446.  If patient is consistently agitated and posing danger to himself and others in the morning, can add on standing Seroquel 25 mg PO at 9am.

## 2017-08-03 NOTE — CHART NOTE - NSCHARTNOTEFT_GEN_A_CORE
Admitting Diagnosis:   Patient is a 66y old  Male who presents with a chief complaint of dementia and agitation     PAST MEDICAL & SURGICAL HISTORY:  Methadone use  Cirrhosis of liver  CKD (chronic kidney disease)  Hepatitis C: Unknown context of diagnosis  No significant past surgical history      Current Nutrition Order:  Dash/TLC    PO Intake:   25-50%    GI Issues:   Denies N/V/D/C    Pain:  Denies at present     Skin Integrity:  back skin tear     Labs:   08-03    135  |  103  |  10  ----------------------------<  177<H>  4.6   |  22  |  1.10    Ca    8.1<L>      03 Aug 2017 06:59  Mg     1.8     08-03    TPro  5.9<L>  /  Alb  2.4<L>  /  TBili  1.0  /  DBili  0.5<H>  /  AST  63<H>  /  ALT  52<H>  /  AlkPhos  51  08-02    CAPILLARY BLOOD GLUCOSE  401 (03 Aug 2017 12:40)          Medications:  MEDICATIONS  (STANDING):  thiamine 100 milliGRAM(s) Oral three times a day  folic acid 1 milliGRAM(s) Oral daily  multivitamin 1 Tablet(s) Oral daily  heparin  Injectable 5000 Unit(s) SubCutaneous every 8 hours  QUEtiapine 50 milliGRAM(s) Oral at bedtime  methadone    Tablet 150 milliGRAM(s) Oral daily  insulin lispro (HumaLOG) corrective regimen sliding scale   SubCutaneous Before meals and at bedtime  dextrose 5%. 1000 milliLiter(s) (50 mL/Hr) IV Continuous <Continuous>  dextrose 50% Injectable 12.5 Gram(s) IV Push once  dextrose 50% Injectable 25 Gram(s) IV Push once  dextrose 50% Injectable 25 Gram(s) IV Push once  sodium chloride 0.9% Bolus 1000 milliLiter(s) IV Bolus once  insulin regular  human recombinant. 10 Unit(s) IV Push once    MEDICATIONS  (PRN):  haloperidol     Tablet 2 milliGRAM(s) Oral every 4 hours PRN agitation  haloperidol    Injectable 2 milliGRAM(s) IntraMuscular every 4 hours PRN Agitation  dextrose Gel 1 Dose(s) Oral once PRN Blood Glucose LESS THAN 70 milliGRAM(s)/deciliter  glucagon  Injectable 1 milliGRAM(s) IntraMuscular once PRN Glucose LESS THAN 70 milligrams/deciliter  acetaminophen   Tablet 650 milliGRAM(s) Oral every 6 hours PRN For Temp greater than 38 C (100.4 F)  acetaminophen  Suppository 650 milliGRAM(s) Rectal every 6 hours PRN For Temp greater than 38 C (100.4 F)      Weight:  61.2kg (8/1)    Weight Change:   Pt reports #; current wt 135#. If accurate pt with 45# wt loss over unspecified time frame. Questionable reliability of information 2/2 mental status.     Estimated energy needs:   61.2kg used for EER: 25-30kcal/kg (1530-1836kcal), 1-1.2g/kg (61-73g), 30-35kml/kg (1836-2142ml).     Subjective:   67y/o M admitted s/p fall with AMS. Pt seen in bed attempting to eat oatmeal, but is very lethargic and falling asleep. Per progress note, pt sedated this am. Recommend only feeding when pt is alert and appropriate. Pt reports a poor appetite and intake; consuming 25-50%. Pt reports no GI distress, but has not desire to eat. Reports not having his teeth, but denies mechanical issues. Pt endorses wt loss, with questionable reliability; nonetheless suspect moderate PCM 2/2 poor intake, wt loss, and noticeable loss of lean body mass. Recommend diet liberalization with ONS. Provide assistance with meals and provide general aspiration precautions. Will follow.      Previous Nutrition Diagnosis:  Predicted suboptimal nutrient intake (specify) RT mental status and BW AEB pt being agitated with BMI 19.4    Active [X]  Resolved [   ]    Goal:  Pt to meet at least 75% of EER    Recommendations:  regular diet + ensure enlive TID  Add MVI  Assistance with meals prn  Monitor lytes and replete prn     Education:   meal encouragement; pt not appropriate for in depth education at time of follow up.     Risk Level: High [X] Moderate [   ] Low [   ]

## 2017-08-03 NOTE — PROVIDER CONTACT NOTE (CHANGE IN STATUS NOTIFICATION) - BACKGROUND
Patient admitted fro medical evaluation, Hx. of Methadone use, Liver cirrhosis, CKD,hep. C, behavior disturbance.

## 2017-08-03 NOTE — PROGRESS NOTE BEHAVIORAL HEALTH - NSBHFUPINTERVALHXFT_PSY_A_CORE
Spoke to patient's overnight and daytime RN.  Patient slept well overnight, cooperated with abdominal ultrasound.  Perseverative regarding wanting full dose of methadone.  Per primary team patient was sedated this morning; received methadone 50 mg and Seroquel 50 mg HS.    On interview at bedside, patient asleep but woke easily to name called.  Slightly irritable but cooperated with interview, then abruptly said he needed to use bathroom.  Explained to patient that he would either need assistance walking to the bathroom or to use bedside urinal- patient was irritated, attempting to get out of bed on his own but then cooperated with aide.

## 2017-08-03 NOTE — PROGRESS NOTE ADULT - PROBLEM SELECTOR PLAN 1
Etiology of encephalopathy multifactorial.  Patient having intermittent  fevers along with recently diagnosed cirrhosis  with underlying dementia unclear if diagnosis confirmed and on high doses of methadone.  Currently mental status improving  -Will treat for possible aspiration PNA  -Keep Euvolemic   -Replete electrolytes as needed  -Avoid sedative medications unless necessary  -B12/folate/RPR/TSH

## 2017-08-03 NOTE — PROGRESS NOTE ADULT - PROBLEM SELECTOR PLAN 5
Etiology of cirrhosis appears to be from hepatitis C infection.    Obtain collateral regarding extent of disease (hx of esophageal varices?)  -No ascitics present on my examination  -Keep patient euvolemic  -Obtain collateral  -Abdominal US shows possible hepatitis   -Order Hep panel

## 2017-08-03 NOTE — PROGRESS NOTE ADULT - PROBLEM SELECTOR PLAN 7
Patient with reported history of dementia per patient's son. Son notes decline over the last couple of weeks. Will obtain collateral from patient's PMD as to whether patient has any underlying psychiatric issues, dementia. 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 Son notes decline over the last couple of weeks.   -Managed by PCP with Ativan 2 mg TID PRN. Will hold Ativan, patient showing signs of withdrawal. Will continue to monitor CIWA.

## 2017-08-03 NOTE — PROGRESS NOTE ADULT - ASSESSMENT
67 y/o male PMHx of prior heroin abuse (on Methadone), Hepatitis C (dx date UNK, untreated), recently diagnosed liver cirrhosis, CKD (stage 3), mild dementia presents for AMS

## 2017-08-03 NOTE — PROGRESS NOTE ADULT - PROBLEM SELECTOR PLAN 2
UA and blood cultures are unremarkable for infection.  US of abdomen unrevealing for ascities  -Concerned for possible aspiration as patients swallowing mechanism may be impaired due to AMS  -Zosyn   xray in AM  Speech and Swallow

## 2017-08-03 NOTE — PHYSICAL THERAPY INITIAL EVALUATION ADULT - CRITERIA FOR SKILLED THERAPEUTIC INTERVENTIONS
impairments found/anticipated discharge recommendation/rehab potential/functional limitations in following categories

## 2017-08-03 NOTE — PROGRESS NOTE ADULT - PROBLEM SELECTOR PLAN 3
Patient with recent diagnosis of cirrhosis. No known history of esophageal varices; no ascites noted on exam.  -US Abdomen ordered to further characterize liver morphology  -Will attempt to reach out to patient's PMD to obtain additional history regarding PMHx. Patient with recent diagnosis of cirrhosis. No known history of esophageal varices; no ascites noted on exam.  -US Abdomen performed revealed mildly nodular morphology c/w cirrhosis, slightly enlarged (16 cm), biliary sludge (no cholecystitis)

## 2017-08-03 NOTE — PROGRESS NOTE ADULT - PROBLEM SELECTOR PLAN 10
F: No fluids indicated at this time; patient eating/drinking well in hospital  E: Replete electrolytes as needed; K>4; Mg>2  N: DASH diet    DVT ppx: Heparin SubQ 5000 u q8h (moderate risk)  CODE: FULL  Dispo: [facility] F: No fluids indicated at this time; patient eating/drinking well in hospital  E: Replete electrolytes as needed; K>4; Mg>2  N: regular diet + ensure enlive TID, Add MVI, Assistance with meals prn (per nutrition recs)    DVT ppx: Heparin SubQ 5000 u q8h (moderate risk)  CODE: FULL  Dispo: [facility]

## 2017-08-03 NOTE — PROGRESS NOTE ADULT - PROBLEM SELECTOR PLAN 5
Patient with prior history of heroin abuse; on Methadone 220 mg since age 20 (verified with methadone clinic/patient's son). Son notes that patient usually somnolent for the majority of the day after dosing. Will continue with methadone however in light of patient's liver pathology and the drug being hepatically metabolized, will reduce dose to 150 mg daily. Continue to monitor patient for withdrawal symptoms at reduced dose.  -EKG ordered to monitor for QT prolongation, f/u results. Patient with prior history of heroin abuse; on Methadone 220 mg since age 20 (verified with methadone clinic/patient's son). Son notes that patient usually somnolent for the majority of the day after dosing. Will continue with methadone however in light of patient's liver pathology and the drug being hepatically metabolized, will reduce dose to 150 mg daily. Patient somnolent this morning; if 150 mg dose felt to be too high, will consider reducing to 125 mg. Continue to monitor patient for withdrawal symptoms at reduced dose.  -EKG - .

## 2017-08-03 NOTE — PHYSICAL THERAPY INITIAL EVALUATION ADULT - GAIT DEVIATIONS NOTED, PT EVAL
decreased weight-shifting ability/decreased step length/decreased michael/inc leaning to L side/decreased stride length

## 2017-08-03 NOTE — PROGRESS NOTE BEHAVIORAL HEALTH - NSBHCONSULTMEDS_PSY_A_CORE FT
1. Continue methadone 150 mg PO daily  2. Continue Seroquel 50 mg PO HS  3. If patient is consistently agitated in the morning prior to methadone dose, can give Seroquel 25 mg PO at 9am or administer methadone earlier  4. Continue haldol 2 mg PO or IM q4h prn for agitation; offer PO first and if patient is unwilling or unable to take, give IM

## 2017-08-03 NOTE — PROGRESS NOTE ADULT - PROBLEM SELECTOR PLAN 9
-Patient's son notes patient has not been eating much in last 2 weeks as has been sleeping through much of the day; given he unlikely is meeting his daily caloric needs, nutrition consult called. f/u recs  -DASH diet; replete electrolytes as needed; K>4, Mg>2 -Patient's son notes patient has not been eating much in last 2 weeks as has been sleeping through much of the day; given he unlikely is meeting his daily caloric needs, nutrition consult called, moderate protein calorie malnutrition. Rec: regular diet + ensure enlive TID, Add MVI, Assistance with meals prn      -DASH diet; replete electrolytes as needed; K>4, Mg>2

## 2017-08-03 NOTE — SWALLOW BEDSIDE ASSESSMENT ADULT - COMMENTS
67 y/o male PMHx of prior heroin abuse (on Methadone), Hepatitis C (dx date UNK, untreated), recently diagnosed liver cirrhosis, CKD (stage 3), & mild dementia presented w AMS

## 2017-08-03 NOTE — SWALLOW BEDSIDE ASSESSMENT ADULT - SLP GENERAL OBSERVATIONS
Pt was received lethargic in bed w son at bedside, who is a nurse at [employer]. Per son, pt speaks intelligibly w adequate volume. Currently, pt w slurred speech, hypophonic voice and incoherent language content. Pt became increasingly sleepy as this evaluation progressed.

## 2017-08-03 NOTE — PROGRESS NOTE ADULT - SUBJECTIVE AND OBJECTIVE BOX
Patient is a 66y old  Male who presents with a chief complaint of     INTERVAL HPI/ OVERNIGHT EVENTS: No acute overnight events     Review of Systems: 12 point review of systems otherwise negative  ( - )fevers/chills  ( - ) dyspnea  ( - ) cough  ( - ) chest pain  ( - ) palpatations  ( - ) dizziness/lightheadedness  ( - ) nausea/vomiting  ( - ) abd pain  ( - ) diarrhea  ( - ) melena  ( - ) hematochezia  ( - ) dysuria  ( - ) hematuria  ( - ) leg swelling  ( -) calf tenderness  ( - ) motor weakness  ( - ) extremity numbness  ( - ) back pain  ( + ) tolerating POs  ( + ) BM    MEDICATIONS  (STANDING):  thiamine 100 milliGRAM(s) Oral three times a day  folic acid 1 milliGRAM(s) Oral daily  multivitamin 1 Tablet(s) Oral daily  heparin  Injectable 5000 Unit(s) SubCutaneous every 8 hours  QUEtiapine 50 milliGRAM(s) Oral at bedtime  insulin lispro (HumaLOG) corrective regimen sliding scale   SubCutaneous Before meals and at bedtime  dextrose 5%. 1000 milliLiter(s) (50 mL/Hr) IV Continuous <Continuous>  dextrose 50% Injectable 12.5 Gram(s) IV Push once  dextrose 50% Injectable 25 Gram(s) IV Push once  dextrose 50% Injectable 25 Gram(s) IV Push once  insulin regular  human recombinant. 10 Unit(s) IV Push once  piperacillin/tazobactam IVPB. 3.375 Gram(s) IV Intermittent every 6 hours  sodium chloride 0.9% Bolus 500 milliLiter(s) IV Bolus once  acetaminophen  Suppository 325 milliGRAM(s) Rectal once  insulin glargine Injectable (LANTUS) 5 Unit(s) SubCutaneous at bedtime    MEDICATIONS  (PRN):  haloperidol     Tablet 2 milliGRAM(s) Oral every 4 hours PRN agitation  haloperidol    Injectable 2 milliGRAM(s) IntraMuscular every 4 hours PRN Agitation  dextrose Gel 1 Dose(s) Oral once PRN Blood Glucose LESS THAN 70 milliGRAM(s)/deciliter  glucagon  Injectable 1 milliGRAM(s) IntraMuscular once PRN Glucose LESS THAN 70 milligrams/deciliter  acetaminophen  Suppository 650 milliGRAM(s) Rectal every 6 hours PRN For Temp greater than 38 C (100.4 F)  ALBUTerol/ipratropium for Nebulization 3 milliLiter(s) Nebulizer every 6 hours PRN Shortness of Breath and/or Wheezing      Allergies    No Known Allergies    Intolerances          Vital Signs Last 24 Hrs  T(C): 38.6 (03 Aug 2017 14:24), Max: 39.5 (03 Aug 2017 13:31)  T(F): 101.5 (03 Aug 2017 14:24), Max: 103.1 (03 Aug 2017 13:31)  HR: 103 (03 Aug 2017 14:24) (82 - 116)  BP: 113/65 (03 Aug 2017 14:24) (113/65 - 150/77)  BP(mean): --  RR: 24 (03 Aug 2017 14:24) (15 - 30)  SpO2: 97% (03 Aug 2017 14:24) (90% - 97%)  CAPILLARY BLOOD GLUCOSE  264 (03 Aug 2017 14:24)  399 (03 Aug 2017 14:02)  401 (03 Aug 2017 12:40)            Physical Exam:    Daily     General:  Chronically ill appearing, NAD, cachetic  HEENT:  Nonicteric, PERRLA  CV:  RRR, no murmur, no JVD  Lungs:  CTA B/L, no wheezes, rales, rhonchi  Abdomen:  Soft, non-tender, no distended, positive BS, no hepatosplenomegaly  Extremities:  2+ pulses, no c/c, no edema  Skin:  Warm and dry, no rashes  :  No day  Neuro:  AAOx3, non-focal, CN II-XII grossly intact  No Restraints    LABS:                        9.3    5.3   )-----------( 180      ( 03 Aug 2017 14:27 )             27.3     08-03    140  |  103  |  11  ----------------------------<  316<H>  4.2   |  24  |  1.10    Ca    8.2<L>      03 Aug 2017 14:27  Mg     2.1     08-03    TPro  5.9<L>  /  Alb  2.4<L>  /  TBili  1.0  /  DBili  0.5<H>  /  AST  63<H>  /  ALT  52<H>  /  AlkPhos  51  08-02    PT/INR - ( 03 Aug 2017 14:27 )   PT: 12.5 sec;   INR: 1.12          PTT - ( 03 Aug 2017 14:27 )  PTT:31.0 sec        RADIOLOGY & ADDITIONAL TESTS:

## 2017-08-03 NOTE — PROGRESS NOTE ADULT - PROBLEM SELECTOR PLAN 1
Patient presented to [facility] with AMS s/p fall, agitation, paranoia. History of drug abuse (on methadone). Per patient's son, patient has been reported as a missing person for the last week. His behavior has become more erratic over the last two weeks and has been reported to have gone to multiple EDs over the past week seeking methadone. AMS likely 2/2 to toxic metabolic encephalopathy of unclear source vs infectious etiology.  -Patient aggressive overnight, fell and hit his head. Repeat CT head w/o contrast done, no ICH noted.   -Psych on board; patient's behavior unlikely 2/2 to underlying psychiatric disorder. Per PCP (Dr Orozco), patient history of severe depression after wife's death 2 years ago (suicide attempt last year). Managed at home with Ativan 2 mg TID PRN. AMS likely pseudodementia 2/2 to depression. Recommend Seroquel 50 mg qHS for sundowning, Haldol PO 2 mg q4h PRN (first, if agitated overnight) or Haldol IM 2 mg q4h PRN (2nd option for agitation).  -Patient tremulous on exam; patient with history of Benzo abuse at home (Ativan 2 mg TID), likely 2/2 to withdrawal; continue to monitor patient's CIWA q4h and give Ativan 1 mg PRN. Patient presented to [facility] with AMS s/p fall, agitation, paranoia. History of drug abuse (on methadone). Per patient's son, patient has been reported as a missing person for the last week. His behavior has become more erratic over the last two weeks and has been reported to have gone to multiple EDs over the past week seeking methadone. 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 Toxic metabolic encephalopathy vs infectious process lower on ddx.  -Patient not aggressive overnight. Patient received additional 50 mg Methadone dose (in addition to morning 100 mg dose) and PM dose of Seroquel 50 mg per psych recommendations. Patient somnolent this AM, patient being continued on methadone at a reduced dose (150 mg). Will consider reducing dose to 125 mg daily if patient is overly sedated from 150 dose.  -Psych on board; patient's behavior unlikely 2/2 to underlying psychiatric disorder. Per PCP (Dr Orozco), patient history of severe depression after wife's death 2 years ago (suicide attempt last year). Managed at home with Ativan 2 mg TID PRN. AMS likely pseudodementia 2/2 to depression. Recommend Seroquel 50 mg qHS for sundowning, Haldol PO 2 mg q4h PRN (first, if agitated overnight) or Haldol IM 2 mg q4h PRN (2nd option for agitation).  -Patient tremulous on exam; patient with history of Benzo abuse at home (Ativan 2 mg TID), likely 2/2 to withdrawal; continue to monitor patient's CIWA q4h and give Ativan 1 mg PRN.

## 2017-08-03 NOTE — PHYSICAL THERAPY INITIAL EVALUATION ADULT - LEVEL OF CONSCIOUSNESS, REHAB EVAL
agitated/Pt not combative at this time however medical chart and RN reports state that pt has been combative this hospital stay/alert

## 2017-08-03 NOTE — PHYSICAL THERAPY INITIAL EVALUATION ADULT - PERTINENT HX OF CURRENT PROBLEM, REHAB EVAL
67 y/o male PMHx of prior heroin abuse (on Methadone), Hepatitis C (dx date UNK, untreated), recently diagnosed liver cirrhosis, CKD (stage 3), mild dementia presenting on 8/1 as a transfer for [facility] ED with history of recent fall and altered mentation, agitation, and paranoia. CT head negative for intracranial bleed. AMS likely 2/2 to toxic metabolic encephalopathy of unclear etiology.

## 2017-08-03 NOTE — SWALLOW BEDSIDE ASSESSMENT ADULT - PHARYNGEAL PHASE
Multiple attempts to trigger a swallow was palpated before a true swallow was triggered. Given this in setting of increasing difficulty sustaining alertness, no further PO trials were administered.

## 2017-08-03 NOTE — SWALLOW BEDSIDE ASSESSMENT ADULT - SWALLOW EVAL: DIAGNOSIS
Pt p/w oropharyngeal dysphagia c/b reduced sensation and difficulty triggering a swallow. Although no overt s/s of airway protection deficits were observed during this clinical exam, given pt's MS, pt is at HIGH risk of prandial aspiration.

## 2017-08-03 NOTE — CHART NOTE - NSCHARTNOTEFT_GEN_A_CORE
Upon Nutritional Assessment by the Registered Dietitian your patient was determined to meet criteria / has evidence of the following diagnosis/diagnoses:          [ ]  Mild Protein Calorie Malnutrition        [X]  Moderate Protein Calorie Malnutrition        [ ] Severe Protein Calorie Malnutrition        [ ] Unspecified Protein Calorie Malnutrition        [ ] Underweight / BMI <19        [ ] Morbid Obesity / BMI > 40      Findings as based on:  •  Comprehensive nutrition assessment and consultation  •  Calorie counts (nutrient intake analysis)  •  Food acceptance and intake status from observations by staff  •  Follow up  •  Patient education  •  Intervention secondary to interdisciplinary rounds  •   concerns      Treatment:    The following diet has been recommended:      PROVIDER Section:     By signing this assessment you are acknowledging and agree with the diagnosis/diagnoses assigned by the Registered Dietitian    Comments:

## 2017-08-03 NOTE — PROGRESS NOTE ADULT - PROBLEM SELECTOR PLAN 8
Patient presenting with Creatinine 1.25, GFR 44 on admission (CKD stage 3); unknown baseline. Will attempt to speak with patient's PMD and get additional history/records for patient.  -Creatinine 1.10 today; continue to trend.

## 2017-08-03 NOTE — PROGRESS NOTE ADULT - PROBLEM SELECTOR PLAN 2
Patient meeting 2/4 SIRS criteria (temp 100.3, tachycardia) overnight.  -Tmax 100.3, unclear etiology. Examination unremarkable for w/r/r; warm to palpation  -UA negative, unlikely urinary source.   -No WBC (6.3 - 8/2), unlikely infectious in etiology  -Blood cultures obtained and sent today, f/u results  -CXR revealing confluent opacity noted over the left lower lung zone which may represent developing left lower lobe pneumonia and/or atelectasis. Patient meeting 2/4 SIRS criteria (temp 100.3, tachycardia) overnight on 8/1, afebrile today.   -UA negative, unlikely urinary source.   -No WBC (6.3 - 8/2), unlikely infectious in etiology  -Blood cultures obtained and sent 8/2, no growth to date  -CXR revealing confluent opacity noted over the left lower lung zone which may represent developing left lower lobe pneumonia and/or atelectasis.

## 2017-08-03 NOTE — PROGRESS NOTE ADULT - SUBJECTIVE AND OBJECTIVE BOX
OVERNIGHT EVENTS: No overnight events.    SUBJECTIVE / INTERVAL HPI: Patient seen and examined at bedside. Patient somnolent during encounter, likely secondary to seroquel and PM Methadone given last evening. Will receive 150 mg Methadone at 12 pm today. Unable to obtain ROS from patient.    VITAL SIGNS:  Vital Signs Last 24 Hrs  T(C): 36.9 (03 Aug 2017 06:25), Max: 37.3 (02 Aug 2017 09:36)  T(F): 98.5 (03 Aug 2017 06:25), Max: 99.1 (02 Aug 2017 09:36)  HR: 86 (03 Aug 2017 06:25) (73 - 106)  BP: 136/78 (03 Aug 2017 06:25) (124/73 - 150/74)  BP(mean): --  RR: 17 (03 Aug 2017 06:25) (15 - 17)  SpO2: 95% (03 Aug 2017 06:25) (92% - 95%)    PHYSICAL EXAM:    General: WDWN  HEENT: NC/AT; PERRL, anicteric sclera; MMM  Neck: supple  Cardiovascular: +S1/S2; RRR  Respiratory: CTA B/L; no W/R/R  Gastrointestinal: soft, NT/ND; +BSx4  Extremities: WWP; no edema, clubbing or cyanosis  Vascular: 2+ radial, DP/PT pulses B/L  Neurological: patient opens eyes when you address him however incoherent; unable to assess alertness and orientation    MEDICATIONS:  MEDICATIONS  (STANDING):  thiamine 100 milliGRAM(s) Oral three times a day  folic acid 1 milliGRAM(s) Oral daily  multivitamin 1 Tablet(s) Oral daily  heparin  Injectable 5000 Unit(s) SubCutaneous every 8 hours  QUEtiapine 50 milliGRAM(s) Oral at bedtime  methadone    Tablet 150 milliGRAM(s) Oral daily  magnesium sulfate  IVPB 1 Gram(s) IV Intermittent once  insulin lispro (HumaLOG) corrective regimen sliding scale   SubCutaneous Before meals and at bedtime  dextrose 5%. 1000 milliLiter(s) (50 mL/Hr) IV Continuous <Continuous>  dextrose 50% Injectable 12.5 Gram(s) IV Push once  dextrose 50% Injectable 25 Gram(s) IV Push once  dextrose 50% Injectable 25 Gram(s) IV Push once    MEDICATIONS  (PRN):  haloperidol     Tablet 2 milliGRAM(s) Oral every 4 hours PRN agitation  haloperidol    Injectable 2 milliGRAM(s) IntraMuscular every 4 hours PRN Agitation  dextrose Gel 1 Dose(s) Oral once PRN Blood Glucose LESS THAN 70 milliGRAM(s)/deciliter  glucagon  Injectable 1 milliGRAM(s) IntraMuscular once PRN Glucose LESS THAN 70 milligrams/deciliter      ALLERGIES:  Allergies    No Known Allergies    Intolerances        LABS:                        9.1    6.4   )-----------( 203      ( 02 Aug 2017 15:46 )             27.5     08-03    135  |  103  |  10  ----------------------------<  177<H>  4.6   |  22  |  1.10    Ca    8.1<L>      03 Aug 2017 06:59  Mg     1.8     08-03    TPro  5.9<L>  /  Alb  2.4<L>  /  TBili  1.0  /  DBili  0.5<H>  /  AST  63<H>  /  ALT  52<H>  /  AlkPhos  51  08-02        CAPILLARY BLOOD GLUCOSE          RADIOLOGY & ADDITIONAL TESTS: Reviewed.    ASSESSMENT:    PLAN: OVERNIGHT EVENTS: No overnight events.    SUBJECTIVE / INTERVAL HPI: Patient seen and examined at bedside. Patient somnolent during encounter, likely secondary to seroquel and PM Methadone given last evening. Will receive 150 mg Methadone at 12 pm today. Unable to obtain ROS from patient.    VITAL SIGNS:  Vital Signs Last 24 Hrs  T(C): 36.9 (03 Aug 2017 06:25), Max: 37.3 (02 Aug 2017 09:36)  T(F): 98.5 (03 Aug 2017 06:25), Max: 99.1 (02 Aug 2017 09:36)  HR: 86 (03 Aug 2017 06:25) (73 - 106)  BP: 136/78 (03 Aug 2017 06:25) (124/73 - 150/74)  BP(mean): --  RR: 17 (03 Aug 2017 06:25) (15 - 17)  SpO2: 95% (03 Aug 2017 06:25) (92% - 95%)    PHYSICAL EXAM:    General: thin gentleman, somnolent; arousable when addressing patient; NAD, disheveled, calm  HEENT: NC/AT; PERRL, anicteric sclera; MMM, no teeth   Neck: supple  Cardiovascular: +S1/S2; RRR, no m/r/g appreciated on auscultation  Respiratory: CTA B/L; no W/R/R  Gastrointestinal: soft, NT/ND; +BSx4, no notable ascites  Extremities: no clubbing or cyanosis; mild edema of LE b/l  Vascular: 2+ radial, DP/PT pulses B/L  Neurological: patient opens eyes when you address him however incoherent; unable to assess alertness and orientation    MEDICATIONS:  MEDICATIONS  (STANDING):  thiamine 100 milliGRAM(s) Oral three times a day  folic acid 1 milliGRAM(s) Oral daily  multivitamin 1 Tablet(s) Oral daily  heparin  Injectable 5000 Unit(s) SubCutaneous every 8 hours  QUEtiapine 50 milliGRAM(s) Oral at bedtime  methadone    Tablet 150 milliGRAM(s) Oral daily  magnesium sulfate  IVPB 1 Gram(s) IV Intermittent once  insulin lispro (HumaLOG) corrective regimen sliding scale   SubCutaneous Before meals and at bedtime  dextrose 5%. 1000 milliLiter(s) (50 mL/Hr) IV Continuous <Continuous>  dextrose 50% Injectable 12.5 Gram(s) IV Push once  dextrose 50% Injectable 25 Gram(s) IV Push once  dextrose 50% Injectable 25 Gram(s) IV Push once    MEDICATIONS  (PRN):  haloperidol     Tablet 2 milliGRAM(s) Oral every 4 hours PRN agitation  haloperidol    Injectable 2 milliGRAM(s) IntraMuscular every 4 hours PRN Agitation  dextrose Gel 1 Dose(s) Oral once PRN Blood Glucose LESS THAN 70 milliGRAM(s)/deciliter  glucagon  Injectable 1 milliGRAM(s) IntraMuscular once PRN Glucose LESS THAN 70 milligrams/deciliter      ALLERGIES:  Allergies    No Known Allergies    Intolerances        LABS:                        9.1    6.4   )-----------( 203      ( 02 Aug 2017 15:46 )             27.5     08-03    135  |  103  |  10  ----------------------------<  177<H>  4.6   |  22  |  1.10    Ca    8.1<L>      03 Aug 2017 06:59  Mg     1.8     08-03    TPro  5.9<L>  /  Alb  2.4<L>  /  TBili  1.0  /  DBili  0.5<H>  /  AST  63<H>  /  ALT  52<H>  /  AlkPhos  51  08-02        CAPILLARY BLOOD GLUCOSE          RADIOLOGY & ADDITIONAL TESTS: Reviewed.    ASSESSMENT:    PLAN:

## 2017-08-03 NOTE — PROVIDER CONTACT NOTE (CHANGE IN STATUS NOTIFICATION) - ASSESSMENT
Patient tachypneic, sepsis protocol preformed, Zosyn3.375 mg  given, 1.5L NS bolus, chest x-ray preformed

## 2017-08-03 NOTE — SWALLOW BEDSIDE ASSESSMENT ADULT - ADDITIONAL RECOMMENDATIONS
1) oral care  2) Per RN, pt was more alert before his methadone dose in the afternoon. He was observed drinking water without overt s/s of aspiration. Consider a Nursing Bedside Dysphagia Screen if pt is more alert in the evening or early in the morning tomorrow.

## 2017-08-03 NOTE — CHART NOTE - NSCHARTNOTEFT_GEN_A_CORE
PGY-3 Event Note    RN alerted MDs that pt was febrile to 103F rectally and . MDs went to bedside to examine patient. Pt was lethargic, but easily aroused by voice and communicative.     Physical exam:  Vitals T 103.1F,  (from 80s), /72, RR 30, Sat 90% on RA --> 95% on 2LNC  General: disheveled, mildly diaphoretic, alert, mild respiratory distress   Neck: supple, no JVD  Lungs: Coarse crackles R> L. no wheezes, rhonchi. Not using accessory muscles to breathe  GI: BS +, soft, Non-tender to palpation   Extremities: warm, well perfused, no edema  Neuro: AAox0 (baseline). no focal deficits     Obtained STAT CXR, labs (abg, lactate, BMP, CBC, Mag, T&S, beta-hydroxybutyrate, PT/PTT), blood cx and UA. Given 1L NS bolus, tylenol 650mg, 10 units regular insulin IVP x1, zosyn 3.375mg q6h.     A/P: 65yo M w/PMhx of heroin abuse (on 220mg methadone daily from methadone clinic), Hep C Cirrhosis (untreated) was BIBA for agitation, was being monitored and treated for agitation/aggression.      PGY-3 Event Note    RN alerted MDs that pt was febrile to 103F rectally and . MDs went to bedside to examine patient. Pt was lethargic, but easily aroused by voice and communicative.     Physical exam:  Vitals T 103.1F,  (from 80s), /72, RR 30, Sat 90% on RA --> 95% on 2LNC  General: disheveled, mildly diaphoretic, alert, mild respiratory distress   Neck: supple, no JVD  Lungs: Coarse crackles R> L. no wheezes, rhonchi. Not using accessory muscles to breathe  GI: BS +, soft, Non-tender to palpation   Extremities: warm, well perfused, no edema  Neuro: AAox0 (baseline). no focal deficits     Obtained STAT CXR, labs (abg, lactate, BMP, CBC, Mag, T&S, beta-hydroxybutyrate, PT/PTT), blood cx and UA. Given 1L NS bolus, tylenol 650mg, 10 units regular insulin IVP x1, zosyn 3.375mg q6h.     A/P: 67yo M w/PMhx of depression, past suicide attempt, heroin abuse (on 220mg methadone daily from methadone clinic), Hep C Cirrhosis (untreated) was BIBA for agitation, was being monitored and treated for agitation/aggression now with severe sepsis 2/2 aspiration PNA and hyperglycemia.    Plan  - will start zosyn 3.375 q6h for aspiration PNA  - will f/u stat labs   - NPO, pending speech and swallow eval  - repeat lactate, trend to nml  - repeat FS s/p 10 units IV regular insulin is 264  - will start 10 units lantus qHs and 3 units TID w/ meals for hyperglycemia   - will repeat vitals s/p intervention and if still meeting criteria for sepsis, will give additional 500cc boluses as needed until fully resuscitated with 1800cc fluid (30cc/kg)   - will monitor closely  - Attending on record notified of change in status PGY-3 Event Note    RN alerted MDs that pt was febrile to 103F rectally and . MDs went to bedside to examine patient. Pt was lethargic, but easily aroused by voice and communicative.     Physical exam:  Vitals T 103.1F,  (from 80s), /72, RR 30, Sat 90% on RA --> 95% on 2LNC  General: disheveled, mildly diaphoretic, alert, mild respiratory distress   Neck: supple, no JVD  Lungs: Coarse crackles R> L. no wheezes, rhonchi. Not using accessory muscles to breathe  GI: BS +, soft, Non-tender to palpation   Extremities: warm, well perfused, no edema  Neuro: AAox0 (baseline). no focal deficits     Obtained STAT CXR, labs (abg, lactate, BMP, CBC, Mag, T&S, beta-hydroxybutyrate, PT/PTT), blood cx and UA. Given 1L NS bolus, tylenol 650mg, 10 units regular insulin IVP x1, zosyn 3.375mg q6h.     A/P: 65yo M w/PMhx of depression, past suicide attempt, heroin abuse (on 220mg methadone daily from methadone clinic), Hep C Cirrhosis (untreated) was BIBA for agitation, was being monitored and treated for agitation/aggression now with severe sepsis 2/2 aspiration PNA and hyperglycemia.    Plan  - CXR showing RLL infiltrate   - will start zosyn 3.375 q6h for aspiration PNA  - will f/u stat labs   - NPO, pending speech and swallow eval  - repeat lactate, trend to nml  - repeat FS s/p 10 units IV regular insulin is 264  - will start 10 units lantus qHs and 3 units TID w/ meals for hyperglycemia   - will repeat vitals s/p intervention and if still meeting criteria for sepsis, will give additional 500cc boluses as needed until fully resuscitated with 1800cc fluid (30cc/kg)   - will monitor closely  - Attending on record notified of change in status PGY-3 Event Note    RN alerted MDs that pt was febrile to 103F rectally and . MDs went to bedside to examine patient. Pt was lethargic, but easily aroused by voice and communicative.     Physical exam:  Vitals T 103.1F,  (from 80s), /72, RR 30, Sat 90% on RA --> 95% on 2LNC  General: disheveled, mildly diaphoretic, alert, mild respiratory distress   Neck: supple, no JVD  Lungs: Coarse crackles R> L. no wheezes, rhonchi. Not using accessory muscles to breathe  GI: BS +, soft, Non-tender to palpation   Extremities: warm, well perfused, no edema  Neuro: AAox0 (baseline). no focal deficits     Obtained STAT CXR, labs (abg, lactate, BMP, CBC, Mag, T&S, beta-hydroxybutyrate, PT/PTT), blood cx and UA. Given 1L NS bolus, tylenol 650mg, 10 units regular insulin IVP x1, zosyn 3.375mg q6h.     A/P: 67yo M w/PMhx of depression, past suicide attempt, heroin abuse (on 220mg methadone daily from methadone clinic), Hep C Cirrhosis (untreated) was BIBA for agitation, was being monitored and treated for agitation/aggression now with severe sepsis 2/2 aspiration PNA and hyperglycemia.    Plan  - CXR showing RLL infiltrate   - will start zosyn 3.375 q6h for aspiration PNA  - will f/u stat labs   - NPO, pending speech and swallow eval  - repeat lactate, trend to nml  - repeat FS s/p 10 units IV regular insulin is 264  - will start 5 units   - will repeat vitals s/p intervention and if still meeting criteria for sepsis, will give additional 500cc boluses as needed until fully resuscitated with 1800cc fluid (30cc/kg)   - will monitor closely  - Attending on record notified of change in status

## 2017-08-04 DIAGNOSIS — R41.0 DISORIENTATION, UNSPECIFIED: ICD-10-CM

## 2017-08-04 DIAGNOSIS — A41.9 SEPSIS, UNSPECIFIED ORGANISM: ICD-10-CM

## 2017-08-04 DIAGNOSIS — E11.8 TYPE 2 DIABETES MELLITUS WITH UNSPECIFIED COMPLICATIONS: ICD-10-CM

## 2017-08-04 LAB
ANION GAP SERPL CALC-SCNC: 12 MMOL/L — SIGNIFICANT CHANGE UP (ref 5–17)
BUN SERPL-MCNC: 9 MG/DL — SIGNIFICANT CHANGE UP (ref 7–23)
CALCIUM SERPL-MCNC: 8.3 MG/DL — LOW (ref 8.4–10.5)
CHLORIDE SERPL-SCNC: 101 MMOL/L — SIGNIFICANT CHANGE UP (ref 96–108)
CO2 SERPL-SCNC: 25 MMOL/L — SIGNIFICANT CHANGE UP (ref 22–31)
CREAT SERPL-MCNC: 1.1 MG/DL — SIGNIFICANT CHANGE UP (ref 0.5–1.3)
GLUCOSE SERPL-MCNC: 160 MG/DL — HIGH (ref 70–99)
HCT VFR BLD CALC: 27.4 % — LOW (ref 39–50)
HGB BLD-MCNC: 9.6 G/DL — LOW (ref 13–17)
MAGNESIUM SERPL-MCNC: 1.6 MG/DL — SIGNIFICANT CHANGE UP (ref 1.6–2.6)
MCHC RBC-ENTMCNC: 32 PG — SIGNIFICANT CHANGE UP (ref 27–34)
MCHC RBC-ENTMCNC: 35 G/DL — SIGNIFICANT CHANGE UP (ref 32–36)
MCV RBC AUTO: 91.3 FL — SIGNIFICANT CHANGE UP (ref 80–100)
PLATELET # BLD AUTO: 195 K/UL — SIGNIFICANT CHANGE UP (ref 150–400)
POTASSIUM SERPL-MCNC: 4.5 MMOL/L — SIGNIFICANT CHANGE UP (ref 3.5–5.3)
POTASSIUM SERPL-SCNC: 4.5 MMOL/L — SIGNIFICANT CHANGE UP (ref 3.5–5.3)
RBC # BLD: 3 M/UL — LOW (ref 4.2–5.8)
RBC # FLD: 14.2 % — SIGNIFICANT CHANGE UP (ref 10.3–16.9)
SODIUM SERPL-SCNC: 138 MMOL/L — SIGNIFICANT CHANGE UP (ref 135–145)
WBC # BLD: 9.8 K/UL — SIGNIFICANT CHANGE UP (ref 3.8–10.5)
WBC # FLD AUTO: 9.8 K/UL — SIGNIFICANT CHANGE UP (ref 3.8–10.5)

## 2017-08-04 PROCEDURE — 99233 SBSQ HOSP IP/OBS HIGH 50: CPT

## 2017-08-04 PROCEDURE — 71010: CPT | Mod: 26

## 2017-08-04 PROCEDURE — 74230 X-RAY XM SWLNG FUNCJ C+: CPT | Mod: 26

## 2017-08-04 RX ORDER — LANOLIN ALCOHOL/MO/W.PET/CERES
3 CREAM (GRAM) TOPICAL ONCE
Qty: 0 | Refills: 0 | Status: COMPLETED | OUTPATIENT
Start: 2017-08-04 | End: 2017-08-05

## 2017-08-04 RX ORDER — SENNA PLUS 8.6 MG/1
2 TABLET ORAL AT BEDTIME
Qty: 0 | Refills: 0 | Status: DISCONTINUED | OUTPATIENT
Start: 2017-08-04 | End: 2017-08-10

## 2017-08-04 RX ORDER — METHADONE HYDROCHLORIDE 40 MG/1
50 TABLET ORAL DAILY
Qty: 0 | Refills: 0 | Status: DISCONTINUED | OUTPATIENT
Start: 2017-08-04 | End: 2017-08-10

## 2017-08-04 RX ORDER — INSULIN GLARGINE 100 [IU]/ML
10 INJECTION, SOLUTION SUBCUTANEOUS AT BEDTIME
Qty: 0 | Refills: 0 | Status: DISCONTINUED | OUTPATIENT
Start: 2017-08-04 | End: 2017-08-10

## 2017-08-04 RX ORDER — INSULIN LISPRO 100/ML
3 VIAL (ML) SUBCUTANEOUS
Qty: 0 | Refills: 0 | Status: DISCONTINUED | OUTPATIENT
Start: 2017-08-04 | End: 2017-08-05

## 2017-08-04 RX ORDER — MAGNESIUM SULFATE 500 MG/ML
2 VIAL (ML) INJECTION ONCE
Qty: 0 | Refills: 0 | Status: COMPLETED | OUTPATIENT
Start: 2017-08-04 | End: 2017-08-04

## 2017-08-04 RX ORDER — ACETAMINOPHEN 500 MG
650 TABLET ORAL EVERY 6 HOURS
Qty: 0 | Refills: 0 | Status: DISCONTINUED | OUTPATIENT
Start: 2017-08-04 | End: 2017-08-10

## 2017-08-04 RX ORDER — DOCUSATE SODIUM 100 MG
100 CAPSULE ORAL DAILY
Qty: 0 | Refills: 0 | Status: DISCONTINUED | OUTPATIENT
Start: 2017-08-04 | End: 2017-08-10

## 2017-08-04 RX ORDER — QUETIAPINE FUMARATE 200 MG/1
25 TABLET, FILM COATED ORAL EVERY 6 HOURS
Qty: 0 | Refills: 0 | Status: DISCONTINUED | OUTPATIENT
Start: 2017-08-04 | End: 2017-08-07

## 2017-08-04 RX ADMIN — HEPARIN SODIUM 5000 UNIT(S): 5000 INJECTION INTRAVENOUS; SUBCUTANEOUS at 06:43

## 2017-08-04 RX ADMIN — HALOPERIDOL DECANOATE 2 MILLIGRAM(S): 100 INJECTION INTRAMUSCULAR at 01:31

## 2017-08-04 RX ADMIN — Medication 3 MILLILITER(S): at 12:36

## 2017-08-04 RX ADMIN — Medication 2: at 17:54

## 2017-08-04 RX ADMIN — HEPARIN SODIUM 5000 UNIT(S): 5000 INJECTION INTRAVENOUS; SUBCUTANEOUS at 13:15

## 2017-08-04 RX ADMIN — HEPARIN SODIUM 5000 UNIT(S): 5000 INJECTION INTRAVENOUS; SUBCUTANEOUS at 22:56

## 2017-08-04 RX ADMIN — Medication 1 MILLIGRAM(S): at 12:36

## 2017-08-04 RX ADMIN — Medication 4: at 09:15

## 2017-08-04 RX ADMIN — PIPERACILLIN AND TAZOBACTAM 200 GRAM(S): 4; .5 INJECTION, POWDER, LYOPHILIZED, FOR SOLUTION INTRAVENOUS at 06:43

## 2017-08-04 RX ADMIN — Medication 3 UNIT(S): at 17:54

## 2017-08-04 RX ADMIN — Medication 50 GRAM(S): at 14:50

## 2017-08-04 RX ADMIN — PIPERACILLIN AND TAZOBACTAM 200 GRAM(S): 4; .5 INJECTION, POWDER, LYOPHILIZED, FOR SOLUTION INTRAVENOUS at 17:53

## 2017-08-04 RX ADMIN — SENNA PLUS 2 TABLET(S): 8.6 TABLET ORAL at 22:58

## 2017-08-04 RX ADMIN — INSULIN GLARGINE 10 UNIT(S): 100 INJECTION, SOLUTION SUBCUTANEOUS at 23:00

## 2017-08-04 RX ADMIN — PIPERACILLIN AND TAZOBACTAM 200 GRAM(S): 4; .5 INJECTION, POWDER, LYOPHILIZED, FOR SOLUTION INTRAVENOUS at 00:11

## 2017-08-04 RX ADMIN — PIPERACILLIN AND TAZOBACTAM 200 GRAM(S): 4; .5 INJECTION, POWDER, LYOPHILIZED, FOR SOLUTION INTRAVENOUS at 12:35

## 2017-08-04 RX ADMIN — QUETIAPINE FUMARATE 50 MILLIGRAM(S): 200 TABLET, FILM COATED ORAL at 22:57

## 2017-08-04 RX ADMIN — Medication 1 TABLET(S): at 12:35

## 2017-08-04 RX ADMIN — Medication 2: at 13:17

## 2017-08-04 RX ADMIN — Medication 3 MILLILITER(S): at 17:52

## 2017-08-04 RX ADMIN — Medication 100 MILLIGRAM(S): at 12:36

## 2017-08-04 RX ADMIN — Medication 3 MILLILITER(S): at 06:45

## 2017-08-04 RX ADMIN — Medication 4: at 22:59

## 2017-08-04 RX ADMIN — Medication 100 MILLIGRAM(S): at 06:44

## 2017-08-04 RX ADMIN — Medication 100 MILLIGRAM(S): at 13:16

## 2017-08-04 RX ADMIN — METHADONE HYDROCHLORIDE 50 MILLIGRAM(S): 40 TABLET ORAL at 12:36

## 2017-08-04 RX ADMIN — Medication 100 MILLIGRAM(S): at 22:58

## 2017-08-04 NOTE — PROGRESS NOTE ADULT - PROBLEM SELECTOR PLAN 4
Patient with history of DM, on home medications Levemer 5 u qHS and Janumet 1000/50 daily.   -HbA1c 6.7%  -On ISS with basal (Lantus 10u qHS)/nutritional dose (Lispro 3 u pre meal)  -continue to monitor FS and adjust medications accordingly if continued need for corrective insulin

## 2017-08-04 NOTE — PROGRESS NOTE ADULT - PROBLEM SELECTOR PLAN 3
Patient with recent diagnosis of cirrhosis. No known history of esophageal varices; no ascites noted on exam.  -US Abdomen performed revealed mildly nodular morphology c/w cirrhosis, slightly enlarged (16 cm), biliary sludge (no cholecystitis)

## 2017-08-04 NOTE — SWALLOW VFSS/MBS ASSESSMENT ADULT - COMMENTS
66M prior diagnosis of dementia, PMH CKD, HCV, cirrhosis, was found sleeping in the street by EMS after leaving home.  Pt's son is a nurse at [employer] and reported that patient has "years-long history of cognitive deterioration with recent more progressive decline," particularly w increasing confusion/agitation in the two weeks prior to hospitalization.      8/3/17 CXR revealed "Newly developing upper and right midlung zone patchy opacities. In view of the short time course of these findings differential diagnosis includes developing ARDS, multifocal pneumonia and asymmetric pulmonary edema."

## 2017-08-04 NOTE — SWALLOW VFSS/MBS ASSESSMENT ADULT - ADDITIONAL RECOMMENDATIONS
1) Given reported progressive cognitive decline and pt's son's reports that pt is lethargic/somnolent after his dose of methadone, it is unclear whether pt will be alert enough t/o the day to take in his body's caloric needs via PO. And given pt's fluctuating alertness, confusion and agitation, it is also unclear whether pt will cooperate w this modified diet. Pt would benefit from clarification/documentation re long-term feeding goals. If pt/family decide that pt should opt for an unrestricted diet for comfort/QOL despite risks of asp/PNA, pls allow pt to resume his baseline diet of [facility] soft/thin liquids.     2) This SVC will f/u to monitor diet tolerance and ?diet advancement

## 2017-08-04 NOTE — PROGRESS NOTE ADULT - PROBLEM SELECTOR PLAN 5
Patient with prior history of heroin abuse; on Methadone 220 mg since age 20 (verified with methadone clinic/patient's son). Dose reduced while inpatient to 150 -->100 -->50 mg (starting 8/4). Patient somnolent after dosing. Will continue to monitor patient for signs of withdrawal.  -Methadone known as a QT prolonging medication, EKG - .

## 2017-08-04 NOTE — PROGRESS NOTE ADULT - ASSESSMENT
67 y/o male PMHx of prior heroin abuse (on Methadone), Hepatitis C (dx date UNK, untreated), recently diagnosed liver cirrhosis, CKD (stage 3), mild dementia presenting on 8/1 as a transfer for [facility] ED with history of recent fall and altered mentation, agitation, and paranoia. CT head negative for intracranial bleed. AMS likely 2/2 to pseudodementia 2/2 to severe depression vs toxic metabolic encephalopathy of unclear etiology. Here in [place] for further management. 67 y/o male PMHx of prior heroin abuse (on Methadone), Hepatitis C (dx date UNK, untreated), recently diagnosed liver cirrhosis, CKD (stage 3), mild dementia presenting on 8/1 as a transfer for [facility] ED with history of recent fall and altered mentation, agitation, and paranoia. CT head negative for intracranial bleed. AMS likely multifactorial in etiology; likely 2/2 to pseudodementia 2/2 to severe depression vs toxic metabolic encephalopathy of unclear etiology. Patient became septic 8/3 likely secondary to aspiration pneumonia (CXR revealing RLL infiltrate) as patient somnolent on prior methadone regimen, now s/p dose reduction to 50 mg starting 8/4. Currently treating for aspiration PNA with Zosyn (Day 2 - 8/4).

## 2017-08-04 NOTE — SWALLOW VFSS/MBS ASSESSMENT ADULT - SLP GENERAL OBSERVATIONS
Pt was calm and cooperative w this study, but had difficulty consistently following directions. Pt also had difficulty sitting still upright without leaning forward, during this study.

## 2017-08-04 NOTE — PROGRESS NOTE BEHAVIORAL HEALTH - NSBHFUPINTERVALHXFT_PSY_A_CORE
Per chart and team, patient received haldol 2 mg IM x1 overnight for agitation.  Received methadone 150 mg PO yesterday.  This morning patient was sedated, woke to name called but unable to answer questions.  Nodded "no" for pain but otherwise unable to maintain wakefulness.  Per team, methadone decreased to 50 mg PO today given patient's sedation.

## 2017-08-04 NOTE — PROGRESS NOTE ADULT - PROBLEM SELECTOR PLAN 6
Patient with known history of hepatitis C (unknown diagnosis date, untreated).   -Per PCP, Hepatitis C not treated.

## 2017-08-04 NOTE — SWALLOW VFSS/MBS ASSESSMENT ADULT - PHARYNGEAL PHASE COMMENTS
Delayed swallow initiation; worst w thin liquids and nectar-thick liquids. Swallow was triggered after bolus had pooled at pyriforms. SILENT deep pen w thin liquids, which pt was unable to clear w cued cough/throat-clear. It is likely pt eventually aspirated this penetrated material. Penetration w nectar-thick liquids. Trace pen w all other consistencies before the swallow, as the bolus tracely spilled over the epiglottis prior to swallow initiation. Decreased BOT/pharyngeal contractions resulted in diffuse post-deglutitive residue. Pt was not sensate to this and required cueing to initiate secondary swallows.

## 2017-08-04 NOTE — SWALLOW VFSS/MBS ASSESSMENT ADULT - RECOMMENDED FEEDING/EATING TECHNIQUES
alternate food with liquid/small sips/bites/maintain upright posture during/after eating for 30 mins/position upright (90 degrees)

## 2017-08-04 NOTE — PROGRESS NOTE ADULT - PROBLEM SELECTOR PLAN 9
Seen by nutrition and speech/swallow team. Given inconsistencies with speech and swallow evals and patient's somnolence, patient at risk for aspiration. Barium swallow performed today, patient at risk for aspiration. Put on a dysphagia diet/aspiration precautions.

## 2017-08-04 NOTE — SWALLOW VFSS/MBS ASSESSMENT ADULT - DIAGNOSTIC IMPRESSIONS
Pt p/w moderate oropharyngeal dysphagia. Oral stage c/b reduced efficiency of bolus formation and A-P transport resulting in prolonged oral phase, posterior premature spillage and oral residue (after the swallow). Pt was not sensate to the residue and benefited from cueing to initiate secondary swallows. Pharyngeal stage was c/b severe delay of swallow initiation resulting in SILENT deep penetration (which pt was unable to clear and likely eventually resulted in aspiration) w thin liquids & penetration with nectar-thick liquids. Although no chaim aspiration was noted w NTL, given the severity of delay in setting of pt's difficulty sitting upright, inability to self-administer small cup sips, and fluctuating alertness, there is risk of aspiration w this consistency. Swallow trigger timeliness and coordination improved w more viscous consistencies and only trace penetration was noted before the swallow w honey-thick liquids, puree and mech soft solids. Decreased strength of swallow resulted in post-deglutitive residue, which was reduced w CUED throat-clears.     Given these observations, pt would benefit from a modified diet of mech soft w honey-thick liquids at this time. Given pt's reduced sensation (as evidenced by required cueing to clear oral and pharyngeal residue after the swallow), pt would benefit from aspiration precautions including 1:1 assistance w meals. Pt's swallow may improve as MS and overall strength improves.

## 2017-08-04 NOTE — PROGRESS NOTE ADULT - PROBLEM SELECTOR PLAN 10
F: No fluids indicated at this time  E: Replete electrolytes as needed; K>4; Mg>2  N: on aspiration precautions, dysphagia diet    DVT ppx: Heparin SubQ 5000 u q8h (moderate risk)  CODE: FULL  Dispo: [id number]

## 2017-08-04 NOTE — SWALLOW VFSS/MBS ASSESSMENT ADULT - ORAL PHASE COMMENTS
Prolonged oral transit. Posterior premature bolus loss. Pt required cueing to initiate a secondary swallow to clear the residue.

## 2017-08-04 NOTE — PROGRESS NOTE ADULT - PROBLEM SELECTOR PLAN 2
Patient meeting 2/4 SIRS criteria (temp 100.3, tachycardia) overnight on 8/1, afebrile today.   -UA negative, unlikely urinary source.   -No WBC (6.3 - 8/2), unlikely infectious in etiology  -Blood cultures obtained and sent 8/2, no growth to date  -CXR revealing confluent opacity noted over the left lower lung zone which may represent developing left lower lobe pneumonia and/or atelectasis. Patient developed severe sepsis 8/3 (afternoon), (Temp 103.1, Tachycardic 116, no leukocytosis, lactate 2.6) likely 2/2 to aspiration PNA. See Chart note for details.   -Blood cx -no growth to date, not bacteremic  -Lactate 2.6 -->downtrended to 1.6 s/p 1500cc fluid resuscitation  -CXR revealing a RLL infiltrate likely 2/2 to aspiration. Speech and swallow eval performed. Given patient's somnolence and inconsistencies with evals, barium swallow performed 8/4. Patient considered aspiration risk and put on dysphagia diet today.   -Started Zosyn 3.375 g q6h on 8/3 given CXR findings, elevated lactate. c/w current abx regimen.   -Patient afebrile today, no leukocytosis, will f/u with blood cultures to see if anything grows after 24 hours.

## 2017-08-04 NOTE — PROGRESS NOTE ADULT - PROBLEM SELECTOR PLAN 7
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 Son notes decline over the last couple of weeks.   -Managed by PCP with Ativan 2 mg TID PRN. Will hold Ativan. No need to monitor CIWA at this time. 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 Son notes decline over the last couple of weeks.   -Managed by PCP with Ativan 2 mg TID PRN. Will hold Ativan. No need to monitor CIWA at this time.  -Psych following; Appreciate recs: Seroquel 50 mg qHS for sundowning, Seroquel 25 mg PO PRN q6h for agitation; Haldol 2mg PO PRN or Haldol 2 mg IM PRN for agitation

## 2017-08-04 NOTE — PROGRESS NOTE BEHAVIORAL HEALTH - SUMMARY
66M prior diagnosis of dementia, PMH CKD, HCV, cirrhosis, BIBEMS after leaving home.  Per son patient has years-long history of cognitive deterioration with recent more progressive decline.  Patient's behavioral disregulation likely in setting of sundowning although presentation complicated by history of benzodiazepine and methadone use, with some element of dehydration/malnutrition.  Yesterday with fevers, possible aspiration pneumonia.  No evidence of benzodiazepine withdrawal.  Agree with minimizing sedating/deliriogenic medications, and monitor for signs of opiate withdrawal (diarrhea, sweating, dilated pupils, lacrimation etc) that could worsen agitation.

## 2017-08-04 NOTE — PROGRESS NOTE ADULT - PROBLEM SELECTOR PLAN 1
Patient presented to [facility] with AMS s/p fall, agitation, paranoia. History of drug abuse (on methadone). Per patient's son, patient has been reported as a missing person for the last week. His behavior has become more erratic over the last two weeks and has been reported to have gone to multiple EDs over the past week seeking methadone. 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 Toxic metabolic encephalopathy vs infectious process lower on ddx.  -Patient not aggressive overnight. Patient received additional 50 mg Methadone dose (in addition to morning 100 mg dose) and PM dose of Seroquel 50 mg per psych recommendations. Patient somnolent this AM, patient being continued on methadone at a reduced dose (150 mg). Will consider reducing dose to 125 mg daily if patient is overly sedated from 150 dose.  -Psych on board; patient's behavior unlikely 2/2 to underlying psychiatric disorder. Per PCP (Dr Orozco), patient history of severe depression after wife's death 2 years ago (suicide attempt last year). Managed at home with Ativan 2 mg TID PRN. AMS likely pseudodementia 2/2 to depression. Recommend Seroquel 50 mg qHS for sundowning, Haldol PO 2 mg q4h PRN (first, if agitated overnight) or Haldol IM 2 mg q4h PRN (2nd option for agitation).  -Patient tremulous on exam; patient with history of Benzo abuse at home (Ativan 2 mg TID), likely 2/2 to withdrawal; continue to monitor patient's CIWA q4h and give Ativan 1 mg PRN. Patient presented to [facility] with AMS s/p fall, agitation, paranoia. History of drug abuse (on methadone). Per patient's son, patient has been reported as a missing person for the last week. His behavior has become more erratic over the last two weeks and has been reported to have gone to multiple EDs over the past week seeking methadone. AMS likely multifactorial; likely pseudodementia 2/2 severe depression. Toxic metabolic encephalopathy vs infectious process lower on ddx.  -Patient became agitated overnight and given IM Haldol x1.   -Psych on board; patient's behavior unlikely 2/2 to underlying psychiatric disorder. Per PCP (Dr Orozco), patient history of severe depression after wife's death 2 years ago (suicide attempt last year). Managed at home with Ativan 2 mg TID PRN. Recommend Seroquel 50 mg qHS for sundowning, Haldol PO 2 mg q4h PRN (first, if agitated overnight) or Haldol IM 2 mg q4h PRN (2nd option for agitation). Seroquel 25 mg PO q6h PRN for agitation   -Patient less tremulous on exam today; no need to monitor CIWA score; Patient's methadone dose decreased to 50 mg today; continue to monitor for signs/symptoms of opioid withdrawal in light of recent Methadone dose reductions.

## 2017-08-04 NOTE — PROGRESS NOTE BEHAVIORAL HEALTH - NSBHCONSULTMEDS_PSY_A_CORE FT
1. Continue methadone 50 mg PO daily as per primary team  2. Continue Seroquel 50 mg PO HS  3. If patient is consistently agitated in the morning prior to methadone dose, can give Seroquel 25 mg PO at 9am or administer methadone earlier  4. Continue haldol 2 mg PO or IM q4h prn for agitation; offer PO first and if patient is unwilling or unable to take, give IM

## 2017-08-04 NOTE — PROGRESS NOTE ADULT - SUBJECTIVE AND OBJECTIVE BOX
OVERNIGHT EVENTS: Patient agitated overnight around 1:30 am, given Haldol IM x1. No other overnight events.    SUBJECTIVE / INTERVAL HPI: Patient seen and examined at bedside. Somnolent at bedside, unable to go over ROS with patient this AM.    VITAL SIGNS:  Vital Signs Last 24 Hrs  T(C): 36.8 (04 Aug 2017 09:14), Max: 37.5 (03 Aug 2017 19:28)  T(F): 98.2 (04 Aug 2017 09:14), Max: 99.5 (03 Aug 2017 19:28)  HR: 95 (04 Aug 2017 09:14) (88 - 110)  BP: 114/74 (04 Aug 2017 09:14) (112/68 - 147/71)  BP(mean): 87 (04 Aug 2017 09:14) (87 - 87)  RR: 15 (04 Aug 2017 09:14) (14 - 26)  SpO2: 97% (04 Aug 2017 09:14) (93% - 97%)    PHYSICAL EXAM:    General: thin, frail gentleman, NAD, somnolent, lying in bed; responsive to name; incoherent when responding; disheveled  HEENT: NC/AT; PERRL, anicteric sclera; MMM, edentulous  Neck: supple  Lymph: no cervical or supraclavicular LAD  Cardiovascular: +S1/S2; RRR, no m/r/g appreciated on auscultation  Respiratory: CTA B/L; no W/R/R appreciated; poor inspiratory effort as patient somnolent during encounter  Gastrointestinal: soft, NT/ND; +BSx4  Extremities: no edema, clubbing or cyanosis  Vascular: 2+ radial, DP/PT pulses B/L  Neurological: arousable to name; unable to assess orientation    MEDICATIONS:  MEDICATIONS  (STANDING):  thiamine 100 milliGRAM(s) Oral three times a day  folic acid 1 milliGRAM(s) Oral daily  multivitamin 1 Tablet(s) Oral daily  heparin  Injectable 5000 Unit(s) SubCutaneous every 8 hours  QUEtiapine 50 milliGRAM(s) Oral at bedtime  insulin lispro (HumaLOG) corrective regimen sliding scale   SubCutaneous Before meals and at bedtime  dextrose 5%. 1000 milliLiter(s) (50 mL/Hr) IV Continuous <Continuous>  dextrose 50% Injectable 12.5 Gram(s) IV Push once  dextrose 50% Injectable 25 Gram(s) IV Push once  dextrose 50% Injectable 25 Gram(s) IV Push once  piperacillin/tazobactam IVPB. 3.375 Gram(s) IV Intermittent every 6 hours  ALBUTerol/ipratropium for Nebulization 3 milliLiter(s) Nebulizer every 6 hours  docusate sodium 100 milliGRAM(s) Oral daily  senna 2 Tablet(s) Oral at bedtime  methadone    Tablet 50 milliGRAM(s) Oral daily  insulin glargine Injectable (LANTUS) 10 Unit(s) SubCutaneous at bedtime  insulin lispro Injectable (HumaLOG) 3 Unit(s) SubCutaneous three times a day before meals  magnesium sulfate  IVPB 2 Gram(s) IV Intermittent once    MEDICATIONS  (PRN):  haloperidol     Tablet 2 milliGRAM(s) Oral every 4 hours PRN agitation  haloperidol    Injectable 2 milliGRAM(s) IntraMuscular every 4 hours PRN Agitation  dextrose Gel 1 Dose(s) Oral once PRN Blood Glucose LESS THAN 70 milliGRAM(s)/deciliter  glucagon  Injectable 1 milliGRAM(s) IntraMuscular once PRN Glucose LESS THAN 70 milligrams/deciliter  acetaminophen    Suspension 650 milliGRAM(s) Oral every 6 hours PRN For Temp greater than 38 C (100.4 F)  QUEtiapine 25 milliGRAM(s) Oral every 6 hours PRN agitation      ALLERGIES:  Allergies    No Known Allergies    Intolerances        LABS:                        9.6    9.8   )-----------( 195      ( 04 Aug 2017 07:12 )             27.4     08-04    138  |  101  |  9   ----------------------------<  160<H>  4.5   |  25  |  1.10    Ca    8.3<L>      04 Aug 2017 07:12  Mg     1.6     08-04    TPro  5.9<L>  /  Alb  2.4<L>  /  TBili  1.0  /  DBili  0.5<H>  /  AST  63<H>  /  ALT  52<H>  /  AlkPhos  51  08-02    PT/INR - ( 03 Aug 2017 14:27 )   PT: 12.5 sec;   INR: 1.12          PTT - ( 03 Aug 2017 14:27 )  PTT:31.0 sec    CAPILLARY BLOOD GLUCOSE  191 (04 Aug 2017 11:30)          RADIOLOGY & ADDITIONAL TESTS: Reviewed.    ASSESSMENT:    PLAN: OVERNIGHT EVENTS: Patient agitated overnight around 1:30 am, given Haldol IM x1. No other overnight events.    SUBJECTIVE / INTERVAL HPI: Patient seen and examined at bedside. Somnolent at bedside, unable to go over ROS with patient this AM.    VITAL SIGNS:  Vital Signs Last 24 Hrs  T(C): 36.8 (04 Aug 2017 09:14), Max: 37.5 (03 Aug 2017 19:28)  T(F): 98.2 (04 Aug 2017 09:14), Max: 99.5 (03 Aug 2017 19:28)  HR: 95 (04 Aug 2017 09:14) (88 - 110)  BP: 114/74 (04 Aug 2017 09:14) (112/68 - 147/71)  BP(mean): 87 (04 Aug 2017 09:14) (87 - 87)  RR: 15 (04 Aug 2017 09:14) (14 - 26)  SpO2: 97% (04 Aug 2017 09:14) (93% - 97%)    PHYSICAL EXAM:    General: thin, frail gentleman, NAD, somnolent, lying in bed; responsive to name; incoherent when responding; disheveled  HEENT: NC/AT; PERRL, anicteric sclera; MMM, edentulous, tremor of lower lip  Neck: supple  Lymph: no cervical or supraclavicular LAD  Cardiovascular: +S1/S2; RRR, no m/r/g appreciated on auscultation  Respiratory: CTA B/L; no W/R/R appreciated; poor inspiratory effort as patient somnolent during encounter  Gastrointestinal: soft, NT/ND; +BSx4  Extremities: no edema, clubbing or cyanosis  Vascular: 2+ radial, DP/PT pulses B/L  Neurological: arousable to name; unable to assess orientation    MEDICATIONS:  MEDICATIONS  (STANDING):  thiamine 100 milliGRAM(s) Oral three times a day  folic acid 1 milliGRAM(s) Oral daily  multivitamin 1 Tablet(s) Oral daily  heparin  Injectable 5000 Unit(s) SubCutaneous every 8 hours  QUEtiapine 50 milliGRAM(s) Oral at bedtime  insulin lispro (HumaLOG) corrective regimen sliding scale   SubCutaneous Before meals and at bedtime  dextrose 5%. 1000 milliLiter(s) (50 mL/Hr) IV Continuous <Continuous>  dextrose 50% Injectable 12.5 Gram(s) IV Push once  dextrose 50% Injectable 25 Gram(s) IV Push once  dextrose 50% Injectable 25 Gram(s) IV Push once  piperacillin/tazobactam IVPB. 3.375 Gram(s) IV Intermittent every 6 hours  ALBUTerol/ipratropium for Nebulization 3 milliLiter(s) Nebulizer every 6 hours  docusate sodium 100 milliGRAM(s) Oral daily  senna 2 Tablet(s) Oral at bedtime  methadone    Tablet 50 milliGRAM(s) Oral daily  insulin glargine Injectable (LANTUS) 10 Unit(s) SubCutaneous at bedtime  insulin lispro Injectable (HumaLOG) 3 Unit(s) SubCutaneous three times a day before meals  magnesium sulfate  IVPB 2 Gram(s) IV Intermittent once    MEDICATIONS  (PRN):  haloperidol     Tablet 2 milliGRAM(s) Oral every 4 hours PRN agitation  haloperidol    Injectable 2 milliGRAM(s) IntraMuscular every 4 hours PRN Agitation  dextrose Gel 1 Dose(s) Oral once PRN Blood Glucose LESS THAN 70 milliGRAM(s)/deciliter  glucagon  Injectable 1 milliGRAM(s) IntraMuscular once PRN Glucose LESS THAN 70 milligrams/deciliter  acetaminophen    Suspension 650 milliGRAM(s) Oral every 6 hours PRN For Temp greater than 38 C (100.4 F)  QUEtiapine 25 milliGRAM(s) Oral every 6 hours PRN agitation      ALLERGIES:  Allergies    No Known Allergies    Intolerances        LABS:                        9.6    9.8   )-----------( 195      ( 04 Aug 2017 07:12 )             27.4     08-04    138  |  101  |  9   ----------------------------<  160<H>  4.5   |  25  |  1.10    Ca    8.3<L>      04 Aug 2017 07:12  Mg     1.6     08-04    TPro  5.9<L>  /  Alb  2.4<L>  /  TBili  1.0  /  DBili  0.5<H>  /  AST  63<H>  /  ALT  52<H>  /  AlkPhos  51  08-02    PT/INR - ( 03 Aug 2017 14:27 )   PT: 12.5 sec;   INR: 1.12          PTT - ( 03 Aug 2017 14:27 )  PTT:31.0 sec    CAPILLARY BLOOD GLUCOSE  191 (04 Aug 2017 11:30)          RADIOLOGY & ADDITIONAL TESTS: Reviewed.    ASSESSMENT:    PLAN:

## 2017-08-05 LAB
ANION GAP SERPL CALC-SCNC: 10 MMOL/L — SIGNIFICANT CHANGE UP (ref 5–17)
BUN SERPL-MCNC: 12 MG/DL — SIGNIFICANT CHANGE UP (ref 7–23)
CALCIUM SERPL-MCNC: 8.5 MG/DL — SIGNIFICANT CHANGE UP (ref 8.4–10.5)
CHLORIDE SERPL-SCNC: 101 MMOL/L — SIGNIFICANT CHANGE UP (ref 96–108)
CO2 SERPL-SCNC: 28 MMOL/L — SIGNIFICANT CHANGE UP (ref 22–31)
CREAT SERPL-MCNC: 1.1 MG/DL — SIGNIFICANT CHANGE UP (ref 0.5–1.3)
GLUCOSE SERPL-MCNC: 165 MG/DL — HIGH (ref 70–99)
HCT VFR BLD CALC: 25.2 % — LOW (ref 39–50)
HGB BLD-MCNC: 8.5 G/DL — LOW (ref 13–17)
MAGNESIUM SERPL-MCNC: 1.8 MG/DL — SIGNIFICANT CHANGE UP (ref 1.6–2.6)
MCHC RBC-ENTMCNC: 31.1 PG — SIGNIFICANT CHANGE UP (ref 27–34)
MCHC RBC-ENTMCNC: 33.7 G/DL — SIGNIFICANT CHANGE UP (ref 32–36)
MCV RBC AUTO: 92.3 FL — SIGNIFICANT CHANGE UP (ref 80–100)
PLATELET # BLD AUTO: 168 K/UL — SIGNIFICANT CHANGE UP (ref 150–400)
POTASSIUM SERPL-MCNC: 4.5 MMOL/L — SIGNIFICANT CHANGE UP (ref 3.5–5.3)
POTASSIUM SERPL-SCNC: 4.5 MMOL/L — SIGNIFICANT CHANGE UP (ref 3.5–5.3)
RBC # BLD: 2.73 M/UL — LOW (ref 4.2–5.8)
RBC # FLD: 14.3 % — SIGNIFICANT CHANGE UP (ref 10.3–16.9)
SODIUM SERPL-SCNC: 139 MMOL/L — SIGNIFICANT CHANGE UP (ref 135–145)
WBC # BLD: 4.8 K/UL — SIGNIFICANT CHANGE UP (ref 3.8–10.5)
WBC # FLD AUTO: 4.8 K/UL — SIGNIFICANT CHANGE UP (ref 3.8–10.5)

## 2017-08-05 PROCEDURE — 99233 SBSQ HOSP IP/OBS HIGH 50: CPT

## 2017-08-05 RX ORDER — AMPICILLIN SODIUM AND SULBACTAM SODIUM 250; 125 MG/ML; MG/ML
INJECTION, POWDER, FOR SUSPENSION INTRAMUSCULAR; INTRAVENOUS
Qty: 0 | Refills: 0 | Status: DISCONTINUED | OUTPATIENT
Start: 2017-08-05 | End: 2017-08-06

## 2017-08-05 RX ORDER — INSULIN LISPRO 100/ML
4 VIAL (ML) SUBCUTANEOUS
Qty: 0 | Refills: 0 | Status: DISCONTINUED | OUTPATIENT
Start: 2017-08-05 | End: 2017-08-10

## 2017-08-05 RX ORDER — AMPICILLIN SODIUM AND SULBACTAM SODIUM 250; 125 MG/ML; MG/ML
3 INJECTION, POWDER, FOR SUSPENSION INTRAMUSCULAR; INTRAVENOUS ONCE
Qty: 0 | Refills: 0 | Status: COMPLETED | OUTPATIENT
Start: 2017-08-05 | End: 2017-08-05

## 2017-08-05 RX ORDER — AMPICILLIN SODIUM AND SULBACTAM SODIUM 250; 125 MG/ML; MG/ML
3 INJECTION, POWDER, FOR SUSPENSION INTRAMUSCULAR; INTRAVENOUS EVERY 6 HOURS
Qty: 0 | Refills: 0 | Status: DISCONTINUED | OUTPATIENT
Start: 2017-08-05 | End: 2017-08-06

## 2017-08-05 RX ADMIN — QUETIAPINE FUMARATE 25 MILLIGRAM(S): 200 TABLET, FILM COATED ORAL at 23:43

## 2017-08-05 RX ADMIN — QUETIAPINE FUMARATE 50 MILLIGRAM(S): 200 TABLET, FILM COATED ORAL at 21:53

## 2017-08-05 RX ADMIN — METHADONE HYDROCHLORIDE 50 MILLIGRAM(S): 40 TABLET ORAL at 11:07

## 2017-08-05 RX ADMIN — SENNA PLUS 2 TABLET(S): 8.6 TABLET ORAL at 21:53

## 2017-08-05 RX ADMIN — Medication 4: at 21:51

## 2017-08-05 RX ADMIN — Medication 100 MILLIGRAM(S): at 21:53

## 2017-08-05 RX ADMIN — PIPERACILLIN AND TAZOBACTAM 200 GRAM(S): 4; .5 INJECTION, POWDER, LYOPHILIZED, FOR SOLUTION INTRAVENOUS at 06:57

## 2017-08-05 RX ADMIN — Medication 3 MILLILITER(S): at 23:43

## 2017-08-05 RX ADMIN — AMPICILLIN SODIUM AND SULBACTAM SODIUM 200 GRAM(S): 250; 125 INJECTION, POWDER, FOR SUSPENSION INTRAMUSCULAR; INTRAVENOUS at 14:41

## 2017-08-05 RX ADMIN — Medication 3 MILLILITER(S): at 17:22

## 2017-08-05 RX ADMIN — Medication 3 MILLILITER(S): at 11:08

## 2017-08-05 RX ADMIN — Medication 100 MILLIGRAM(S): at 11:07

## 2017-08-05 RX ADMIN — Medication 2: at 18:05

## 2017-08-05 RX ADMIN — HEPARIN SODIUM 5000 UNIT(S): 5000 INJECTION INTRAVENOUS; SUBCUTANEOUS at 06:57

## 2017-08-05 RX ADMIN — Medication 3 UNIT(S): at 09:04

## 2017-08-05 RX ADMIN — QUETIAPINE FUMARATE 25 MILLIGRAM(S): 200 TABLET, FILM COATED ORAL at 17:22

## 2017-08-05 RX ADMIN — Medication 3 UNIT(S): at 18:05

## 2017-08-05 RX ADMIN — Medication 3 UNIT(S): at 12:54

## 2017-08-05 RX ADMIN — Medication 3 MILLILITER(S): at 06:58

## 2017-08-05 RX ADMIN — AMPICILLIN SODIUM AND SULBACTAM SODIUM 200 GRAM(S): 250; 125 INJECTION, POWDER, FOR SUSPENSION INTRAMUSCULAR; INTRAVENOUS at 18:05

## 2017-08-05 RX ADMIN — Medication 100 MILLIGRAM(S): at 06:58

## 2017-08-05 RX ADMIN — Medication 100 MILLIGRAM(S): at 13:35

## 2017-08-05 RX ADMIN — AMPICILLIN SODIUM AND SULBACTAM SODIUM 200 GRAM(S): 250; 125 INJECTION, POWDER, FOR SUSPENSION INTRAMUSCULAR; INTRAVENOUS at 23:43

## 2017-08-05 RX ADMIN — Medication 1 MILLIGRAM(S): at 11:07

## 2017-08-05 RX ADMIN — Medication 6: at 12:53

## 2017-08-05 RX ADMIN — Medication 3 MILLIGRAM(S): at 00:17

## 2017-08-05 RX ADMIN — Medication 3 MILLILITER(S): at 00:18

## 2017-08-05 RX ADMIN — HEPARIN SODIUM 5000 UNIT(S): 5000 INJECTION INTRAVENOUS; SUBCUTANEOUS at 13:35

## 2017-08-05 RX ADMIN — HEPARIN SODIUM 5000 UNIT(S): 5000 INJECTION INTRAVENOUS; SUBCUTANEOUS at 21:51

## 2017-08-05 RX ADMIN — PIPERACILLIN AND TAZOBACTAM 200 GRAM(S): 4; .5 INJECTION, POWDER, LYOPHILIZED, FOR SOLUTION INTRAVENOUS at 00:17

## 2017-08-05 RX ADMIN — INSULIN GLARGINE 10 UNIT(S): 100 INJECTION, SOLUTION SUBCUTANEOUS at 21:52

## 2017-08-05 RX ADMIN — Medication 1 TABLET(S): at 11:07

## 2017-08-05 RX ADMIN — PIPERACILLIN AND TAZOBACTAM 200 GRAM(S): 4; .5 INJECTION, POWDER, LYOPHILIZED, FOR SOLUTION INTRAVENOUS at 11:06

## 2017-08-05 NOTE — PROGRESS NOTE ADULT - SUBJECTIVE AND OBJECTIVE BOX
Patient is a 66y old  Male who presented when found wandering the streets, now undergoing tx for aspiration PNA.     INTERVAL HPI/OVERNIGHT EVENTS:  No overnight events.   Pt reports severe anxiety, good appetite, feels trapped, feels dry,     Review of Systems: 12 point review of systems otherwise negative  ( - )fevers/chills  ( - ) dyspnea  ( - ) cough  ( - ) chest pain  ( - ) palpatations  ( - ) dizziness/lightheadedness  ( - ) nausea/vomiting  ( - ) abd pain  ( - ) diarrhea  ( - ) melena  ( - ) hematochezia  ( - ) dysuria  ( - ) hematuria  ( - ) leg swelling  ( -) calf tenderness  ( + ) motor weakness  ( - ) extremity numbness  ( x ) back pain  ( + ) tolerating POs    MEDICATIONS  (STANDING):  thiamine 100 milliGRAM(s) Oral three times a day  folic acid 1 milliGRAM(s) Oral daily  multivitamin 1 Tablet(s) Oral daily  heparin  Injectable 5000 Unit(s) SubCutaneous every 8 hours  QUEtiapine 50 milliGRAM(s) Oral at bedtime  insulin lispro (HumaLOG) corrective regimen sliding scale   SubCutaneous Before meals and at bedtime  dextrose 5%. 1000 milliLiter(s) (50 mL/Hr) IV Continuous <Continuous>  dextrose 50% Injectable 12.5 Gram(s) IV Push once  dextrose 50% Injectable 25 Gram(s) IV Push once  dextrose 50% Injectable 25 Gram(s) IV Push once  piperacillin/tazobactam IVPB. 3.375 Gram(s) IV Intermittent every 6 hours  ALBUTerol/ipratropium for Nebulization 3 milliLiter(s) Nebulizer every 6 hours  docusate sodium 100 milliGRAM(s) Oral daily  senna 2 Tablet(s) Oral at bedtime  methadone    Tablet 50 milliGRAM(s) Oral daily  insulin glargine Injectable (LANTUS) 10 Unit(s) SubCutaneous at bedtime  insulin lispro Injectable (HumaLOG) 3 Unit(s) SubCutaneous three times a day before meals    MEDICATIONS  (PRN):  haloperidol    Injectable 2 milliGRAM(s) IntraMuscular every 4 hours PRN Agitation  dextrose Gel 1 Dose(s) Oral once PRN Blood Glucose LESS THAN 70 milliGRAM(s)/deciliter  glucagon  Injectable 1 milliGRAM(s) IntraMuscular once PRN Glucose LESS THAN 70 milligrams/deciliter  acetaminophen    Suspension 650 milliGRAM(s) Oral every 6 hours PRN For Temp greater than 38 C (100.4 F)  QUEtiapine 25 milliGRAM(s) Oral every 6 hours PRN agitation    Allergies    No Known Allergies    Intolerances          Vital Signs Last 24 Hrs  T(C): 36.6 (05 Aug 2017 12:11), Max: 37.6 (04 Aug 2017 22:35)  T(F): 97.9 (05 Aug 2017 12:11), Max: 99.6 (04 Aug 2017 22:35)  HR: 106 (05 Aug 2017 12:11) (83 - 106)  BP: 157/69 (05 Aug 2017 12:11) (105/67 - 157/69)  BP(mean): --  RR: 22 (05 Aug 2017 12:11) (15 - 22)  SpO2: 96% (05 Aug 2017 12:11) (95% - 99%)  CAPILLARY BLOOD GLUCOSE  265 (05 Aug 2017 11:06)  142 (05 Aug 2017 07:20)  250 (04 Aug 2017 20:44)  192 (04 Aug 2017 17:05)    Physical Exam:        General:  disheveled,  NAD,   HEENT:  Nonicteric, PERRLA, dry MM  CV:   tachycardic, RR, no murmur, no JVD  Lungs:  CTA B/L, no wheezes, rales, rhonchi  Abdomen:  Soft, non-tender, no distended, positive BS, no hepatosplenomegaly  Extremities:  2+ pulses, no c/c, no edema  Skin:  Warm and dry, no rashes  :  No day  Neuro:  AAOx3, non-focal, CN II-XII grossly intact, hyperreflexia, tremors, asterixis    LABS:                        8.5    4.8   )-----------( 168      ( 05 Aug 2017 08:00 )             25.2     08-05    139  |  101  |  12  ----------------------------<  165<H>  4.5   |  28  |  1.10    Ca    8.5      05 Aug 2017 08:00  Mg     1.8     08-05      PT/INR - ( 03 Aug 2017 14:27 )   PT: 12.5 sec;   INR: 1.12          PTT - ( 03 Aug 2017 14:27 )  PTT:31.0 sec    A/ Patient is a 66y old  Male who presented when found wandering the streets, now undergoing tx for aspiration PNA.     INTERVAL HPI/OVERNIGHT EVENTS:  No overnight events.   Pt reports severe anxiety, good appetite, feels trapped, feels dry,     Review of Systems: 12 point review of systems otherwise negative  ( - )fevers/chills  ( - ) dyspnea  ( - ) cough  ( - ) chest pain  ( - ) palpatations  ( - ) dizziness/lightheadedness  ( - ) nausea/vomiting  ( - ) abd pain  ( - ) diarrhea  ( - ) melena  ( - ) hematochezia  ( - ) dysuria  ( - ) hematuria  ( - ) leg swelling  ( -) calf tenderness  ( + ) motor weakness  ( - ) extremity numbness  ( x ) back pain  ( + ) tolerating POs    MEDICATIONS  (STANDING):  thiamine 100 milliGRAM(s) Oral three times a day  folic acid 1 milliGRAM(s) Oral daily  multivitamin 1 Tablet(s) Oral daily  heparin  Injectable 5000 Unit(s) SubCutaneous every 8 hours  QUEtiapine 50 milliGRAM(s) Oral at bedtime  insulin lispro (HumaLOG) corrective regimen sliding scale   SubCutaneous Before meals and at bedtime  dextrose 5%. 1000 milliLiter(s) (50 mL/Hr) IV Continuous <Continuous>  dextrose 50% Injectable 12.5 Gram(s) IV Push once  dextrose 50% Injectable 25 Gram(s) IV Push once  dextrose 50% Injectable 25 Gram(s) IV Push once  piperacillin/tazobactam IVPB. 3.375 Gram(s) IV Intermittent every 6 hours  ALBUTerol/ipratropium for Nebulization 3 milliLiter(s) Nebulizer every 6 hours  docusate sodium 100 milliGRAM(s) Oral daily  senna 2 Tablet(s) Oral at bedtime  methadone    Tablet 50 milliGRAM(s) Oral daily  insulin glargine Injectable (LANTUS) 10 Unit(s) SubCutaneous at bedtime  insulin lispro Injectable (HumaLOG) 3 Unit(s) SubCutaneous three times a day before meals    MEDICATIONS  (PRN):  haloperidol    Injectable 2 milliGRAM(s) IntraMuscular every 4 hours PRN Agitation  dextrose Gel 1 Dose(s) Oral once PRN Blood Glucose LESS THAN 70 milliGRAM(s)/deciliter  glucagon  Injectable 1 milliGRAM(s) IntraMuscular once PRN Glucose LESS THAN 70 milligrams/deciliter  acetaminophen    Suspension 650 milliGRAM(s) Oral every 6 hours PRN For Temp greater than 38 C (100.4 F)  QUEtiapine 25 milliGRAM(s) Oral every 6 hours PRN agitation    Allergies    No Known Allergies    Intolerances          Vital Signs Last 24 Hrs  T(C): 36.6 (05 Aug 2017 12:11), Max: 37.6 (04 Aug 2017 22:35)  T(F): 97.9 (05 Aug 2017 12:11), Max: 99.6 (04 Aug 2017 22:35)  HR: 106 (05 Aug 2017 12:11) (83 - 106)  BP: 157/69 (05 Aug 2017 12:11) (105/67 - 157/69)  BP(mean): --  RR: 22 (05 Aug 2017 12:11) (15 - 22)  SpO2: 96% (05 Aug 2017 12:11) (95% - 99%)  CAPILLARY BLOOD GLUCOSE  265 (05 Aug 2017 11:06)  142 (05 Aug 2017 07:20)  250 (04 Aug 2017 20:44)  192 (04 Aug 2017 17:05)    Physical Exam:        General:  disheveled,  NAD,   HEENT:  Nonicteric, PERRLA, dry MM  CV:   tachycardic, RR, no murmur, no JVD  Lungs:  CTA B/L, no wheezes, rales, rhonchi  Abdomen:  Soft, non-tender, no distended, positive BS, no hepatosplenomegaly  Extremities:  2+ pulses, no c/c, no edema  Skin:  Warm and dry, no rashes  :  No day  Neuro:  AAOx3, non-focal, CN II-XII grossly intact, hyperreflexia, tremors, asterixis    LABS:                        8.5    4.8   )-----------( 168      ( 05 Aug 2017 08:00 )             25.2     08-05    139  |  101  |  12  ----------------------------<  165<H>  4.5   |  28  |  1.10    Ca    8.5      05 Aug 2017 08:00  Mg     1.8     08-05      PT/INR - ( 03 Aug 2017 14:27 )   PT: 12.5 sec;   INR: 1.12          PTT - ( 03 Aug 2017 14:27 )  PTT:31.0 sec    A/P 65 yo M with hx of HCV Cirrhosis, IVDA on methadone presenting after found wandering with course complicated by aspiration PNA, now improved.   1.  AMS - pt intermittently alert, but at times agitated requiring haldol.  Also likely complicated by metabolic encephalopathy and possible hepatic encephalopathy.    - continue haldol prn,   - can consider lactulose if pt is not having regular BMs  - continue seroquel  - continue methadone  2.  aspiration PNA - can continue unasyn for now, if pt remains stable can transition to augmentin, low risk for pseudomonas  3.  cirrhosis - can consider outpt HCV tx.    4.  DM - pt with high sugar foods at bedside, will attempt adherence to diabetic diet, increase lispro to 4 units TID wtih meals  5.  CKD - Cr improving, will CTM  6.  ppx - hsq for dvt ppx  7.  dispo - accepted at [facility] for next week.

## 2017-08-06 LAB
ANION GAP SERPL CALC-SCNC: 10 MMOL/L — SIGNIFICANT CHANGE UP (ref 5–17)
BUN SERPL-MCNC: 11 MG/DL — SIGNIFICANT CHANGE UP (ref 7–23)
CALCIUM SERPL-MCNC: 8.7 MG/DL — SIGNIFICANT CHANGE UP (ref 8.4–10.5)
CHLORIDE SERPL-SCNC: 104 MMOL/L — SIGNIFICANT CHANGE UP (ref 96–108)
CO2 SERPL-SCNC: 28 MMOL/L — SIGNIFICANT CHANGE UP (ref 22–31)
CREAT SERPL-MCNC: 1.1 MG/DL — SIGNIFICANT CHANGE UP (ref 0.5–1.3)
GLUCOSE SERPL-MCNC: 79 MG/DL — SIGNIFICANT CHANGE UP (ref 70–99)
HCT VFR BLD CALC: 26.5 % — LOW (ref 39–50)
HGB BLD-MCNC: 8.9 G/DL — LOW (ref 13–17)
MAGNESIUM SERPL-MCNC: 1.7 MG/DL — SIGNIFICANT CHANGE UP (ref 1.6–2.6)
MCHC RBC-ENTMCNC: 31.3 PG — SIGNIFICANT CHANGE UP (ref 27–34)
MCHC RBC-ENTMCNC: 33.6 G/DL — SIGNIFICANT CHANGE UP (ref 32–36)
MCV RBC AUTO: 93.3 FL — SIGNIFICANT CHANGE UP (ref 80–100)
PLATELET # BLD AUTO: 176 K/UL — SIGNIFICANT CHANGE UP (ref 150–400)
POTASSIUM SERPL-MCNC: 4.3 MMOL/L — SIGNIFICANT CHANGE UP (ref 3.5–5.3)
POTASSIUM SERPL-SCNC: 4.3 MMOL/L — SIGNIFICANT CHANGE UP (ref 3.5–5.3)
RBC # BLD: 2.84 M/UL — LOW (ref 4.2–5.8)
RBC # FLD: 14.2 % — SIGNIFICANT CHANGE UP (ref 10.3–16.9)
SODIUM SERPL-SCNC: 142 MMOL/L — SIGNIFICANT CHANGE UP (ref 135–145)
WBC # BLD: 4 K/UL — SIGNIFICANT CHANGE UP (ref 3.8–10.5)
WBC # FLD AUTO: 4 K/UL — SIGNIFICANT CHANGE UP (ref 3.8–10.5)

## 2017-08-06 PROCEDURE — 99232 SBSQ HOSP IP/OBS MODERATE 35: CPT

## 2017-08-06 RX ORDER — LANOLIN ALCOHOL/MO/W.PET/CERES
3 CREAM (GRAM) TOPICAL AT BEDTIME
Qty: 0 | Refills: 0 | Status: DISCONTINUED | OUTPATIENT
Start: 2017-08-06 | End: 2017-08-10

## 2017-08-06 RX ORDER — LOSARTAN POTASSIUM 100 MG/1
50 TABLET, FILM COATED ORAL DAILY
Qty: 0 | Refills: 0 | Status: DISCONTINUED | OUTPATIENT
Start: 2017-08-06 | End: 2017-08-08

## 2017-08-06 RX ORDER — MAGNESIUM SULFATE 500 MG/ML
2 VIAL (ML) INJECTION ONCE
Qty: 0 | Refills: 0 | Status: COMPLETED | OUTPATIENT
Start: 2017-08-06 | End: 2017-08-06

## 2017-08-06 RX ADMIN — AMPICILLIN SODIUM AND SULBACTAM SODIUM 200 GRAM(S): 250; 125 INJECTION, POWDER, FOR SUSPENSION INTRAMUSCULAR; INTRAVENOUS at 05:55

## 2017-08-06 RX ADMIN — Medication 1 MILLIGRAM(S): at 11:16

## 2017-08-06 RX ADMIN — Medication 1 TABLET(S): at 17:28

## 2017-08-06 RX ADMIN — Medication 4: at 12:19

## 2017-08-06 RX ADMIN — INSULIN GLARGINE 10 UNIT(S): 100 INJECTION, SOLUTION SUBCUTANEOUS at 22:13

## 2017-08-06 RX ADMIN — Medication 3 MILLILITER(S): at 05:57

## 2017-08-06 RX ADMIN — LOSARTAN POTASSIUM 50 MILLIGRAM(S): 100 TABLET, FILM COATED ORAL at 23:31

## 2017-08-06 RX ADMIN — Medication 3 MILLILITER(S): at 23:31

## 2017-08-06 RX ADMIN — QUETIAPINE FUMARATE 25 MILLIGRAM(S): 200 TABLET, FILM COATED ORAL at 15:14

## 2017-08-06 RX ADMIN — HEPARIN SODIUM 5000 UNIT(S): 5000 INJECTION INTRAVENOUS; SUBCUTANEOUS at 22:13

## 2017-08-06 RX ADMIN — QUETIAPINE FUMARATE 25 MILLIGRAM(S): 200 TABLET, FILM COATED ORAL at 09:35

## 2017-08-06 RX ADMIN — Medication 100 MILLIGRAM(S): at 05:57

## 2017-08-06 RX ADMIN — Medication 100 MILLIGRAM(S): at 11:16

## 2017-08-06 RX ADMIN — QUETIAPINE FUMARATE 50 MILLIGRAM(S): 200 TABLET, FILM COATED ORAL at 22:13

## 2017-08-06 RX ADMIN — Medication 4 UNIT(S): at 09:32

## 2017-08-06 RX ADMIN — Medication 3 MILLILITER(S): at 11:18

## 2017-08-06 RX ADMIN — Medication 100 GRAM(S): at 12:19

## 2017-08-06 RX ADMIN — QUETIAPINE FUMARATE 25 MILLIGRAM(S): 200 TABLET, FILM COATED ORAL at 22:47

## 2017-08-06 RX ADMIN — METHADONE HYDROCHLORIDE 50 MILLIGRAM(S): 40 TABLET ORAL at 11:16

## 2017-08-06 RX ADMIN — Medication 4 UNIT(S): at 17:29

## 2017-08-06 RX ADMIN — HEPARIN SODIUM 5000 UNIT(S): 5000 INJECTION INTRAVENOUS; SUBCUTANEOUS at 13:39

## 2017-08-06 RX ADMIN — Medication 100 MILLIGRAM(S): at 22:14

## 2017-08-06 RX ADMIN — Medication 3 MILLILITER(S): at 17:28

## 2017-08-06 RX ADMIN — SENNA PLUS 2 TABLET(S): 8.6 TABLET ORAL at 22:14

## 2017-08-06 RX ADMIN — Medication 1 TABLET(S): at 11:16

## 2017-08-06 RX ADMIN — AMPICILLIN SODIUM AND SULBACTAM SODIUM 200 GRAM(S): 250; 125 INJECTION, POWDER, FOR SUSPENSION INTRAMUSCULAR; INTRAVENOUS at 11:14

## 2017-08-06 RX ADMIN — Medication 100 MILLIGRAM(S): at 13:39

## 2017-08-06 RX ADMIN — Medication 4 UNIT(S): at 12:19

## 2017-08-06 RX ADMIN — HEPARIN SODIUM 5000 UNIT(S): 5000 INJECTION INTRAVENOUS; SUBCUTANEOUS at 05:55

## 2017-08-06 RX ADMIN — Medication 2: at 22:13

## 2017-08-06 NOTE — PROGRESS NOTE ADULT - PROBLEM SELECTOR PLAN 1
Patient presented to [facility] with AMS s/p fall, agitation, paranoia. History of drug abuse (on methadone). Per patient's son, patient has been reported as a missing person for the last week. His behavior has become more erratic over the last two weeks and has been reported to have gone to multiple EDs over the past week seeking methadone. AMS likely multifactorial; likely pseudodementia 2/2 severe depression. Toxic metabolic encephalopathy vs infectious process lower on ddx.  -Patient became agitated overnight and given IM Haldol x1.   -Psych on board; patient's behavior unlikely 2/2 to underlying psychiatric disorder. Per PCP (Dr Orozco), patient history of severe depression after wife's death 2 years ago (suicide attempt last year). Managed at home with Ativan 2 mg TID PRN. Recommend Seroquel 50 mg qHS for sundowning, Haldol PO 2 mg q4h PRN (first, if agitated overnight) or Haldol IM 2 mg q4h PRN (2nd option for agitation). Seroquel 25 mg PO q6h PRN for agitation   -Patient less tremulous on exam today; no need to monitor CIWA score; Patient's methadone dose decreased to 50 mg today; continue to monitor for signs/symptoms of opioid withdrawal in light of recent Methadone dose reductions. Patient presented to [facility] with AMS s/p fall, agitation, paranoia. History of drug abuse (on methadone). Per patient's son, patient has been reported as a missing person for the last week. His behavior has become more erratic over the last two weeks and has been reported to have gone to multiple EDs over the past week seeking methadone. AMS likely multifactorial; likely pseudodementia 2/2 severe depression. Toxic metabolic encephalopathy vs infectious process lower on ddx.  -Psych on board; patient's behavior unlikely 2/2 to underlying psychiatric disorder. Per PCP (Dr Orozco), patient history of severe depression after wife's death 2 years ago (suicide attempt last year). Managed at home with Ativan 2 mg TID PRN. Recommend Seroquel 50 mg qHS for sundowning, Haldol PO 2 mg q4h PRN (first, if agitated overnight) or Haldol IM 2 mg q4h PRN (2nd option for agitation). Seroquel 25 mg PO q6h PRN for agitation   -Patient less tremulous on exam today; no need to monitor CIWA score; c/w methadone 50 mg daily. Patient much more alert on this dose; continue to monitor for signs/symptoms of opioid withdrawal in light of recent Methadone dose reductions.

## 2017-08-06 NOTE — PROGRESS NOTE ADULT - PROBLEM SELECTOR PLAN 2
Patient developed severe sepsis 8/3 (afternoon), (Temp 103.1, Tachycardic 116, no leukocytosis, lactate 2.6) likely 2/2 to aspiration PNA. See Chart note for details.   -Blood cx -no growth to date, not bacteremic  -Lactate 2.6 -->downtrended to 1.6 s/p 1500cc fluid resuscitation  -CXR revealing a RLL infiltrate likely 2/2 to aspiration. Speech and swallow eval performed. Given patient's somnolence and inconsistencies with evals, barium swallow performed 8/4. Patient considered aspiration risk and put on dysphagia diet today.   -Started Zosyn 3.375 g q6h on 8/3 given CXR findings, elevated lactate. c/w current abx regimen.   -Patient afebrile today, no leukocytosis, will f/u with blood cultures to see if anything grows after 24 hours. Resolved. Patient developed severe sepsis 8/3 (afternoon), (Temp 103.1, Tachycardic 116, no leukocytosis, lactate 2.6) likely 2/2 to aspiration PNA. See Chart note for details.   -Blood cx -no growth to date, not bacteremic  -Lactate 2.6 -->downtrended to 1.6 s/p 1500cc fluid resuscitation  -CXR revealing a RLL infiltrate likely 2/2 to aspiration. Barium swallow --> aspiration risk. On aspiration precautions/dysphagia diet.  -Blood cultures -No growth to date  -Started on Zosyn 8/4 (x2 days) --> switched to Unasyn on 8/5 as unlikely patient requires pseudomonal coverage. Will consider changing to PO meds if patient stable. Resolved. Patient developed severe sepsis 8/3 (afternoon), (Temp 103.1, Tachycardic 116, no leukocytosis, lactate 2.6) likely 2/2 to aspiration PNA. See Chart note for details.   -Blood cx -no growth to date, not bacteremic  -Lactate 2.6 -->downtrended to 1.6 s/p 1500cc fluid resuscitation  -CXR revealing a RLL infiltrate likely 2/2 to aspiration. Barium swallow --> aspiration risk. On aspiration precautions/dysphagia diet.  -Blood cultures -No growth to date  -Started on Zosyn 8/4 (x2 days) --> switched to Unasyn on 8/5 as unlikely patient requires pseudomonal coverage. Switched antibiotics to Augmentin 875 mg BID today as patient tolerating PO intake.

## 2017-08-06 NOTE — PROGRESS NOTE ADULT - PROBLEM SELECTOR PLAN 9
c/w dysphagia diet/aspiration precautions. Barium swallow suggest patient is an aspiration risk. Patient much more alert on 50 mg Methadone dose.

## 2017-08-06 NOTE — PROGRESS NOTE ADULT - PROBLEM SELECTOR PLAN 5
Patient with prior history of heroin abuse; on Methadone 220 mg since age 20 (verified with methadone clinic/patient's son). Dose reduced while inpatient to 150 -->100 -->50 mg (starting 8/4). Patient somnolent after dosing. Will continue to monitor patient for signs of withdrawal.  -Methadone known as a QT prolonging medication, EKG - . Patient with prior history of heroin abuse; on Methadone 220 mg since age 20 (verified with methadone clinic/patient's son). Dose reduced while inpatient to 150 -->100 -->50 mg (starting 8/4). Patient somnolent after dosing. Will continue to monitor patient for signs of withdrawal. No reported nausea/vomiting/diarrhea overnight. Patient tremulous and notes feeling anxious.

## 2017-08-06 NOTE — PROGRESS NOTE ADULT - PROBLEM SELECTOR PLAN 6
Patient with known history of hepatitis C (unknown diagnosis date, untreated).   -Per PCP, Hepatitis C not treated. Patient with known history of hepatitis C likely 2/2 to heroin abuse (unknown diagnosis date, untreated).   -Per PCP, Hepatitis C not treated.

## 2017-08-06 NOTE — PROGRESS NOTE ADULT - PROBLEM SELECTOR PLAN 4
Patient with history of DM, on home medications Levemer 5 u qHS and Janumet 1000/50 daily.   -HbA1c 6.7%  -On ISS with basal (Lantus 10u qHS)/nutritional dose (Lispro 3 u pre meal)  -continue to monitor FS and adjust medications accordingly if continued need for corrective insulin -HbA1c 6.7%  -On ISS with basal (Lantus 10u qHS)/nutritional dose (Lispro 4 u pre meal)  -continue to monitor FS and adjust medications accordingly if continued need for corrective insulin

## 2017-08-06 NOTE — PROGRESS NOTE ADULT - ASSESSMENT
67 y/o male PMHx of prior heroin abuse (on Methadone), Hepatitis C (dx date UNK, untreated), recently diagnosed liver cirrhosis, CKD (stage 3), mild dementia presenting on 8/1 as a transfer for [facility] ED with history of recent fall and altered mentation, agitation, and paranoia. CT head negative for intracranial bleed. AMS likely multifactorial in etiology; likely 2/2 to pseudodementia 2/2 to severe depression vs toxic metabolic encephalopathy of unclear etiology. Patient became septic 8/3 likely secondary to aspiration pneumonia (CXR revealing RLL infiltrate) as patient somnolent on prior methadone regimen, now s/p dose reduction to 50 mg starting 8/4. Currently treating for aspiration PNA, initially with Zosyn x2 days, switched to Unasyn on 8/5 (Day 2- 8/6). 67 y/o male PMHx of prior heroin abuse (on Methadone), Hepatitis C (dx date UNK, untreated), recently diagnosed liver cirrhosis, CKD (stage 3), mild dementia presenting on 8/1 as a transfer for [facility] ED with history of recent fall and altered mentation, agitation, and paranoia. CT head negative for intracranial bleed. AMS likely multifactorial in etiology; likely 2/2 to pseudodementia 2/2 to severe depression vs toxic metabolic encephalopathy of unclear etiology. Patient became septic 8/3 likely secondary to aspiration pneumonia (CXR revealing RLL infiltrate) as patient somnolent on prior methadone regimen, now s/p dose reduction to 50 mg starting 8/4. Currently treating for aspiration PNA, initially with Zosyn x2 days, switched to Unasyn on 8/5; patient tolerating PO intake and switched to Augmentin 875 mg BID starting today (8/6/17).

## 2017-08-06 NOTE — PROGRESS NOTE ADULT - PROBLEM SELECTOR PLAN 7
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 Son notes decline over the last couple of weeks.   -Managed by PCP with Ativan 2 mg TID PRN. Will hold Ativan. No need to monitor CIWA at this time.  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 Son notes decline over the last couple of weeks.   -Managed by PCP with Ativan 2 mg TID PRN. Will hold Ativan. Patient still tremulous/anxious however no associated reported nausea/vomiting/diarrhea. No need to monitor CIWA at this time.  -Psych following; Appreciate recs: c/w Seroquel 50 mg qHS for sundowning, Seroquel 25 mg PO PRN q6h for agitation; Haldol 2mg PO PRN or Haldol 2 mg IM PRN for agitation

## 2017-08-06 NOTE — PROGRESS NOTE ADULT - SUBJECTIVE AND OBJECTIVE BOX
OVERNIGHT EVENTS: No overnight events.    SUBJECTIVE / INTERVAL HPI: Patient seen and examined at bedside. Patient notes feeling anxious and tremulous since he hasn't been getting his regular Methadone dose here in the hospital. Patient denies fevers, chills, shortness of breath. Remainder of ROS negative.    VITAL SIGNS:  Vital Signs Last 24 Hrs  T(C): 36.4 (06 Aug 2017 11:35), Max: 36.8 (05 Aug 2017 18:21)  T(F): 97.6 (06 Aug 2017 11:35), Max: 98.3 (05 Aug 2017 18:21)  HR: 95 (06 Aug 2017 11:35) (91 - 106)  BP: 148/67 (06 Aug 2017 11:35) (146/74 - 157/69)  BP(mean): --  RR: 19 (06 Aug 2017 11:35) (18 - 22)  SpO2: 94% (06 Aug 2017 11:35) (94% - 98%)    PHYSICAL EXAM:    General: thin gentleman, calm, cooperative, NAD, pleasant this morning, disheveled   HEENT: NC/AT; PERRL, anicteric sclera; MMM, edentulous  Neck: supple  Lymph: no cervical or supraclavicular LAD  Cardiovascular: +S1/S2; RRR, no m/r/g appreciated on auscultation  Respiratory: CTA B/L; slightly decreased breath sounds at R lung base  Gastrointestinal: soft, NT/ND; +BSx4, no ascites  Extremities: no edema, clubbing or cyanosis  Vascular: 2+ radial, DP/PT pulses B/L  Neurological: no focal deficits; alert    MEDICATIONS:  MEDICATIONS  (STANDING):  thiamine 100 milliGRAM(s) Oral three times a day  folic acid 1 milliGRAM(s) Oral daily  multivitamin 1 Tablet(s) Oral daily  heparin  Injectable 5000 Unit(s) SubCutaneous every 8 hours  QUEtiapine 50 milliGRAM(s) Oral at bedtime  insulin lispro (HumaLOG) corrective regimen sliding scale   SubCutaneous Before meals and at bedtime  dextrose 5%. 1000 milliLiter(s) (50 mL/Hr) IV Continuous <Continuous>  dextrose 50% Injectable 12.5 Gram(s) IV Push once  dextrose 50% Injectable 25 Gram(s) IV Push once  dextrose 50% Injectable 25 Gram(s) IV Push once  ALBUTerol/ipratropium for Nebulization 3 milliLiter(s) Nebulizer every 6 hours  docusate sodium 100 milliGRAM(s) Oral daily  senna 2 Tablet(s) Oral at bedtime  methadone    Tablet 50 milliGRAM(s) Oral daily  insulin glargine Injectable (LANTUS) 10 Unit(s) SubCutaneous at bedtime  ampicillin/sulbactam  IVPB   IV Intermittent   ampicillin/sulbactam  IVPB 3 Gram(s) IV Intermittent every 6 hours  insulin lispro Injectable (HumaLOG) 4 Unit(s) SubCutaneous three times a day before meals  magnesium sulfate  IVPB 2 Gram(s) IV Intermittent once    MEDICATIONS  (PRN):  haloperidol    Injectable 2 milliGRAM(s) IntraMuscular every 4 hours PRN Agitation  dextrose Gel 1 Dose(s) Oral once PRN Blood Glucose LESS THAN 70 milliGRAM(s)/deciliter  glucagon  Injectable 1 milliGRAM(s) IntraMuscular once PRN Glucose LESS THAN 70 milligrams/deciliter  acetaminophen    Suspension 650 milliGRAM(s) Oral every 6 hours PRN For Temp greater than 38 C (100.4 F)  QUEtiapine 25 milliGRAM(s) Oral every 6 hours PRN agitation      ALLERGIES:  Allergies    No Known Allergies    Intolerances        LABS:                        8.9    4.0   )-----------( 176      ( 06 Aug 2017 07:24 )             26.5     08-06    142  |  104  |  11  ----------------------------<  79  4.3   |  28  |  1.10    Ca    8.7      06 Aug 2017 07:24  Mg     1.7     08-06          CAPILLARY BLOOD GLUCOSE  229 (06 Aug 2017 11:31)          RADIOLOGY & ADDITIONAL TESTS: Reviewed.    ASSESSMENT:    PLAN:

## 2017-08-06 NOTE — PROGRESS NOTE ADULT - PROBLEM SELECTOR PLAN 8
Patient presenting with Creatinine 1.25, GFR 44 on admission (CKD stage 3); unknown baseline.   -Creatinine 1.10 today; continue to trend.

## 2017-08-07 DIAGNOSIS — F32.9 MAJOR DEPRESSIVE DISORDER, SINGLE EPISODE, UNSPECIFIED: ICD-10-CM

## 2017-08-07 DIAGNOSIS — E11.9 TYPE 2 DIABETES MELLITUS WITHOUT COMPLICATIONS: ICD-10-CM

## 2017-08-07 DIAGNOSIS — F33.0 MAJOR DEPRESSIVE DISORDER, RECURRENT, MILD: ICD-10-CM

## 2017-08-07 LAB
ANION GAP SERPL CALC-SCNC: 12 MMOL/L — SIGNIFICANT CHANGE UP (ref 5–17)
BUN SERPL-MCNC: 16 MG/DL — SIGNIFICANT CHANGE UP (ref 7–23)
CALCIUM SERPL-MCNC: 9.2 MG/DL — SIGNIFICANT CHANGE UP (ref 8.4–10.5)
CHLORIDE SERPL-SCNC: 98 MMOL/L — SIGNIFICANT CHANGE UP (ref 96–108)
CO2 SERPL-SCNC: 25 MMOL/L — SIGNIFICANT CHANGE UP (ref 22–31)
CREAT SERPL-MCNC: 1.2 MG/DL — SIGNIFICANT CHANGE UP (ref 0.5–1.3)
CULTURE RESULTS: SIGNIFICANT CHANGE UP
CULTURE RESULTS: SIGNIFICANT CHANGE UP
GLUCOSE SERPL-MCNC: 164 MG/DL — HIGH (ref 70–99)
HCT VFR BLD CALC: 31.8 % — LOW (ref 39–50)
HGB BLD-MCNC: 10.8 G/DL — LOW (ref 13–17)
MAGNESIUM SERPL-MCNC: 1.8 MG/DL — SIGNIFICANT CHANGE UP (ref 1.6–2.6)
MCHC RBC-ENTMCNC: 31 PG — SIGNIFICANT CHANGE UP (ref 27–34)
MCHC RBC-ENTMCNC: 34 G/DL — SIGNIFICANT CHANGE UP (ref 32–36)
MCV RBC AUTO: 91.4 FL — SIGNIFICANT CHANGE UP (ref 80–100)
PLATELET # BLD AUTO: 222 K/UL — SIGNIFICANT CHANGE UP (ref 150–400)
POTASSIUM SERPL-MCNC: 4.4 MMOL/L — SIGNIFICANT CHANGE UP (ref 3.5–5.3)
POTASSIUM SERPL-SCNC: 4.4 MMOL/L — SIGNIFICANT CHANGE UP (ref 3.5–5.3)
RBC # BLD: 3.48 M/UL — LOW (ref 4.2–5.8)
RBC # FLD: 13.9 % — SIGNIFICANT CHANGE UP (ref 10.3–16.9)
SODIUM SERPL-SCNC: 135 MMOL/L — SIGNIFICANT CHANGE UP (ref 135–145)
SPECIMEN SOURCE: SIGNIFICANT CHANGE UP
SPECIMEN SOURCE: SIGNIFICANT CHANGE UP
WBC # BLD: 4.6 K/UL — SIGNIFICANT CHANGE UP (ref 3.8–10.5)
WBC # FLD AUTO: 4.6 K/UL — SIGNIFICANT CHANGE UP (ref 3.8–10.5)

## 2017-08-07 PROCEDURE — 99233 SBSQ HOSP IP/OBS HIGH 50: CPT

## 2017-08-07 RX ORDER — POLYETHYLENE GLYCOL 3350 17 G/17G
17 POWDER, FOR SOLUTION ORAL
Qty: 0 | Refills: 0 | Status: DISCONTINUED | OUTPATIENT
Start: 2017-08-07 | End: 2017-08-08

## 2017-08-07 RX ORDER — LOSARTAN POTASSIUM 100 MG/1
50 TABLET, FILM COATED ORAL ONCE
Qty: 0 | Refills: 0 | Status: COMPLETED | OUTPATIENT
Start: 2017-08-07 | End: 2017-08-07

## 2017-08-07 RX ORDER — QUETIAPINE FUMARATE 200 MG/1
25 TABLET, FILM COATED ORAL DAILY
Qty: 0 | Refills: 0 | Status: DISCONTINUED | OUTPATIENT
Start: 2017-08-08 | End: 2017-08-10

## 2017-08-07 RX ORDER — POLYETHYLENE GLYCOL 3350 17 G/17G
17 POWDER, FOR SOLUTION ORAL
Qty: 0 | Refills: 0 | Status: DISCONTINUED | OUTPATIENT
Start: 2017-08-07 | End: 2017-08-07

## 2017-08-07 RX ORDER — MAGNESIUM SULFATE 500 MG/ML
1 VIAL (ML) INJECTION ONCE
Qty: 0 | Refills: 0 | Status: COMPLETED | OUTPATIENT
Start: 2017-08-07 | End: 2017-08-07

## 2017-08-07 RX ORDER — LACTULOSE 10 G/15ML
15 SOLUTION ORAL ONCE
Qty: 0 | Refills: 0 | Status: COMPLETED | OUTPATIENT
Start: 2017-08-07 | End: 2017-08-07

## 2017-08-07 RX ORDER — ESCITALOPRAM OXALATE 10 MG/1
10 TABLET, FILM COATED ORAL DAILY
Qty: 0 | Refills: 0 | Status: DISCONTINUED | OUTPATIENT
Start: 2017-08-07 | End: 2017-08-10

## 2017-08-07 RX ADMIN — Medication 3 MILLILITER(S): at 06:15

## 2017-08-07 RX ADMIN — Medication 100 MILLIGRAM(S): at 12:52

## 2017-08-07 RX ADMIN — LACTULOSE 15 GRAM(S): 10 SOLUTION ORAL at 11:14

## 2017-08-07 RX ADMIN — POLYETHYLENE GLYCOL 3350 17 GRAM(S): 17 POWDER, FOR SOLUTION ORAL at 17:15

## 2017-08-07 RX ADMIN — Medication 100 MILLIGRAM(S): at 11:14

## 2017-08-07 RX ADMIN — Medication 4 UNIT(S): at 12:52

## 2017-08-07 RX ADMIN — INSULIN GLARGINE 10 UNIT(S): 100 INJECTION, SOLUTION SUBCUTANEOUS at 21:07

## 2017-08-07 RX ADMIN — LOSARTAN POTASSIUM 50 MILLIGRAM(S): 100 TABLET, FILM COATED ORAL at 21:07

## 2017-08-07 RX ADMIN — Medication 1 MILLIGRAM(S): at 11:14

## 2017-08-07 RX ADMIN — Medication 4 UNIT(S): at 17:16

## 2017-08-07 RX ADMIN — ESCITALOPRAM OXALATE 10 MILLIGRAM(S): 10 TABLET, FILM COATED ORAL at 17:15

## 2017-08-07 RX ADMIN — HEPARIN SODIUM 5000 UNIT(S): 5000 INJECTION INTRAVENOUS; SUBCUTANEOUS at 12:52

## 2017-08-07 RX ADMIN — Medication 3 MILLILITER(S): at 11:14

## 2017-08-07 RX ADMIN — Medication 1 TABLET(S): at 11:14

## 2017-08-07 RX ADMIN — QUETIAPINE FUMARATE 50 MILLIGRAM(S): 200 TABLET, FILM COATED ORAL at 21:07

## 2017-08-07 RX ADMIN — SENNA PLUS 2 TABLET(S): 8.6 TABLET ORAL at 21:07

## 2017-08-07 RX ADMIN — Medication 100 GRAM(S): at 08:37

## 2017-08-07 RX ADMIN — Medication 4: at 12:52

## 2017-08-07 RX ADMIN — METHADONE HYDROCHLORIDE 50 MILLIGRAM(S): 40 TABLET ORAL at 11:14

## 2017-08-07 RX ADMIN — Medication 100 MILLIGRAM(S): at 21:07

## 2017-08-07 RX ADMIN — Medication 1 TABLET(S): at 17:15

## 2017-08-07 RX ADMIN — POLYETHYLENE GLYCOL 3350 17 GRAM(S): 17 POWDER, FOR SOLUTION ORAL at 11:14

## 2017-08-07 RX ADMIN — LOSARTAN POTASSIUM 50 MILLIGRAM(S): 100 TABLET, FILM COATED ORAL at 12:52

## 2017-08-07 RX ADMIN — Medication 3 MILLILITER(S): at 17:15

## 2017-08-07 RX ADMIN — HEPARIN SODIUM 5000 UNIT(S): 5000 INJECTION INTRAVENOUS; SUBCUTANEOUS at 06:15

## 2017-08-07 RX ADMIN — Medication 1 TABLET(S): at 06:15

## 2017-08-07 RX ADMIN — Medication 2: at 08:37

## 2017-08-07 RX ADMIN — Medication 100 MILLIGRAM(S): at 06:16

## 2017-08-07 RX ADMIN — Medication 4 UNIT(S): at 08:38

## 2017-08-07 RX ADMIN — HEPARIN SODIUM 5000 UNIT(S): 5000 INJECTION INTRAVENOUS; SUBCUTANEOUS at 21:07

## 2017-08-07 NOTE — PROGRESS NOTE ADULT - SUBJECTIVE AND OBJECTIVE BOX
Patient is a 66y old  Male who presents with a chief complaint of confusion    INTERVAL HPI/OVERNIGHT EVENTS:    Review of Systems: 12 point review of systems otherwise negative  ( - )fevers/chills  ( - ) dyspnea  ( - ) cough  ( - ) chest pain  ( - ) palpatations  ( - ) dizziness/lightheadedness  ( - ) nausea/vomiting  ( - ) abd pain  ( - ) diarrhea  ( - ) melena  ( - ) hematochezia  ( - ) dysuria  ( - ) hematuria  ( - ) leg swelling  ( -) calf tenderness  ( - ) motor weakness  ( - ) extremity numbness  ( - ) back pain  ( + ) tolerating POs  ( + ) BM    MEDICATIONS  (STANDING):  thiamine 100 milliGRAM(s) Oral three times a day  folic acid 1 milliGRAM(s) Oral daily  multivitamin 1 Tablet(s) Oral daily  heparin  Injectable 5000 Unit(s) SubCutaneous every 8 hours  QUEtiapine 50 milliGRAM(s) Oral at bedtime  insulin lispro (HumaLOG) corrective regimen sliding scale   SubCutaneous Before meals and at bedtime  dextrose 5%. 1000 milliLiter(s) (50 mL/Hr) IV Continuous <Continuous>  dextrose 50% Injectable 12.5 Gram(s) IV Push once  dextrose 50% Injectable 25 Gram(s) IV Push once  dextrose 50% Injectable 25 Gram(s) IV Push once  ALBUTerol/ipratropium for Nebulization 3 milliLiter(s) Nebulizer every 6 hours  docusate sodium 100 milliGRAM(s) Oral daily  senna 2 Tablet(s) Oral at bedtime  methadone    Tablet 50 milliGRAM(s) Oral daily  insulin glargine Injectable (LANTUS) 10 Unit(s) SubCutaneous at bedtime  insulin lispro Injectable (HumaLOG) 4 Unit(s) SubCutaneous three times a day before meals  amoxicillin  875 milliGRAM(s)/clavulanate 1 Tablet(s) Oral two times a day  losartan 50 milliGRAM(s) Oral daily  polyethylene glycol 3350 17 Gram(s) Oral two times a day    MEDICATIONS  (PRN):  haloperidol    Injectable 2 milliGRAM(s) IntraMuscular every 4 hours PRN Agitation  dextrose Gel 1 Dose(s) Oral once PRN Blood Glucose LESS THAN 70 milliGRAM(s)/deciliter  glucagon  Injectable 1 milliGRAM(s) IntraMuscular once PRN Glucose LESS THAN 70 milligrams/deciliter  acetaminophen    Suspension 650 milliGRAM(s) Oral every 6 hours PRN For Temp greater than 38 C (100.4 F)  QUEtiapine 25 milliGRAM(s) Oral every 6 hours PRN agitation  melatonin 3 milliGRAM(s) Oral at bedtime PRN Insomnia      Allergies    No Known Allergies    Intolerances          Vital Signs Last 24 Hrs  T(C): 37.1 (07 Aug 2017 06:02), Max: 37.1 (07 Aug 2017 06:02)  T(F): 98.8 (07 Aug 2017 06:02), Max: 98.8 (07 Aug 2017 06:02)  HR: 104 (07 Aug 2017 06:02) (98 - 115)  BP: 148/82 (07 Aug 2017 06:02) (148/82 - 176/80)  BP(mean): --  RR: 21 (07 Aug 2017 06:02) (18 - 21)  SpO2: 95% (07 Aug 2017 06:02) (93% - 95%)  CAPILLARY BLOOD GLUCOSE  160 (07 Aug 2017 07:49)  162 (06 Aug 2017 21:00)  150 (06 Aug 2017 17:17)            Physical Exam:    Daily     Daily   General: NAD, cachetic  HEENT:  Nonicteric, PERRLA  CV:  RRR, no murmur, no JVD  Lungs:  Decreased BS at the bases  Abdomen:  Soft, non-tender, no distended, positive BS, no hepatosplenomegaly  Extremities:  2+ pulses, no c/c, no edema  Skin:  Warm and dry, no rashes  :  No day  Neuro:  AAOx3, non-focal, CN II-XII grossly intact  No Restraints    LABS:                        10.8   4.6   )-----------( 222      ( 07 Aug 2017 07:04 )             31.8     08-07    135  |  98  |  16  ----------------------------<  164<H>  4.4   |  25  |  1.20    Ca    9.2      07 Aug 2017 07:03  Mg     1.8     08-07

## 2017-08-07 NOTE — PROGRESS NOTE ADULT - PROBLEM SELECTOR PLAN 10
F: No fluids indicated at this time  E: Replete electrolytes as needed; K>4; Mg>2  N: on aspiration precautions, dysphagia diet (as per speech/swallow recs)    DVT ppx: Heparin SubQ 5000 u q8h (moderate risk)  CODE: FULL  Dispo: [id number]

## 2017-08-07 NOTE — PROGRESS NOTE ADULT - ASSESSMENT
65 y/o male PMHx of prior heroin abuse (on Methadone), Hepatitis C (dx date UNK, untreated), recently diagnosed liver cirrhosis, CKD (stage 3), mild dementia presenting on 8/1 as a transfer for [facility] ED with history of recent fall and altered mentation, agitation, and paranoia. CT head negative for intracranial bleed. AMS likely multifactorial in etiology; likely 2/2 to pseudodementia 2/2 to severe depression vs toxic metabolic encephalopathy of unclear etiology. Patient became septic 8/3 likely secondary to aspiration pneumonia (CXR revealing RLL infiltrate) as patient somnolent on prior methadone regimen, now s/p dose reduction to 50 mg starting 8/4. Currently treating for aspiration PNA, initially with Zosyn x2 days, switched to Unasyn on 8/5; patient tolerating PO intake and switched to Augmentin 875 mg BID starting 8/6/17.

## 2017-08-07 NOTE — PROGRESS NOTE BEHAVIORAL HEALTH - NSBHFUPINTERVALHXFT_PSY_A_CORE
Patient seen at bedside.  Mental status significantly improved over the weekend and patient was able to engage in suero interview.  Patient calm but preoccupied throughout interview with obtaining Xanax and increased dose of methadone in hospital; reports that he is in "withdrawal" and is experiencing "anxiety" although denies n/v/d, headache, AH/VH, tactile hallucinations, tearing, and sweating.  Notably had some mild perioral tremor but minimal tremor on arm extension.  Patient reports that he has been depressed since wife's passing 2 years ago (which was sudden in the setting of choking on a marshmallow, patient was present and attempted to perform Heimlich maneuver unsuccessfully).  Reports "I may have overdid it" with Xanax and methadone prior to presentation to hospital but denies any purposeful suicide attempt.  Says in the past an antidepressant has been recommended to him but he didn't want to try one.  Is currently agreeable.  Denies current SI/HI.  AAOx4, registration and recall 3/3, attention and naming intact.

## 2017-08-07 NOTE — PROGRESS NOTE ADULT - SUBJECTIVE AND OBJECTIVE BOX
OVERNIGHT EVENTS: No overnight events.    SUBJECTIVE / INTERVAL HPI: Patient seen and examined at bedside. Patient much more conversive this morning. Patient reports feeling overall weak and anxious. He also notes a decreased appetite although he has been eating. Patient noting an epigastric sensation of fullness/dull pain 7/10 in severity. Remainder of ROS negative.     VITAL SIGNS:  Vital Signs Last 24 Hrs  T(C): 37.3 (07 Aug 2017 12:58), Max: 37.3 (07 Aug 2017 12:58)  T(F): 99.1 (07 Aug 2017 12:58), Max: 99.1 (07 Aug 2017 12:58)  HR: 105 (07 Aug 2017 12:58) (98 - 115)  BP: 170/83 (07 Aug 2017 12:58) (148/82 - 170/83)  BP(mean): --  RR: 18 (07 Aug 2017 12:58) (18 - 21)  SpO2: 95% (07 Aug 2017 12:58) (93% - 95%)    PHYSICAL EXAM:    General: WDWN, cooperative, lying comfortably in bed, alert, NAD, tremulous, appears anxious  HEENT: NC/AT; PERRL, anicteric sclera; MMM, no conjunctival pallor, brown appearing discharge in posterior pharynx visible, edentulous  Neck: supple  Lymph: no cervical or supraclavicular LAD  Cardiovascular: +S1/S2; tachycardic, regular rhythm, no m/r/g appreciated on auscultation  Respiratory: CTA B/L; no W/R/R  Gastrointestinal: soft, NT/ND; +BSx4, no ascites  Extremities: no edema, clubbing or cyanosis  Vascular: 2+ radial, DP/PT pulses B/L  Neurological: alert; no focal deficits    MEDICATIONS:  MEDICATIONS  (STANDING):  thiamine 100 milliGRAM(s) Oral three times a day  folic acid 1 milliGRAM(s) Oral daily  multivitamin 1 Tablet(s) Oral daily  heparin  Injectable 5000 Unit(s) SubCutaneous every 8 hours  QUEtiapine 50 milliGRAM(s) Oral at bedtime  insulin lispro (HumaLOG) corrective regimen sliding scale   SubCutaneous Before meals and at bedtime  dextrose 5%. 1000 milliLiter(s) (50 mL/Hr) IV Continuous <Continuous>  dextrose 50% Injectable 12.5 Gram(s) IV Push once  dextrose 50% Injectable 25 Gram(s) IV Push once  dextrose 50% Injectable 25 Gram(s) IV Push once  ALBUTerol/ipratropium for Nebulization 3 milliLiter(s) Nebulizer every 6 hours  docusate sodium 100 milliGRAM(s) Oral daily  senna 2 Tablet(s) Oral at bedtime  methadone    Tablet 50 milliGRAM(s) Oral daily  insulin glargine Injectable (LANTUS) 10 Unit(s) SubCutaneous at bedtime  insulin lispro Injectable (HumaLOG) 4 Unit(s) SubCutaneous three times a day before meals  amoxicillin  875 milliGRAM(s)/clavulanate 1 Tablet(s) Oral two times a day  losartan 50 milliGRAM(s) Oral daily  polyethylene glycol 3350 17 Gram(s) Oral two times a day    MEDICATIONS  (PRN):  haloperidol    Injectable 2 milliGRAM(s) IntraMuscular every 4 hours PRN Agitation  dextrose Gel 1 Dose(s) Oral once PRN Blood Glucose LESS THAN 70 milliGRAM(s)/deciliter  glucagon  Injectable 1 milliGRAM(s) IntraMuscular once PRN Glucose LESS THAN 70 milligrams/deciliter  acetaminophen    Suspension 650 milliGRAM(s) Oral every 6 hours PRN For Temp greater than 38 C (100.4 F)  QUEtiapine 25 milliGRAM(s) Oral every 6 hours PRN agitation  melatonin 3 milliGRAM(s) Oral at bedtime PRN Insomnia      ALLERGIES:  Allergies    No Known Allergies    Intolerances        LABS:                        10.8   4.6   )-----------( 222      ( 07 Aug 2017 07:04 )             31.8     08-07    135  |  98  |  16  ----------------------------<  164<H>  4.4   |  25  |  1.20    Ca    9.2      07 Aug 2017 07:03  Mg     1.8     08-07          CAPILLARY BLOOD GLUCOSE  223 (07 Aug 2017 11:46)          RADIOLOGY & ADDITIONAL TESTS: Reviewed.    ASSESSMENT:    PLAN:

## 2017-08-07 NOTE — PROGRESS NOTE ADULT - PROBLEM SELECTOR PLAN 2
UA and blood cultures are unremarkable for infection.  US of abdomen unrevealing for ascities  Continue Augmentin for a total of 7 days of therapy

## 2017-08-07 NOTE — PROGRESS NOTE ADULT - PROBLEM SELECTOR PLAN 6
Patient with known history of hepatitis C likely 2/2 to heroin abuse (unknown diagnosis date, untreated).   -HCV - untreated

## 2017-08-07 NOTE — PROGRESS NOTE ADULT - PROBLEM SELECTOR PLAN 4
-HbA1c 6.7%  -On ISS with basal (Lantus 10u qHS)/nutritional dose (Lispro 4 u pre meal)  -continue to monitor FS and adjust medications accordingly if continued need for corrective insulin

## 2017-08-07 NOTE — PROGRESS NOTE BEHAVIORAL HEALTH - SUMMARY
66M PPH heroin use disorder on methadone, one prior psychiatric admission for suicide attempt via OD following wife's death, on prescribed benzodiazepines by PMD, unclear prior diagnosis of dementia, presenting to [facility] after wandering from home, now being treated for presumed aspiration PNA.  Patient alternately oversedated or agitated last week and given patient's son's reports of patient's slow cognitive decline and prior diagnosis of dementia patient appeared to have underlying dementia with overlying delirium/overmedication.  Patient today though appears alert with intact brief cognitive testing, indicating patient's presentation last week was largely in setting of overmedication on methadone and benzodiazepines, with potential overlying delirium 2/2 PNA.  Given patient reports depression would start Lexapro 10 mg PO daily.  Given patient reporting anxiety but denying other signs/symptoms of benzodiazepine or opiate withdrawal, would increase standing Seroquel to 25 mg PO (at 10am) and 50 mg PO HS.  This patient should not be prescribed benzodiazepines in the future because he is unable to take them appropriately.

## 2017-08-07 NOTE — PROGRESS NOTE ADULT - PROBLEM SELECTOR PLAN 2
Resolved. Patient developed severe sepsis 8/3 (afternoon), (Temp 103.1, Tachycardic 116, no leukocytosis, lactate 2.6) likely 2/2 to aspiration PNA. See Chart note for details.   -Blood cx -no growth to date, not bacteremic  -Lactate 2.6 -->downtrended to 1.6 s/p 1500cc fluid resuscitation  -CXR revealing a RLL infiltrate likely 2/2 to aspiration. Barium swallow --> aspiration risk. On aspiration precautions/dysphagia diet.  -Blood cultures -No growth to date  -Started on Zosyn 8/4 (x2 days) --> switched to Unasyn on 8/5 as unlikely patient requires pseudomonal coverage. Switched antibiotics to Augmentin 875 mg BID today as patient tolerating PO intake. Resolved. Patient developed severe sepsis 8/3 (afternoon), (Temp 103.1, Tachycardic 116, no leukocytosis, lactate 2.6) likely 2/2 to aspiration PNA. See Chart note for details.   -Blood cx -no growth to date, not bacteremic  -Lactate 2.6 -->downtrended to 1.6 s/p 1500cc fluid resuscitation  -CXR revealing a RLL infiltrate likely 2/2 to aspiration. Barium swallow --> aspiration risk. On aspiration precautions/dysphagia diet.  -Started on Zosyn 8/4 (x2 days) --> switched to Unasyn on 8/5 as unlikely patient requires pseudomonal coverage. Switched antibiotics to Augmentin 875 mg BID 8/6 as patient tolerating PO intake.

## 2017-08-07 NOTE — PROGRESS NOTE ADULT - PROBLEM SELECTOR PLAN 1
Etiology of encephalopathy multifactorial.  Patient was having intermittent  fevers along with underlying dementia/severe depression unclear if diagnosis confirmed and on high doses of methadone.  Currently mental status improving  -Continue treatment for aspiration PNA  -Keep Euvolemic   -Replete electrolytes as needed  -Avoid sedative medications unless necessary

## 2017-08-07 NOTE — PROGRESS NOTE ADULT - PROBLEM SELECTOR PLAN 1
Patient presented to [facility] with AMS s/p fall, agitation, paranoia. History of drug abuse (on methadone). Per patient's son, patient has been reported as a missing person for the last week. His behavior has become more erratic over the last two weeks and has been reported to have gone to multiple EDs over the past week seeking methadone. AMS likely multifactorial; likely pseudodementia 2/2 severe depression. Toxic metabolic encephalopathy vs infectious process lower on ddx.  -Psych on board; patient's behavior unlikely 2/2 to underlying psychiatric disorder. Per PCP (Dr Orozco), patient history of severe depression after wife's death 2 years ago (suicide attempt last year). Managed at home with Ativan 2 mg TID PRN. Recommend Seroquel 50 mg qHS for sundowning, Haldol PO 2 mg q4h PRN (first, if agitated overnight) or Haldol IM 2 mg q4h PRN (2nd option for agitation). Seroquel 25 mg PO q6h PRN for agitation   -Patient less tremulous on exam today; no need to monitor CIWA score; c/w methadone 50 mg daily. Patient much more alert on this dose; continue to monitor for signs/symptoms of opioid withdrawal in light of recent Methadone dose reductions. Patient presented to [facility] with AMS s/p fall, agitation, paranoia. History of drug abuse (on methadone). Per patient's son, patient has been reported as a missing person for the last week. His behavior has become more erratic over the last two weeks and has been reported to have gone to multiple EDs over the past week seeking methadone. 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 Per PCP (Dr Orozco), patient history of severe depression after wife's death 2 years ago (suicide attempt last year). Managed at home with Ativan 2 mg TID PRN. Recommend Seroquel 50 mg qHS for sundowning, Haldol PO 2 mg q4h PRN (first, if agitated overnight) or Haldol IM 2 mg q4h PRN (2nd option for agitation). Seroquel 25 mg PO AM    -Patient less tremulous on exam today; no need to monitor CIWA score; c/w methadone 50 mg daily. Patient much more alert on this dose; continue to monitor for signs/symptoms of opioid withdrawal in light of recent Methadone dose reductions.

## 2017-08-07 NOTE — PROGRESS NOTE BEHAVIORAL HEALTH - NSBHCONSULTMEDS_PSY_A_CORE FT
1. Lexapro 10 mg PO daily  2. Seroquel 25 mg PO 10am and 50 mg PO HS  3. Haldol 2 mg PO or IM q4h prn for agitation; offer PO first  4. Continue methadone 50 mg PO daily

## 2017-08-07 NOTE — PROGRESS NOTE ADULT - PROBLEM SELECTOR PLAN 4
As per son patient suffers from unspecified dementia.  Has intermittent episodes of severe agitation and confusion.   -Appreciate psychiatry recs

## 2017-08-07 NOTE — PROGRESS NOTE ADULT - PROBLEM SELECTOR PLAN 8
Patient presenting with Creatinine 1.25, GFR 44 on admission (CKD stage 3); unknown baseline.   -Creatinine 1.20 today; continue to trend.

## 2017-08-07 NOTE — PROGRESS NOTE ADULT - PROBLEM SELECTOR PLAN 5
Patient is on 220 mg of Methadone maintenance therapy. Considering patient underlying hepatic dysfunction along with presentation of encephalopathy would avoid such high dose due to its long half/life  -Methadone 50 mg PO QD

## 2017-08-07 NOTE — PROGRESS NOTE ADULT - PROBLEM SELECTOR PLAN 7
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 Son notes decline the 2 weeks prior to admission.   -Managed by PCP with Ativan 2 mg TID PRN. Will hold Ativan. Patient still tremulous/anxious however no associated reported nausea/vomiting/diarrhea. No need to monitor CIWA at this time.  -Psych following; Appreciate recs: c/w Seroquel 50 mg qHS for sundowning, Seroquel 25 mg PO in AM (standing - 10 am); Haldol 2mg PO PRN or Haldol 2 mg IM PRN for agitation; added Lexapro 10 mg daily today

## 2017-08-07 NOTE — PROGRESS NOTE ADULT - PROBLEM SELECTOR PLAN 5
Patient with prior history of heroin abuse; on Methadone 220 mg since age 20 (verified with methadone clinic/patient's son). Dose reduced while inpatient to 150 -->100 -->50 mg (starting 8/4). Patient somnolent after dosing. Will continue to monitor patient for signs of withdrawal. No reported nausea/vomiting/diarrhea overnight. Patient noting epigastric fullness today which he notes has been present for the last few days. Patient tremulous and notes feeling anxious. Patient with prior history of heroin abuse; on Methadone 220 mg since age 20 (verified with methadone clinic/patient's son). Dose reduced while inpatient to 150 -->100 -->50 mg (starting 8/4). Patient somnolent after dosing. Will continue to monitor patient for signs of withdrawal. No reported nausea/vomiting/diarrhea overnight. Patient noting epigastric fullness today which he notes has been present for the last few days. To be given lactulose today. Patient tremulous and notes feeling anxious.

## 2017-08-08 LAB
ANION GAP SERPL CALC-SCNC: 15 MMOL/L — SIGNIFICANT CHANGE UP (ref 5–17)
BUN SERPL-MCNC: 19 MG/DL — SIGNIFICANT CHANGE UP (ref 7–23)
CALCIUM SERPL-MCNC: 9.2 MG/DL — SIGNIFICANT CHANGE UP (ref 8.4–10.5)
CHLORIDE SERPL-SCNC: 97 MMOL/L — SIGNIFICANT CHANGE UP (ref 96–108)
CO2 SERPL-SCNC: 22 MMOL/L — SIGNIFICANT CHANGE UP (ref 22–31)
CREAT SERPL-MCNC: 1.1 MG/DL — SIGNIFICANT CHANGE UP (ref 0.5–1.3)
CULTURE RESULTS: SIGNIFICANT CHANGE UP
GLUCOSE SERPL-MCNC: 155 MG/DL — HIGH (ref 70–99)
HCT VFR BLD CALC: 34.2 % — LOW (ref 39–50)
HGB BLD-MCNC: 11.8 G/DL — LOW (ref 13–17)
MAGNESIUM SERPL-MCNC: 1.9 MG/DL — SIGNIFICANT CHANGE UP (ref 1.6–2.6)
MCHC RBC-ENTMCNC: 31.2 PG — SIGNIFICANT CHANGE UP (ref 27–34)
MCHC RBC-ENTMCNC: 34.5 G/DL — SIGNIFICANT CHANGE UP (ref 32–36)
MCV RBC AUTO: 90.5 FL — SIGNIFICANT CHANGE UP (ref 80–100)
PLATELET # BLD AUTO: 269 K/UL — SIGNIFICANT CHANGE UP (ref 150–400)
POTASSIUM SERPL-MCNC: 4.4 MMOL/L — SIGNIFICANT CHANGE UP (ref 3.5–5.3)
POTASSIUM SERPL-SCNC: 4.4 MMOL/L — SIGNIFICANT CHANGE UP (ref 3.5–5.3)
RBC # BLD: 3.78 M/UL — LOW (ref 4.2–5.8)
RBC # FLD: 14 % — SIGNIFICANT CHANGE UP (ref 10.3–16.9)
SODIUM SERPL-SCNC: 134 MMOL/L — LOW (ref 135–145)
SPECIMEN SOURCE: SIGNIFICANT CHANGE UP
WBC # BLD: 6.8 K/UL — SIGNIFICANT CHANGE UP (ref 3.8–10.5)
WBC # FLD AUTO: 6.8 K/UL — SIGNIFICANT CHANGE UP (ref 3.8–10.5)

## 2017-08-08 PROCEDURE — 93010 ELECTROCARDIOGRAM REPORT: CPT

## 2017-08-08 PROCEDURE — 99233 SBSQ HOSP IP/OBS HIGH 50: CPT

## 2017-08-08 PROCEDURE — 99233 SBSQ HOSP IP/OBS HIGH 50: CPT | Mod: GC

## 2017-08-08 RX ORDER — LOSARTAN POTASSIUM 100 MG/1
25 TABLET, FILM COATED ORAL ONCE
Qty: 0 | Refills: 0 | Status: COMPLETED | OUTPATIENT
Start: 2017-08-08 | End: 2017-08-08

## 2017-08-08 RX ORDER — LOSARTAN POTASSIUM 100 MG/1
75 TABLET, FILM COATED ORAL DAILY
Qty: 0 | Refills: 0 | Status: DISCONTINUED | OUTPATIENT
Start: 2017-08-09 | End: 2017-08-10

## 2017-08-08 RX ORDER — ONDANSETRON 8 MG/1
4 TABLET, FILM COATED ORAL ONCE
Qty: 0 | Refills: 0 | Status: COMPLETED | OUTPATIENT
Start: 2017-08-08 | End: 2017-08-08

## 2017-08-08 RX ORDER — MAGNESIUM SULFATE 500 MG/ML
1 VIAL (ML) INJECTION ONCE
Qty: 0 | Refills: 0 | Status: COMPLETED | OUTPATIENT
Start: 2017-08-08 | End: 2017-08-08

## 2017-08-08 RX ORDER — LOSARTAN POTASSIUM 100 MG/1
50 TABLET, FILM COATED ORAL ONCE
Qty: 0 | Refills: 0 | Status: DISCONTINUED | OUTPATIENT
Start: 2017-08-08 | End: 2017-08-08

## 2017-08-08 RX ORDER — LIPASE/PROTEASE/AMYLASE 16-48-48K
3 CAPSULE,DELAYED RELEASE (ENTERIC COATED) ORAL
Qty: 0 | Refills: 0 | Status: DISCONTINUED | OUTPATIENT
Start: 2017-08-08 | End: 2017-08-10

## 2017-08-08 RX ORDER — FAMOTIDINE 10 MG/ML
20 INJECTION INTRAVENOUS
Qty: 0 | Refills: 0 | Status: DISCONTINUED | OUTPATIENT
Start: 2017-08-08 | End: 2017-08-10

## 2017-08-08 RX ORDER — POLYETHYLENE GLYCOL 3350 17 G/17G
17 POWDER, FOR SOLUTION ORAL THREE TIMES A DAY
Qty: 0 | Refills: 0 | Status: DISCONTINUED | OUTPATIENT
Start: 2017-08-08 | End: 2017-08-10

## 2017-08-08 RX ADMIN — INSULIN GLARGINE 10 UNIT(S): 100 INJECTION, SOLUTION SUBCUTANEOUS at 22:25

## 2017-08-08 RX ADMIN — QUETIAPINE FUMARATE 50 MILLIGRAM(S): 200 TABLET, FILM COATED ORAL at 22:25

## 2017-08-08 RX ADMIN — METHADONE HYDROCHLORIDE 50 MILLIGRAM(S): 40 TABLET ORAL at 11:08

## 2017-08-08 RX ADMIN — Medication 100 MILLIGRAM(S): at 13:02

## 2017-08-08 RX ADMIN — QUETIAPINE FUMARATE 25 MILLIGRAM(S): 200 TABLET, FILM COATED ORAL at 09:04

## 2017-08-08 RX ADMIN — Medication 0.5 MILLIGRAM(S): at 16:16

## 2017-08-08 RX ADMIN — Medication 100 MILLIGRAM(S): at 11:08

## 2017-08-08 RX ADMIN — HEPARIN SODIUM 5000 UNIT(S): 5000 INJECTION INTRAVENOUS; SUBCUTANEOUS at 05:49

## 2017-08-08 RX ADMIN — Medication 3 MILLILITER(S): at 00:53

## 2017-08-08 RX ADMIN — POLYETHYLENE GLYCOL 3350 17 GRAM(S): 17 POWDER, FOR SOLUTION ORAL at 05:48

## 2017-08-08 RX ADMIN — LOSARTAN POTASSIUM 25 MILLIGRAM(S): 100 TABLET, FILM COATED ORAL at 19:10

## 2017-08-08 RX ADMIN — Medication 2: at 13:01

## 2017-08-08 RX ADMIN — Medication 4: at 17:49

## 2017-08-08 RX ADMIN — Medication 100 MILLIGRAM(S): at 05:49

## 2017-08-08 RX ADMIN — LOSARTAN POTASSIUM 50 MILLIGRAM(S): 100 TABLET, FILM COATED ORAL at 05:49

## 2017-08-08 RX ADMIN — Medication 100 GRAM(S): at 09:03

## 2017-08-08 RX ADMIN — Medication 3 MILLIGRAM(S): at 00:53

## 2017-08-08 RX ADMIN — Medication 4 UNIT(S): at 13:01

## 2017-08-08 RX ADMIN — POLYETHYLENE GLYCOL 3350 17 GRAM(S): 17 POWDER, FOR SOLUTION ORAL at 22:25

## 2017-08-08 RX ADMIN — Medication 3 MILLILITER(S): at 11:08

## 2017-08-08 RX ADMIN — Medication 1 MILLIGRAM(S): at 11:08

## 2017-08-08 RX ADMIN — Medication 1 TABLET(S): at 05:49

## 2017-08-08 RX ADMIN — FAMOTIDINE 20 MILLIGRAM(S): 10 INJECTION INTRAVENOUS at 17:49

## 2017-08-08 RX ADMIN — Medication 4 UNIT(S): at 17:50

## 2017-08-08 RX ADMIN — Medication 4 UNIT(S): at 09:04

## 2017-08-08 RX ADMIN — Medication 1 TABLET(S): at 11:08

## 2017-08-08 RX ADMIN — Medication 3 MILLILITER(S): at 17:49

## 2017-08-08 RX ADMIN — Medication 3 CAPSULE(S): at 19:10

## 2017-08-08 RX ADMIN — Medication 100 MILLIGRAM(S): at 22:25

## 2017-08-08 RX ADMIN — HEPARIN SODIUM 5000 UNIT(S): 5000 INJECTION INTRAVENOUS; SUBCUTANEOUS at 22:24

## 2017-08-08 RX ADMIN — HEPARIN SODIUM 5000 UNIT(S): 5000 INJECTION INTRAVENOUS; SUBCUTANEOUS at 13:02

## 2017-08-08 RX ADMIN — Medication 3 MILLILITER(S): at 05:49

## 2017-08-08 RX ADMIN — SENNA PLUS 2 TABLET(S): 8.6 TABLET ORAL at 22:24

## 2017-08-08 RX ADMIN — Medication 2: at 22:24

## 2017-08-08 RX ADMIN — Medication 3 CAPSULE(S): at 15:18

## 2017-08-08 RX ADMIN — ESCITALOPRAM OXALATE 10 MILLIGRAM(S): 10 TABLET, FILM COATED ORAL at 11:08

## 2017-08-08 RX ADMIN — POLYETHYLENE GLYCOL 3350 17 GRAM(S): 17 POWDER, FOR SOLUTION ORAL at 13:02

## 2017-08-08 RX ADMIN — ONDANSETRON 4 MILLIGRAM(S): 8 TABLET, FILM COATED ORAL at 06:07

## 2017-08-08 RX ADMIN — Medication 1 TABLET(S): at 17:49

## 2017-08-08 NOTE — PROGRESS NOTE BEHAVIORAL HEALTH - NSBHCONSULTMEDS_PSY_A_CORE FT
1. Would trial Ativan 1 mg PO x1; if resolution of patient's nausea/tremulousness, would start Librium taper for presumed Xanax withdrawal  2. Continue Lexapro 10 mg PO daily  3. Continue methadone 50 mg PO daily  4. Continue Seroquel 25 mg PO qam and 50 mg PO HS

## 2017-08-08 NOTE — PROGRESS NOTE BEHAVIORAL HEALTH - SUMMARY
66M PPH heroin use disorder on methadone, one prior psychiatric admission for suicide attempt via OD following wife's death, on prescribed benzodiazepines by PMD, unclear prior diagnosis of dementia, presenting to [facility] after wandering from home, now being treated for presumed aspiration PNA.  Patient yesterday and today appearing alert indicating patient's presentation last week was largely in setting of overmedication on methadone and benzodiazepines, with potential overlying delirium 2/2 PNA.      Patient now reporting nausea and tremor along with intense anxiety.  Although nausea potentially 2/2 reported chronic pancreatitis and hypertensive episodes potentially 2/2 baseline hypertension, given patient's reported overuse of Xanax and risk of Xanax withdrawal would trial Ativan 1 mg PO x 1 to assess for resolution of symptomatology which would indicate benzodiazepine withdrawal as at least partial etiology of symptoms.  Would then start Librium taper with plan to discontinue completely as this patient should not be prescribed ongoing benzodiazepines in the future because he is unable to take them appropriately.

## 2017-08-08 NOTE — PROGRESS NOTE ADULT - PROBLEM SELECTOR PLAN 5
Patient with prior history of heroin abuse; on Methadone 220 mg since age 20 (verified with methadone clinic/patient's son). Dose reduced while inpatient to 150 -->100 -->50 mg (starting 8/4). Patient somnolent after dosing. Will continue to monitor patient for signs of withdrawal. No reported nausea/vomiting/diarrhea overnight. Patient noting epigastric fullness today which he notes has been present for the last few days. To be given lactulose today. Patient tremulous and notes feeling anxious.

## 2017-08-08 NOTE — PROGRESS NOTE ADULT - PROBLEM SELECTOR PLAN 1
Patient presented to [facility] with AMS s/p fall, agitation, paranoia. History of drug abuse (on methadone). Per patient's son, patient has been reported as a missing person for the last week. His behavior has become more erratic over the last two weeks and has been reported to have gone to multiple EDs over the past week seeking methadone. 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 Per PCP (Dr Orozco), patient history of severe depression after wife's death 2 years ago (suicide attempt last year). Managed at home with Ativan 2 mg TID PRN. Recommend Seroquel 50 mg qHS for sundowning, Haldol PO 2 mg q4h PRN (first, if agitated overnight) or Haldol IM 2 mg q4h PRN (2nd option for agitation). Seroquel 25 mg PO AM    -Patient less tremulous on exam today; no need to monitor CIWA score; c/w methadone 50 mg daily. Patient much more alert on this dose; continue to monitor for signs/symptoms of opioid withdrawal in light of recent Methadone dose reductions. Patient presented to [facility] with AMS s/p fall, agitation, paranoia. History of drug abuse (on methadone). Per patient's son, patient has been reported as a missing person for the last week. His behavior has become more erratic over the last two weeks and has been reported to have gone to multiple EDs over the past week seeking methadone. 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 Per PCP (Dr Orozco), patient history of severe depression after wife's death 2 years ago (suicide attempt last year). Managed at home with Ativan 2 mg TID PRN. Recommend Seroquel 50 mg qHS for sundowning, Haldol PO 2 mg q4h PRN (first, if agitated overnight) or Haldol IM 2 mg q4h PRN (2nd option for agitation). Seroquel 25 mg PO AM   -Patient still tremulous on exam today however no need to monitor CIWA score. Possibly 2/2 to benzo withdrawal per Psych. Given 0.5 mg Ativan x1 in afternoon to see if patient clinically improves with Benzo, if yes, likely 2/2 to benzo withdrawal; c/w methadone 50 mg daily. Patient much more alert on this dose; continue to monitor for signs/symptoms of opioid withdrawal in light of recent Methadone dose reductions.

## 2017-08-08 NOTE — PROGRESS NOTE ADULT - PROBLEM SELECTOR PLAN 2
Resolved. Patient developed severe sepsis 8/3 (afternoon), (Temp 103.1, Tachycardic 116, no leukocytosis, lactate 2.6) likely 2/2 to aspiration PNA. See Chart note for details.   -Blood cx -no growth to date, not bacteremic  -Lactate 2.6 -->downtrended to 1.6 s/p 1500cc fluid resuscitation  -CXR revealing a RLL infiltrate likely 2/2 to aspiration. Barium swallow --> aspiration risk. On aspiration precautions/dysphagia diet.  -Started on Zosyn 8/4 (x2 days) --> switched to Unasyn on 8/5 as unlikely patient requires pseudomonal coverage. Switched antibiotics to Augmentin 875 mg BID 8/6 as patient tolerating PO intake.

## 2017-08-08 NOTE — PROGRESS NOTE BEHAVIORAL HEALTH - NSBHFUPINTERVALHXFT_PSY_A_CORE
Patient reports he is "dealing with psychiatric side effects" of not getting home doses of methadone and Xanax.  Reports intense anxiety.  Had episode of nausea this morning.  Was hypertensive overnight.  Reports sweating although this was not appreciated on exam.  Still constipated.  Noted tongue fasciculations.  Patient unable to engage in further interview; perseverated regarding needing more medication.    Psychology intern Brock Fajardo attempted MOCA with patient; patient performed poorly in executive functioning and attention and declined to complete MOCA.

## 2017-08-08 NOTE — PROGRESS NOTE ADULT - ATTENDING COMMENTS
Patient was seen and examined by me at bedside. I agree with resident's note, subjective, objective physical exam, assessment and plan with following modifications/additions.     Patient seen on 8/8/17 at 930 am
Pt seen and examined at bedside,   agree with above.   Pt continue to reporting anxiety, improved from yesterday.   Tremors improved, no e/o asterixis on exam today.   65 yo M with hx of HCV cirrhosis, methadone use, presenting with AMS, with course complicated by aspiration PNA, now with improved mental status, good respiratory status.   1. PNA - can continue augmentin to complete 7 day course  2.  DM -  continue to monitor FSG, dose adjust insulin.   3.  AMS - conitnue seroquel for delirium, paranoia, agitation.  continue methadone, haldol PRN  4.  FEN - continue diabetic diet, monitor electrolytes, hypomagnesemia today repleted  5.  PPx - HSQ for dvt ppx  6. Dispo - likely for JONEL, medically ready
Pt seen and examined, VSS, exam as above, labs reviewed.  Agree with plan as above.  Decreased methadone to 50 mg as pt very lethargic at times and pt's son doesn't allow pt to take his total home dose of meds per RN and HS.  This should keep pt out of withdrwal.  Titrate up seroquel as needed and haldol IM switched to seroquel 25 mg prn as needed for agitation.  C/w zosyn and descalate to augmentin this weekend if continues to improve.  Appreciate S+S eval- for dysphagia diet.  For JONEL on tues likely.

## 2017-08-08 NOTE — PROGRESS NOTE ADULT - ASSESSMENT
65 y/o male PMHx of prior heroin abuse (on Methadone), Hepatitis C (dx date UNK, untreated), recently diagnosed liver cirrhosis, CKD (stage 3), mild dementia presenting on 8/1 as a transfer for [facility] ED with history of recent fall and altered mentation, agitation, and paranoia. CT head negative for intracranial bleed. AMS likely multifactorial in etiology; likely 2/2 to pseudodementia 2/2 to severe depression vs toxic metabolic encephalopathy of unclear etiology. Patient became septic 8/3 likely secondary to aspiration pneumonia (CXR revealing RLL infiltrate) as patient somnolent on prior methadone regimen, now s/p dose reduction to 50 mg starting 8/4. Currently treating for aspiration PNA, initially with Zosyn x2 days, switched to Unasyn on 8/5; patient tolerating PO intake and switched to Augmentin 875 mg BID starting 8/6/17.

## 2017-08-08 NOTE — PROGRESS NOTE ADULT - PROBLEM SELECTOR PLAN 7
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 Son notes decline the 2 weeks prior to admission.   -Managed by PCP with Ativan 2 mg TID PRN. Will hold Ativan. Patient still tremulous/anxious however no associated reported nausea/vomiting/diarrhea. No need to monitor CIWA at this time.  -Psych following; Appreciate recs: c/w Seroquel 50 mg qHS for sundowning, Seroquel 25 mg PO in AM (standing - 10 am); Haldol 2mg PO PRN or Haldol 2 mg IM PRN for agitation; added Lexapro 10 mg daily today Patient with reported history of dementia per patient's son, likely pseudodementia 2/2 to severe depression. Son notes decline the 2 weeks prior to admission.   -Managed by PCP with Ativan 2 mg TID PRN. Will hold Ativan. Patient still tremulous/anxious however no associated reported nausea/vomiting/diarrhea. No need to monitor CIWA at this time.  -Psych following; Appreciate recs: c/w Seroquel 50 mg qHS for sundowning, Seroquel 25 mg PO in AM (standing - 10 am); Haldol 2mg PO PRN or Haldol 2 mg IM PRN for agitation; Lexapro 10 mg daily

## 2017-08-08 NOTE — PROGRESS NOTE ADULT - SUBJECTIVE AND OBJECTIVE BOX
OVERNIGHT EVENTS: No overnight events.    SUBJECTIVE / INTERVAL HPI: Patient seen and examined at bedside. Patient reports feeling nauseous overnight and spiting up sputum during the night. He still continues to report this sensation of epigastric fullness which he reports as quite bothersome. Patient still feeling anxious and tremulous. Patient reports having a small BM yesterday. Remainder of ROS negative.    VITAL SIGNS:  Vital Signs Last 24 Hrs  T(C): 36.8 (08 Aug 2017 05:47), Max: 37.3 (07 Aug 2017 12:58)  T(F): 98.2 (08 Aug 2017 05:47), Max: 99.1 (07 Aug 2017 12:58)  HR: 91 (08 Aug 2017 05:47) (91 - 105)  BP: 134/82 (08 Aug 2017 05:47) (134/82 - 184/77)  BP(mean): --  RR: 16 (08 Aug 2017 05:47) (16 - 18)  SpO2: 95% (08 Aug 2017 05:47) (95% - 97%)    PHYSICAL EXAM:    General: WDWN  HEENT: NC/AT; PERRL, anicteric sclera; MMM  Neck: supple  Cardiovascular: +S1/S2; RRR  Respiratory: CTA B/L; no W/R/R  Gastrointestinal: soft, NT/ND; +BSx4  Extremities: WWP; no edema, clubbing or cyanosis  Vascular: 2+ radial, DP/PT pulses B/L  Neurological: AAOx3; no focal deficits    MEDICATIONS:  MEDICATIONS  (STANDING):  thiamine 100 milliGRAM(s) Oral three times a day  folic acid 1 milliGRAM(s) Oral daily  multivitamin 1 Tablet(s) Oral daily  heparin  Injectable 5000 Unit(s) SubCutaneous every 8 hours  QUEtiapine 50 milliGRAM(s) Oral at bedtime  insulin lispro (HumaLOG) corrective regimen sliding scale   SubCutaneous Before meals and at bedtime  dextrose 5%. 1000 milliLiter(s) (50 mL/Hr) IV Continuous <Continuous>  dextrose 50% Injectable 12.5 Gram(s) IV Push once  dextrose 50% Injectable 25 Gram(s) IV Push once  dextrose 50% Injectable 25 Gram(s) IV Push once  ALBUTerol/ipratropium for Nebulization 3 milliLiter(s) Nebulizer every 6 hours  docusate sodium 100 milliGRAM(s) Oral daily  senna 2 Tablet(s) Oral at bedtime  methadone    Tablet 50 milliGRAM(s) Oral daily  insulin glargine Injectable (LANTUS) 10 Unit(s) SubCutaneous at bedtime  insulin lispro Injectable (HumaLOG) 4 Unit(s) SubCutaneous three times a day before meals  amoxicillin  875 milliGRAM(s)/clavulanate 1 Tablet(s) Oral two times a day  losartan 50 milliGRAM(s) Oral daily  QUEtiapine 25 milliGRAM(s) Oral daily  escitalopram 10 milliGRAM(s) Oral daily  polyethylene glycol 3350 17 Gram(s) Oral three times a day  amylase/lipase/protease  (CREON 12,000 Units) 3 Capsule(s) Oral three times a day with meals  famotidine    Tablet 20 milliGRAM(s) Oral two times a day    MEDICATIONS  (PRN):  dextrose Gel 1 Dose(s) Oral once PRN Blood Glucose LESS THAN 70 milliGRAM(s)/deciliter  glucagon  Injectable 1 milliGRAM(s) IntraMuscular once PRN Glucose LESS THAN 70 milligrams/deciliter  acetaminophen    Suspension 650 milliGRAM(s) Oral every 6 hours PRN For Temp greater than 38 C (100.4 F)  melatonin 3 milliGRAM(s) Oral at bedtime PRN Insomnia      ALLERGIES:  Allergies    No Known Allergies    Intolerances        LABS:                        11.8   6.8   )-----------( 269      ( 08 Aug 2017 07:36 )             34.2     08-08    134<L>  |  97  |  19  ----------------------------<  155<H>  4.4   |  22  |  1.10    Ca    9.2      08 Aug 2017 07:36  Mg     1.9     08-08          CAPILLARY BLOOD GLUCOSE  150 (08 Aug 2017 08:09)          RADIOLOGY & ADDITIONAL TESTS: Reviewed.    ASSESSMENT:    PLAN: OVERNIGHT EVENTS: No overnight events.    SUBJECTIVE / INTERVAL HPI: Patient seen and examined at bedside. Patient reports feeling nauseous overnight and spiting up sputum during the night. He still continues to report this sensation of epigastric fullness which he reports as quite bothersome. Patient still feeling anxious and tremulous. Patient reports having a small BM yesterday. Remainder of ROS negative.    VITAL SIGNS:  Vital Signs Last 24 Hrs  T(C): 36.8 (08 Aug 2017 05:47), Max: 37.3 (07 Aug 2017 12:58)  T(F): 98.2 (08 Aug 2017 05:47), Max: 99.1 (07 Aug 2017 12:58)  HR: 91 (08 Aug 2017 05:47) (91 - 105)  BP: 134/82 (08 Aug 2017 05:47) (134/82 - 184/77)  BP(mean): --  RR: 16 (08 Aug 2017 05:47) (16 - 18)  SpO2: 95% (08 Aug 2017 05:47) (95% - 97%)    PHYSICAL EXAM:    General: thin, frail appearing gentleman, tremulous, appears anxious, sitting up in bed  HEENT: NC/AT; PERRL, anicteric sclera; MMM; no oral sores; slightly erythematous posterior pharynx  Neck: supple  Lymph: no cervical or supraclavicular LAD  Cardiovascular: +S1/S2; regular, tachycardic, no m/r/g appreciated on auscultation  Respiratory: course rales at R lung base; remainder of lung exam clear to auscultation, good inspiratory effort  Gastrointestinal: soft, NT/ND; hypoactive BS  Extremities: no edema, clubbing or cyanosis  Vascular: 2+ radial, DP/PT pulses B/L  Neurological: AAOx3; no focal deficits    MEDICATIONS:  MEDICATIONS  (STANDING):  thiamine 100 milliGRAM(s) Oral three times a day  folic acid 1 milliGRAM(s) Oral daily  multivitamin 1 Tablet(s) Oral daily  heparin  Injectable 5000 Unit(s) SubCutaneous every 8 hours  QUEtiapine 50 milliGRAM(s) Oral at bedtime  insulin lispro (HumaLOG) corrective regimen sliding scale   SubCutaneous Before meals and at bedtime  dextrose 5%. 1000 milliLiter(s) (50 mL/Hr) IV Continuous <Continuous>  dextrose 50% Injectable 12.5 Gram(s) IV Push once  dextrose 50% Injectable 25 Gram(s) IV Push once  dextrose 50% Injectable 25 Gram(s) IV Push once  ALBUTerol/ipratropium for Nebulization 3 milliLiter(s) Nebulizer every 6 hours  docusate sodium 100 milliGRAM(s) Oral daily  senna 2 Tablet(s) Oral at bedtime  methadone    Tablet 50 milliGRAM(s) Oral daily  insulin glargine Injectable (LANTUS) 10 Unit(s) SubCutaneous at bedtime  insulin lispro Injectable (HumaLOG) 4 Unit(s) SubCutaneous three times a day before meals  amoxicillin  875 milliGRAM(s)/clavulanate 1 Tablet(s) Oral two times a day  losartan 50 milliGRAM(s) Oral daily  QUEtiapine 25 milliGRAM(s) Oral daily  escitalopram 10 milliGRAM(s) Oral daily  polyethylene glycol 3350 17 Gram(s) Oral three times a day  amylase/lipase/protease  (CREON 12,000 Units) 3 Capsule(s) Oral three times a day with meals  famotidine    Tablet 20 milliGRAM(s) Oral two times a day    MEDICATIONS  (PRN):  dextrose Gel 1 Dose(s) Oral once PRN Blood Glucose LESS THAN 70 milliGRAM(s)/deciliter  glucagon  Injectable 1 milliGRAM(s) IntraMuscular once PRN Glucose LESS THAN 70 milligrams/deciliter  acetaminophen    Suspension 650 milliGRAM(s) Oral every 6 hours PRN For Temp greater than 38 C (100.4 F)  melatonin 3 milliGRAM(s) Oral at bedtime PRN Insomnia      ALLERGIES:  Allergies    No Known Allergies    Intolerances        LABS:                        11.8   6.8   )-----------( 269      ( 08 Aug 2017 07:36 )             34.2     08-08    134<L>  |  97  |  19  ----------------------------<  155<H>  4.4   |  22  |  1.10    Ca    9.2      08 Aug 2017 07:36  Mg     1.9     08-08          CAPILLARY BLOOD GLUCOSE  150 (08 Aug 2017 08:09)          RADIOLOGY & ADDITIONAL TESTS: Reviewed.    ASSESSMENT:    PLAN: Hospital Course: 67yo male PMHx chronic methadone use (Per Methadone clinic: 220 mg home dose) in the setting of previous heroin abuse, Hep C (context of diagnosis unknown, untreated), recently diagnosed liver cirrhosis, CKD, Type II DM, HTN, Severe depression (1 prior suicide attempt 2016), and mild dementia who presented as a transfer from [facility] ED on 8/1 with history of a fall and altered mentation, agitation, and paranoia. HD stable on admission. CT Head r/o-ed intracranial hemorrhage. Patient tried eloping 2x while inpatient on 8/1. Psych following while inpatient, other than depression, AMS unlikely secondary to another underlying psychiatric issue, likely secondary to toxic metabolic encephalopathy. Patient frequently agitated towards beginning of admission. Given Seroquel 50 mg qHS and Seroquel 25 mg AM, which has eased his agitation. Patient started on 150 mg of Methadone, dose reduced to 50 mg as patient somnolent on 150 mg --> 100 mg doses. Currently on 50 mg Methadone daily. Patient became septic on 8/3, febrile (103 F, rectally), tachycardic to 110s, desaturated to 90s on RA. Sepsis workup done, Bcx revealed no growth to date, CXR revealed RLL infiltrate c/w with possible aspiration PNA. Started on Zosyn. Given Zosyn x 2 days-->de-escalated to Unasyn on 8/5-->switched to PO Augmentin starting 8/6. Seen by speech and swallow during this admission. The inconsistencies in his evals (patient intermittently somnolent vs alert during evals) prompted Barium swallow. Barium swallow found patient to be an aspiration risk, patient switched to dysphagia diet. Currently awaiting JONEL placement.    OVERNIGHT EVENTS: No overnight events.    SUBJECTIVE / INTERVAL HPI: Patient seen and examined at bedside. Patient reports feeling nauseous overnight and spiting up sputum during the night. He still continues to report this sensation of epigastric fullness which he reports as quite bothersome. Patient still feeling anxious and tremulous. Patient reports having a small BM yesterday. Remainder of ROS negative.    VITAL SIGNS:  Vital Signs Last 24 Hrs  T(C): 36.8 (08 Aug 2017 05:47), Max: 37.3 (07 Aug 2017 12:58)  T(F): 98.2 (08 Aug 2017 05:47), Max: 99.1 (07 Aug 2017 12:58)  HR: 91 (08 Aug 2017 05:47) (91 - 105)  BP: 134/82 (08 Aug 2017 05:47) (134/82 - 184/77)  BP(mean): --  RR: 16 (08 Aug 2017 05:47) (16 - 18)  SpO2: 95% (08 Aug 2017 05:47) (95% - 97%)    PHYSICAL EXAM:    General: thin, frail appearing gentleman, tremulous, appears anxious, sitting up in bed  HEENT: NC/AT; PERRL, anicteric sclera; MMM; no oral sores; slightly erythematous posterior pharynx  Neck: supple  Lymph: no cervical or supraclavicular LAD  Cardiovascular: +S1/S2; regular, tachycardic, no m/r/g appreciated on auscultation  Respiratory: course rales at R lung base; remainder of lung exam clear to auscultation, good inspiratory effort  Gastrointestinal: soft, NT/ND; hypoactive BS  Extremities: no edema, clubbing or cyanosis  Vascular: 2+ radial, DP/PT pulses B/L  Neurological: AAOx3; no focal deficits    MEDICATIONS:  MEDICATIONS  (STANDING):  thiamine 100 milliGRAM(s) Oral three times a day  folic acid 1 milliGRAM(s) Oral daily  multivitamin 1 Tablet(s) Oral daily  heparin  Injectable 5000 Unit(s) SubCutaneous every 8 hours  QUEtiapine 50 milliGRAM(s) Oral at bedtime  insulin lispro (HumaLOG) corrective regimen sliding scale   SubCutaneous Before meals and at bedtime  dextrose 5%. 1000 milliLiter(s) (50 mL/Hr) IV Continuous <Continuous>  dextrose 50% Injectable 12.5 Gram(s) IV Push once  dextrose 50% Injectable 25 Gram(s) IV Push once  dextrose 50% Injectable 25 Gram(s) IV Push once  ALBUTerol/ipratropium for Nebulization 3 milliLiter(s) Nebulizer every 6 hours  docusate sodium 100 milliGRAM(s) Oral daily  senna 2 Tablet(s) Oral at bedtime  methadone    Tablet 50 milliGRAM(s) Oral daily  insulin glargine Injectable (LANTUS) 10 Unit(s) SubCutaneous at bedtime  insulin lispro Injectable (HumaLOG) 4 Unit(s) SubCutaneous three times a day before meals  amoxicillin  875 milliGRAM(s)/clavulanate 1 Tablet(s) Oral two times a day  losartan 50 milliGRAM(s) Oral daily  QUEtiapine 25 milliGRAM(s) Oral daily  escitalopram 10 milliGRAM(s) Oral daily  polyethylene glycol 3350 17 Gram(s) Oral three times a day  amylase/lipase/protease  (CREON 12,000 Units) 3 Capsule(s) Oral three times a day with meals  famotidine    Tablet 20 milliGRAM(s) Oral two times a day    MEDICATIONS  (PRN):  dextrose Gel 1 Dose(s) Oral once PRN Blood Glucose LESS THAN 70 milliGRAM(s)/deciliter  glucagon  Injectable 1 milliGRAM(s) IntraMuscular once PRN Glucose LESS THAN 70 milligrams/deciliter  acetaminophen    Suspension 650 milliGRAM(s) Oral every 6 hours PRN For Temp greater than 38 C (100.4 F)  melatonin 3 milliGRAM(s) Oral at bedtime PRN Insomnia      ALLERGIES:  Allergies    No Known Allergies    Intolerances        LABS:                        11.8   6.8   )-----------( 269      ( 08 Aug 2017 07:36 )             34.2     08-08    134<L>  |  97  |  19  ----------------------------<  155<H>  4.4   |  22  |  1.10    Ca    9.2      08 Aug 2017 07:36  Mg     1.9     08-08          CAPILLARY BLOOD GLUCOSE  150 (08 Aug 2017 08:09)          RADIOLOGY & ADDITIONAL TESTS: Reviewed.    ASSESSMENT:    PLAN:

## 2017-08-08 NOTE — CHART NOTE - NSCHARTNOTEFT_GEN_A_CORE
Admitting Diagnosis:   Patient is a 66y old  Male who presents with a chief complaint of dementia and agitation. Pt is an unreliable hisotrian.     PAST MEDICAL & SURGICAL HISTORY:  Methadone use  Cirrhosis of liver  CKD (chronic kidney disease)  Hepatitis C: Unknown context of diagnosis  No significant past surgical history      Current Nutrition Order: Mechanical Soft, Honey Consistency Fluids (per SLP recs), Consistent Carb Diet        PO Intake: Good (%) [   ]  Fair (50-75%) [   ] Poor (<25%) [ X]    GI Issues: Pt c/o fullness, decreased appetite. Pt noted to have a small BM yesterday.     Pain: No pain    Skin Integrity: back skin tear     Labs:   08-08    134<L>  |  97  |  19  ----------------------------<  155<H>  4.4   |  22  |  1.10    Ca    9.2      08 Aug 2017 07:36  Mg     1.9     08-08      CAPILLARY BLOOD GLUCOSE  191 (08 Aug 2017 12:01)  150 (08 Aug 2017 08:09)  138 (07 Aug 2017 20:45)  126 (07 Aug 2017 16:47)          Medications:  MEDICATIONS  (STANDING):  thiamine 100 milliGRAM(s) Oral three times a day  folic acid 1 milliGRAM(s) Oral daily  multivitamin 1 Tablet(s) Oral daily  heparin  Injectable 5000 Unit(s) SubCutaneous every 8 hours  QUEtiapine 50 milliGRAM(s) Oral at bedtime  insulin lispro (HumaLOG) corrective regimen sliding scale   SubCutaneous Before meals and at bedtime  dextrose 5%. 1000 milliLiter(s) (50 mL/Hr) IV Continuous <Continuous>  dextrose 50% Injectable 12.5 Gram(s) IV Push once  dextrose 50% Injectable 25 Gram(s) IV Push once  dextrose 50% Injectable 25 Gram(s) IV Push once  ALBUTerol/ipratropium for Nebulization 3 milliLiter(s) Nebulizer every 6 hours  docusate sodium 100 milliGRAM(s) Oral daily  senna 2 Tablet(s) Oral at bedtime  methadone    Tablet 50 milliGRAM(s) Oral daily  insulin glargine Injectable (LANTUS) 10 Unit(s) SubCutaneous at bedtime  insulin lispro Injectable (HumaLOG) 4 Unit(s) SubCutaneous three times a day before meals  amoxicillin  875 milliGRAM(s)/clavulanate 1 Tablet(s) Oral two times a day  losartan 50 milliGRAM(s) Oral daily  QUEtiapine 25 milliGRAM(s) Oral daily  escitalopram 10 milliGRAM(s) Oral daily  polyethylene glycol 3350 17 Gram(s) Oral three times a day  amylase/lipase/protease  (CREON 12,000 Units) 3 Capsule(s) Oral three times a day with meals  famotidine    Tablet 20 milliGRAM(s) Oral two times a day    MEDICATIONS  (PRN):  dextrose Gel 1 Dose(s) Oral once PRN Blood Glucose LESS THAN 70 milliGRAM(s)/deciliter  glucagon  Injectable 1 milliGRAM(s) IntraMuscular once PRN Glucose LESS THAN 70 milligrams/deciliter  acetaminophen    Suspension 650 milliGRAM(s) Oral every 6 hours PRN For Temp greater than 38 C (100.4 F)  melatonin 3 milliGRAM(s) Oral at bedtime PRN Insomnia      Weight: No new wt to assess since admission. Please update wt.     Weight Change: N/A     Estimated energy needs: 61.2kg used for EER: 25-30kcal/kg (1530-1836kcal), 1-1.2g/kg (61-73g), 30-35kml/kg (1836-2142ml).     Subjective: 65y/o M admitted s/p fall with AMS. Pt is an unreliable historian. Modified Barium swallow completed by SLP and rec mechanical soft diet with honey thick liquids and rec. 1:1 assistance with meals.  Pt reports poor appetite and intake 2/2 fullness; consuming 25-50%. Pt agreed to try oral nutrition supplements. Recommend diet liberalization with ONS (Ensure Pudding TID). MD diet order verification placed. MD to verify order.     Previous Nutrition Diagnosis: Predicted suboptimal nutrient intake RT mental status, swallowing difficulty AEB pt being agitated with BMI 19.4    Active [ X  ]  Resolved [   ]    If resolved, new PES:     Goal: 1.) Pt will meet 75% of needs PO     Recommendations: Rec. liberalizing diet and rec. Ensure Pudding TID. Consider appetite stimulant (Marinol)     Education: Encouraged adequate intake. Pt unreliable historian and some confusion noted at this time. Follow up would be beneficial.     Risk Level: High [ X  ] Moderate [   ] Low [   ] Admitting Diagnosis:   Patient is a 66y old  Male who presents with a chief complaint of dementia and agitation. Pt is an unreliable historian.     PAST MEDICAL & SURGICAL HISTORY:  Methadone use  Cirrhosis of liver  CKD (chronic kidney disease)  Hepatitis C: Unknown context of diagnosis  No significant past surgical history      Current Nutrition Order: Mechanical Soft, Honey Consistency Fluids (per SLP recs), Consistent Carb Diet        PO Intake: Good (%) [   ]  Fair (50-75%) [   ] Poor (<25%) [ X]    GI Issues: Pt c/o fullness, decreased appetite. Pt noted to have a small BM yesterday.     Pain: No pain    Skin Integrity: back skin tear     Labs:   08-08    134<L>  |  97  |  19  ----------------------------<  155<H>  4.4   |  22  |  1.10    Ca    9.2      08 Aug 2017 07:36  Mg     1.9     08-08      CAPILLARY BLOOD GLUCOSE  191 (08 Aug 2017 12:01)  150 (08 Aug 2017 08:09)  138 (07 Aug 2017 20:45)  126 (07 Aug 2017 16:47)          Medications:  MEDICATIONS  (STANDING):  thiamine 100 milliGRAM(s) Oral three times a day  folic acid 1 milliGRAM(s) Oral daily  multivitamin 1 Tablet(s) Oral daily  heparin  Injectable 5000 Unit(s) SubCutaneous every 8 hours  QUEtiapine 50 milliGRAM(s) Oral at bedtime  insulin lispro (HumaLOG) corrective regimen sliding scale   SubCutaneous Before meals and at bedtime  dextrose 5%. 1000 milliLiter(s) (50 mL/Hr) IV Continuous <Continuous>  dextrose 50% Injectable 12.5 Gram(s) IV Push   dextrose 50% Injectable 25 Gram(s) IV Push once  dextrose 50% Injectable 25 Gram(s) IV Push once  ALBUTerol/ipratropium for Nebulization 3 milliLiter(s) Nebulizer every 6 hours  docusate sodium 100 milliGRAM(s) Oral daily  senna 2 Tablet(s) Oral at bedtime  methadone    Tablet 50 milliGRAM(s) Oral daily  insulin glargine Injectable (LANTUS) 10 Unit(s) SubCutaneous at bedtime  insulin lispro Injectable (HumaLOG) 4 Unit(s) SubCutaneous three times a day before meals  amoxicillin  875 milliGRAM(s)/clavulanate 1 Tablet(s) Oral two times a day  losartan 50 milliGRAM(s) Oral daily  QUEtiapine 25 milliGRAM(s) Oral daily  escitalopram 10 milliGRAM(s) Oral daily  polyethylene glycol 3350 17 Gram(s) Oral three times a day  amylase/lipase/protease  (CREON 12,000 Units) 3 Capsule(s) Oral three times a day with meals  famotidine    Tablet 20 milliGRAM(s) Oral two times a day    MEDICATIONS  (PRN):  dextrose Gel 1 Dose(s) Oral once PRN Blood Glucose LESS THAN 70 milliGRAM(s)/deciliter  glucagon  Injectable 1 milliGRAM(s) IntraMuscular once PRN Glucose LESS THAN 70 milligrams/deciliter  acetaminophen    Suspension 650 milliGRAM(s) Oral every 6 hours PRN For Temp greater than 38 C (100.4 F)  melatonin 3 milliGRAM(s) Oral at bedtime PRN Insomnia      Weight: No new wt to assess since admission. Please update wt.     Weight Change: N/A     Estimated energy needs: 61.2kg used for EER: 25-30kcal/kg (1530-1836kcal), 1-1.2g/kg (61-73g), 30-35kml/kg (1836-2142ml).     Subjective: 65y/o M admitted s/p fall with AMS. Pt is an unreliable historian. Modified Barium swallow completed by SLP and rec mechanical soft diet with honey thick liquids and rec. 1:1 assistance with meals.  Pt reports poor appetite and intake 2/2 fullness; consuming 25-50%. Pt agreed to try oral nutrition supplements. Recommend diet liberalization with ONS (Ensure Pudding TID). MD diet order verification placed. MD to verify order.     Previous Nutrition Diagnosis: Predicted suboptimal nutrient intake RT mental status, swallowing difficulty AEB pt being agitated with BMI 19.4    Active [ X  ]  Resolved [   ]    If resolved, new PES:     Goal: 1.) Pt will meet 75% of needs PO     Recommendations: Rec. liberalizing diet and rec. Ensure Pudding TID. Consider appetite stimulant    Education: Encouraged adequate intake. Pt unreliable historian and some confusion noted at this time. Follow up would be beneficial.     Risk Level: High [ X  ] Moderate [   ] Low [   ]

## 2017-08-08 NOTE — PROGRESS NOTE ADULT - PROBLEM SELECTOR PLAN 8
Patient presenting with Creatinine 1.25, GFR 44 on admission (CKD stage 3); unknown baseline.   -Creatinine 1.1 today; continue to trend.

## 2017-08-09 DIAGNOSIS — E87.1 HYPO-OSMOLALITY AND HYPONATREMIA: ICD-10-CM

## 2017-08-09 LAB
ANION GAP SERPL CALC-SCNC: 13 MMOL/L — SIGNIFICANT CHANGE UP (ref 5–17)
BUN SERPL-MCNC: 22 MG/DL — SIGNIFICANT CHANGE UP (ref 7–23)
CALCIUM SERPL-MCNC: 9.4 MG/DL — SIGNIFICANT CHANGE UP (ref 8.4–10.5)
CHLORIDE SERPL-SCNC: 93 MMOL/L — LOW (ref 96–108)
CO2 SERPL-SCNC: 24 MMOL/L — SIGNIFICANT CHANGE UP (ref 22–31)
CREAT SERPL-MCNC: 1.1 MG/DL — SIGNIFICANT CHANGE UP (ref 0.5–1.3)
GLUCOSE SERPL-MCNC: 139 MG/DL — HIGH (ref 70–99)
HCT VFR BLD CALC: 34.1 % — LOW (ref 39–50)
HGB BLD-MCNC: 11.8 G/DL — LOW (ref 13–17)
MAGNESIUM SERPL-MCNC: 2 MG/DL — SIGNIFICANT CHANGE UP (ref 1.6–2.6)
MCHC RBC-ENTMCNC: 31.5 PG — SIGNIFICANT CHANGE UP (ref 27–34)
MCHC RBC-ENTMCNC: 34.6 G/DL — SIGNIFICANT CHANGE UP (ref 32–36)
MCV RBC AUTO: 90.9 FL — SIGNIFICANT CHANGE UP (ref 80–100)
PLATELET # BLD AUTO: 240 K/UL — SIGNIFICANT CHANGE UP (ref 150–400)
POTASSIUM SERPL-MCNC: 4.2 MMOL/L — SIGNIFICANT CHANGE UP (ref 3.5–5.3)
POTASSIUM SERPL-SCNC: 4.2 MMOL/L — SIGNIFICANT CHANGE UP (ref 3.5–5.3)
RBC # BLD: 3.75 M/UL — LOW (ref 4.2–5.8)
RBC # FLD: 14 % — SIGNIFICANT CHANGE UP (ref 10.3–16.9)
SODIUM SERPL-SCNC: 130 MMOL/L — LOW (ref 135–145)
WBC # BLD: 6.8 K/UL — SIGNIFICANT CHANGE UP (ref 3.8–10.5)
WBC # FLD AUTO: 6.8 K/UL — SIGNIFICANT CHANGE UP (ref 3.8–10.5)

## 2017-08-09 PROCEDURE — 99233 SBSQ HOSP IP/OBS HIGH 50: CPT

## 2017-08-09 RX ORDER — LIPASE/PROTEASE/AMYLASE 16-48-48K
3 CAPSULE,DELAYED RELEASE (ENTERIC COATED) ORAL
Qty: 0 | Refills: 0 | COMMUNITY
Start: 2017-08-09

## 2017-08-09 RX ORDER — POLYETHYLENE GLYCOL 3350 17 G/17G
17 POWDER, FOR SOLUTION ORAL
Qty: 30 | Refills: 0 | OUTPATIENT
Start: 2017-08-09

## 2017-08-09 RX ORDER — THIAMINE MONONITRATE (VIT B1) 100 MG
1 TABLET ORAL
Qty: 30 | Refills: 0 | OUTPATIENT
Start: 2017-08-09

## 2017-08-09 RX ORDER — SODIUM CHLORIDE 9 MG/ML
1000 INJECTION INTRAMUSCULAR; INTRAVENOUS; SUBCUTANEOUS
Qty: 0 | Refills: 0 | Status: DISCONTINUED | OUTPATIENT
Start: 2017-08-09 | End: 2017-08-10

## 2017-08-09 RX ORDER — ESCITALOPRAM OXALATE 10 MG/1
1 TABLET, FILM COATED ORAL
Qty: 30 | Refills: 0 | OUTPATIENT
Start: 2017-08-09 | End: 2017-09-08

## 2017-08-09 RX ORDER — CLONAZEPAM 1 MG
0.5 TABLET ORAL EVERY 8 HOURS
Qty: 0 | Refills: 0 | Status: DISCONTINUED | OUTPATIENT
Start: 2017-08-09 | End: 2017-08-10

## 2017-08-09 RX ORDER — METHADONE HYDROCHLORIDE 40 MG/1
220 TABLET ORAL
Qty: 0 | Refills: 0 | COMMUNITY

## 2017-08-09 RX ORDER — QUETIAPINE FUMARATE 200 MG/1
1 TABLET, FILM COATED ORAL
Qty: 30 | Refills: 0 | OUTPATIENT
Start: 2017-08-09 | End: 2017-09-08

## 2017-08-09 RX ORDER — LOSARTAN POTASSIUM 100 MG/1
1 TABLET, FILM COATED ORAL
Qty: 0 | Refills: 0 | COMMUNITY
Start: 2017-08-09

## 2017-08-09 RX ORDER — FOLIC ACID 0.8 MG
1 TABLET ORAL
Qty: 30 | Refills: 0 | OUTPATIENT
Start: 2017-08-09

## 2017-08-09 RX ORDER — METHADONE HYDROCHLORIDE 40 MG/1
50 TABLET ORAL
Qty: 0 | Refills: 0 | COMMUNITY
Start: 2017-08-09

## 2017-08-09 RX ADMIN — Medication 1 TABLET(S): at 05:48

## 2017-08-09 RX ADMIN — Medication 1 MILLIGRAM(S): at 11:21

## 2017-08-09 RX ADMIN — Medication 3 MILLILITER(S): at 00:50

## 2017-08-09 RX ADMIN — Medication 3 MILLILITER(S): at 05:47

## 2017-08-09 RX ADMIN — LOSARTAN POTASSIUM 75 MILLIGRAM(S): 100 TABLET, FILM COATED ORAL at 05:48

## 2017-08-09 RX ADMIN — Medication 1 TABLET(S): at 11:22

## 2017-08-09 RX ADMIN — Medication 4: at 17:07

## 2017-08-09 RX ADMIN — Medication 100 MILLIGRAM(S): at 11:20

## 2017-08-09 RX ADMIN — SENNA PLUS 2 TABLET(S): 8.6 TABLET ORAL at 21:17

## 2017-08-09 RX ADMIN — FAMOTIDINE 20 MILLIGRAM(S): 10 INJECTION INTRAVENOUS at 17:12

## 2017-08-09 RX ADMIN — POLYETHYLENE GLYCOL 3350 17 GRAM(S): 17 POWDER, FOR SOLUTION ORAL at 21:17

## 2017-08-09 RX ADMIN — Medication 4 UNIT(S): at 06:49

## 2017-08-09 RX ADMIN — INSULIN GLARGINE 10 UNIT(S): 100 INJECTION, SOLUTION SUBCUTANEOUS at 21:43

## 2017-08-09 RX ADMIN — QUETIAPINE FUMARATE 25 MILLIGRAM(S): 200 TABLET, FILM COATED ORAL at 09:24

## 2017-08-09 RX ADMIN — Medication 100 MILLIGRAM(S): at 05:47

## 2017-08-09 RX ADMIN — ESCITALOPRAM OXALATE 10 MILLIGRAM(S): 10 TABLET, FILM COATED ORAL at 11:20

## 2017-08-09 RX ADMIN — SODIUM CHLORIDE 100 MILLILITER(S): 9 INJECTION INTRAMUSCULAR; INTRAVENOUS; SUBCUTANEOUS at 10:17

## 2017-08-09 RX ADMIN — Medication 1 TABLET(S): at 17:12

## 2017-08-09 RX ADMIN — METHADONE HYDROCHLORIDE 50 MILLIGRAM(S): 40 TABLET ORAL at 11:20

## 2017-08-09 RX ADMIN — QUETIAPINE FUMARATE 50 MILLIGRAM(S): 200 TABLET, FILM COATED ORAL at 21:17

## 2017-08-09 RX ADMIN — FAMOTIDINE 20 MILLIGRAM(S): 10 INJECTION INTRAVENOUS at 05:48

## 2017-08-09 RX ADMIN — Medication 3 MILLILITER(S): at 11:23

## 2017-08-09 RX ADMIN — Medication 100 MILLIGRAM(S): at 21:16

## 2017-08-09 RX ADMIN — POLYETHYLENE GLYCOL 3350 17 GRAM(S): 17 POWDER, FOR SOLUTION ORAL at 05:48

## 2017-08-09 RX ADMIN — Medication 3 CAPSULE(S): at 17:11

## 2017-08-09 RX ADMIN — HEPARIN SODIUM 5000 UNIT(S): 5000 INJECTION INTRAVENOUS; SUBCUTANEOUS at 05:48

## 2017-08-09 RX ADMIN — HEPARIN SODIUM 5000 UNIT(S): 5000 INJECTION INTRAVENOUS; SUBCUTANEOUS at 21:17

## 2017-08-09 RX ADMIN — Medication 3 CAPSULE(S): at 09:24

## 2017-08-09 RX ADMIN — Medication 4 UNIT(S): at 17:07

## 2017-08-09 RX ADMIN — Medication 2: at 09:24

## 2017-08-09 NOTE — PROGRESS NOTE BEHAVIORAL HEALTH - NSBHCONSULTOBSREASON_PSY_A_CORE FT
As per primary team; elopement risk in setting of dementia
as per primary team.
as per primary team
report of suicidality today

## 2017-08-09 NOTE — DISCHARGE NOTE ADULT - MEDICATION SUMMARY - MEDICATIONS TO CHANGE
I will SWITCH the dose or number of times a day I take the medications listed below when I get home from the hospital:    methadone 10 mg oral tablet  -- 220 milligram(s) by mouth once a day I will SWITCH the dose or number of times a day I take the medications listed below when I get home from the hospital:  None

## 2017-08-09 NOTE — DISCHARGE NOTE ADULT - CARE PLAN
Principal Discharge DX:	Altered mental status  Goal:	Return to baseline  Instructions for follow-up, activity and diet:	You were admitted for confusion and agitation. A CAT scan of your head was done which showed no bleeding in the brain. You medication doses were adjusted because you were too somnolent on the current regiment. Methadone was decreased to 50 mg per day. You were started on  Seroquel 50 mg at bedtime and 25 mg in the morning which improved your agitation symptoms.  Secondary Diagnosis:	Aspiration pneumonia of right lung  Goal:	Completion of antibiotics course  Instructions for follow-up, activity and diet:	You were found to have a pneumonia as a result of aspiration. You were given antibiotics and will finish the course upon your discharge.  Secondary Diagnosis:	Depression  Goal:	Resolution of mood symptoms  Instructions for follow-up, activity and diet:	You were seen by psychiatry for your reported history of depression. Recommend Seroquel 50 mg qHS for sundowning, Seroquel 25 mg daily, Lexapro 10 mg daily,  Secondary Diagnosis:	Hepatitis C  Secondary Diagnosis:	Diabetes mellitus  Secondary Diagnosis:	CKD (chronic kidney disease) Principal Discharge DX:	Altered mental status  Goal:	Return to baseline  Instructions for follow-up, activity and diet:	You were admitted for confusion and agitation. A CT scan of your head was done which showed no bleeding in the brain. You medication doses were adjusted because you were too somnolent on the current regiment. Methadone was decreased to 50 mg per day. You were started on  Seroquel 50 mg at bedtime and 25 mg in the morning which improved your agitation symptoms. You will continue with Seroquel 50 mg at bedtime and stop your home dose of Clonopine  Secondary Diagnosis:	Aspiration pneumonia of right lung  Goal:	Completion of antibiotics course  Instructions for follow-up, activity and diet:	You were found to have a pneumonia as a result of aspiration. Chest X ray showed right lower lung infiltrate consistent with aspiration pneumonia. You were given antibiotics and will finish the course upon your discharge.  Secondary Diagnosis:	Depression  Goal:	Resolution of mood symptoms  Instructions for follow-up, activity and diet:	You were seen by psychiatry for your reported history of depression. You were started on Seroquel 50 mg qHS for sundowning, Seroquel 25 mg daily, Lexapro 10 mg daily. You will continue with seroquel 50 mg at bedtime and Lexapro 10 mg daily  Secondary Diagnosis:	Hepatitis C  Goal:	Management of cirrhosis  Instructions for follow-up, activity and diet:	You came in with a known diagnosis of hepatitis C. Ultrasound of your abdomen showed  Secondary Diagnosis:	Diabetes mellitus  Secondary Diagnosis:	CKD (chronic kidney disease) Principal Discharge DX:	Altered mental status  Goal:	Return to baseline  Instructions for follow-up, activity and diet:	You were admitted for confusion and agitation. A CT scan of your head was done which showed no bleeding in the brain. You medication doses were adjusted because you were too somnolent on the current regiment. Methadone was decreased to 50 mg per day. You were started on  Seroquel 50 mg at bedtime and 25 mg in the morning which improved your agitation symptoms. You will continue with Seroquel 50 mg at bedtime and stop your home dose of Clonopine  Secondary Diagnosis:	Aspiration pneumonia of right lung  Goal:	Completion of antibiotics course  Instructions for follow-up, activity and diet:	You were found to have a pneumonia as a result of aspiration. Chest X ray showed right lower lung infiltrate consistent with aspiration pneumonia. You were given antibiotics and will finish the course upon your discharge.  Secondary Diagnosis:	Depression  Goal:	Resolution of mood symptoms  Instructions for follow-up, activity and diet:	You were seen by psychiatry for your reported history of depression. You were started on Seroquel 50 mg qHS for sundowning, Seroquel 25 mg daily, Lexapro 10 mg daily. You will continue with seroquel 50 mg at bedtime and Lexapro 10 mg daily  Secondary Diagnosis:	Hepatitis C  Goal:	Management of cirrhosis  Instructions for follow-up, activity and diet:	You came in with a known diagnosis of hepatitis C. You have no known history of esophageal varices and no ascites was. US Abdomen performed revealed mildly nodular morphology consistent with cirrhosis.  Secondary Diagnosis:	Diabetes mellitus  Goal:	Glucose 140-180  Secondary Diagnosis:	CKD (chronic kidney disease) Principal Discharge DX:	Altered mental status  Goal:	Return to baseline  Instructions for follow-up, activity and diet:	You were admitted for confusion and agitation. A CT scan of your head was done which showed no bleeding in the brain. You medication doses were adjusted because you were too somnolent on the current regiment. Methadone was decreased to 50 mg per day. You were started on  Seroquel 50 mg at bedtime and 25 mg in the morning which improved your agitation symptoms. You will continue with Seroquel 50 mg at bedtime and stop your home dose of Clonopine  Secondary Diagnosis:	Aspiration pneumonia of right lung  Goal:	Completion of antibiotics course  Instructions for follow-up, activity and diet:	You were found to have a pneumonia as a result of aspiration. Chest X ray showed right lower lung infiltrate consistent with aspiration pneumonia. You were given antibiotics and will finish the course upon your discharge.  Secondary Diagnosis:	Depression  Goal:	Resolution of mood symptoms  Instructions for follow-up, activity and diet:	You were seen by psychiatry for your reported history of depression. You were started on Seroquel 50 mg qHS for sundowning, Seroquel 25 mg daily, Lexapro 10 mg daily. You will continue with seroquel 50 mg at bedtime and Lexapro 10 mg daily  Secondary Diagnosis:	Hepatitis C  Goal:	Management of cirrhosis  Instructions for follow-up, activity and diet:	You came in with a known diagnosis of hepatitis C. You have no known history of esophageal varices and no ascites was. US Abdomen performed revealed mildly nodular morphology consistent with cirrhosis.  Secondary Diagnosis:	Diabetes mellitus  Goal:	Glucose 140-180  Instructions for follow-up, activity and diet:	Your blood sugar was managed with insulin throughout your hospital stay. You will resume your home dose of Janumet and Levamir and follow up with your primary care physician for ongoing management of your blood sugar  Secondary Diagnosis:	CKD (chronic kidney disease)  Goal:	Stable kidney function  Instructions for follow-up, activity and diet:	You were found to have an elevated but stable creatinine on admission. You will follow up with your primary care physician for ongoing management  Secondary Diagnosis:	Hypertension  Goal:	Blood pressure <160/90  Instructions for follow-up, activity and diet:	Your blood pressure medication were temporarily stopped in the hospital. You will restart your home dose of clonodine, metropolol, cozar and pletal and follow up your primary care physician for ongoing management of your blood sugar Principal Discharge DX:	Altered mental status  Goal:	Return to baseline  Instructions for follow-up, activity and diet:	You were admitted for confusion and agitation. A CT scan of your head was done which showed no bleeding in the brain. You medication doses were adjusted because you were too somnolent on the current regiment. Methadone was decreased to 50 mg per day. You were started on  Seroquel 50 mg at bedtime and 25 mg in the morning which improved your agitation symptoms. You will continue with Seroquel 50 mg at bedtime and stop your home dose of Clonopine  Secondary Diagnosis:	Aspiration pneumonia of right lung  Goal:	Completion of antibiotics course  Instructions for follow-up, activity and diet:	You were found to have a pneumonia as a result of aspiration. Chest X ray showed right lower lung infiltrate consistent with aspiration pneumonia. You were given antibiotics and finished a 7 day course. No additional antibiotics is needed  Secondary Diagnosis:	Depression  Goal:	Resolution of mood symptoms  Instructions for follow-up, activity and diet:	You were seen by psychiatry for your reported history of depression. You were started on Seroquel 50 mg qHS for sundowning, Seroquel 25 mg daily, Lexapro 10 mg daily. You will continue with seroquel 50 mg at bedtime and Lexapro 10 mg daily  Secondary Diagnosis:	Hepatitis C  Goal:	Management of cirrhosis  Instructions for follow-up, activity and diet:	You came in with a known diagnosis of hepatitis C. You have no known history of esophageal varices and no ascites was. US Abdomen performed revealed mildly nodular morphology consistent with cirrhosis.  Secondary Diagnosis:	Diabetes mellitus  Goal:	Glucose 140-180  Instructions for follow-up, activity and diet:	Your blood sugar was managed with insulin throughout your hospital stay. You will resume your home dose of Janumet and Levamir and follow up with your primary care physician for ongoing management of your blood sugar.  Secondary Diagnosis:	CKD (chronic kidney disease)  Goal:	Stable kidney function  Instructions for follow-up, activity and diet:	You were found to have an elevated but stable creatinine on admission. You will follow up with your primary care physician for ongoing management  Secondary Diagnosis:	Hypertension  Goal:	Blood pressure <160/90  Instructions for follow-up, activity and diet:	Your blood pressure medication were temporarily stopped in the hospital. Your blood pressure was managed by Losartan 75 mg daily, which you will continue until you follow up with your primary care doctor. You will restart your home dose of clonodine, metropolol, cozar and pletal after you follow up your primary care physician for ongoing management of your blood sugar Principal Discharge DX:	Altered mental status  Goal:	Return to baseline  Instructions for follow-up, activity and diet:	You were admitted for confusion and agitation. A CT scan of your head was done which showed no bleeding in the brain. You medication doses were adjusted because you were too somnolent on the current regiment. Methadone was decreased to 50 mg per day. You were started on  Seroquel 50 mg at bedtime and 25 mg in the morning which improved your agitation symptoms. You will continue with Seroquel 50 mg at bedtime and stop your home dose of Clonopine  Secondary Diagnosis:	Aspiration pneumonia of right lung  Goal:	Completion of antibiotics course  Instructions for follow-up, activity and diet:	You were found to have a pneumonia as a result of aspiration. Chest X ray showed right lower lung infiltrate consistent with aspiration pneumonia. You were given antibiotics and finished a 7 day course. No additional antibiotics is needed  Secondary Diagnosis:	Depression  Goal:	Resolution of mood symptoms  Instructions for follow-up, activity and diet:	You were seen by psychiatry for your reported history of depression. You were started on Seroquel 50 mg qHS for sundowning, Seroquel 25 mg daily, Lexapro 10 mg daily. You will continue with Seroquel 50 mg at bedtime and Lexapro 10 mg daily  Secondary Diagnosis:	Hepatitis C  Goal:	Management of cirrhosis  Instructions for follow-up, activity and diet:	You came in with a known diagnosis of hepatitis C. You have no known history of esophageal varices and no ascites was. US Abdomen performed revealed mildly nodular morphology consistent with cirrhosis.  Secondary Diagnosis:	Diabetes mellitus  Goal:	Glucose 140-180  Instructions for follow-up, activity and diet:	Your blood sugar was managed with insulin throughout your hospital stay. You will resume your home dose of Janumet and Levamir and follow up with your primary care physician for ongoing management of your blood sugar.  Secondary Diagnosis:	CKD (chronic kidney disease)  Goal:	Stable kidney function  Instructions for follow-up, activity and diet:	You were found to have an elevated but stable creatinine on admission. You will follow up with your primary care physician for ongoing management  Secondary Diagnosis:	Hypertension  Goal:	Blood pressure <160/90  Instructions for follow-up, activity and diet:	Your blood pressure medication were temporarily stopped in the hospital. Your blood pressure was managed by Losartan 75 mg daily, which you will continue until you follow up with your primary care doctor. You will restart your home dose of clonodine, metropolol, cozar and pletal after you follow up your primary care physician for ongoing management of your blood sugar Principal Discharge DX:	Altered mental status  Goal:	Return to baseline  Instructions for follow-up, activity and diet:	You were admitted for confusion and agitation. A CT scan of your head was done which showed no bleeding in the brain. You medication doses were adjusted because you were too somnolent on the current regiment. Methadone was decreased to 50 mg per day. You were started on  Seroquel 50 mg at bedtime and 25 mg in the morning which improved your agitation symptoms. You will continue with Seroquel 50 mg at bedtime and stop your home dose of Klonopin  Secondary Diagnosis:	Aspiration pneumonia of right lung  Goal:	Completion of antibiotics course  Instructions for follow-up, activity and diet:	You were found to have a pneumonia as a result of aspiration. Chest X ray showed right lower lung infiltrate consistent with aspiration pneumonia. You were given antibiotics and finished a 7 day course. No additional antibiotics is needed  Secondary Diagnosis:	Depression  Goal:	Resolution of mood symptoms  Instructions for follow-up, activity and diet:	You were seen by psychiatry for your reported history of depression. You were started on Seroquel 50 mg qHS for sundowning, Seroquel 25 mg daily, Lexapro 10 mg daily. You will continue with Seroquel 50 mg at bedtime and Lexapro 10 mg daily  Secondary Diagnosis:	Hepatitis C  Goal:	Management of cirrhosis  Instructions for follow-up, activity and diet:	You came in with a known diagnosis of hepatitis C. You have no known history of esophageal varices and no ascites was. US Abdomen performed revealed mildly nodular morphology consistent with cirrhosis.  Secondary Diagnosis:	Diabetes mellitus  Goal:	Glucose 140-180  Instructions for follow-up, activity and diet:	Your blood sugar was managed with insulin throughout your hospital stay. You will resume your home dose of Janumet and Levamir and follow up with your primary care physician for ongoing management of your blood sugar.  Secondary Diagnosis:	CKD (chronic kidney disease)  Goal:	Stable kidney function  Instructions for follow-up, activity and diet:	You were found to have an elevated but stable creatinine on admission. You will follow up with your primary care physician for ongoing management  Secondary Diagnosis:	Hypertension  Goal:	Blood pressure <160/90  Instructions for follow-up, activity and diet:	Your blood pressure medication were temporarily stopped in the hospital. Your blood pressure was managed by Losartan 75 mg daily, which you will continue until you follow up with your primary care doctor. You will restart your home dose of clonodine, metropolol, cozar and pletal after you follow up your primary care physician for ongoing management of your blood sugar Principal Discharge DX:	Altered mental status  Goal:	Return to baseline  Instructions for follow-up, activity and diet:	You were admitted for confusion and agitation. A CT scan of your head was done which showed no bleeding in the brain. You medication doses were adjusted because you were too somnolent on the current regiment. Methadone was decreased to 50 mg per day. You were started on  Seroquel 50 mg at bedtime and 25 mg in the morning which improved your agitation symptoms. You will continue with Seroquel 50 mg at bedtime and stop your home dose of Klonopin  Secondary Diagnosis:	Aspiration pneumonia of right lung  Goal:	Completion of antibiotics course  Instructions for follow-up, activity and diet:	You were found to have a pneumonia as a result of aspiration. Chest X ray showed right lower lung infiltrate consistent with aspiration pneumonia. You were given antibiotics and finished a 7 day course. No additional antibiotics is needed  Secondary Diagnosis:	Depression  Goal:	Resolution of mood symptoms  Instructions for follow-up, activity and diet:	You were seen by psychiatry for your reported history of depression. You were started on Seroquel 50 mg qHS for sundowning, Seroquel 25 mg daily, Lexapro 10 mg daily. You will continue with Seroquel 50 mg at bedtime and Lexapro 10 mg daily  Secondary Diagnosis:	Hepatitis C  Goal:	Management of cirrhosis  Instructions for follow-up, activity and diet:	You came in with a known diagnosis of hepatitis C. You have no known history of esophageal varices and no ascites was. US Abdomen performed revealed mildly nodular morphology consistent with cirrhosis.  Secondary Diagnosis:	Diabetes mellitus  Goal:	Glucose 140-180  Instructions for follow-up, activity and diet:	Your blood sugar was managed with insulin throughout your hospital stay. You will continue with the current regiment of 13U lantus at bedtime, 4 U Humalog pre meal and insulin sliding scale. You will resume your home dose of Janumet and Levamir once you leave the hospital and follow up with your primary care physician for ongoing management of your blood sugar.  Secondary Diagnosis:	CKD (chronic kidney disease)  Goal:	Stable kidney function  Instructions for follow-up, activity and diet:	You were found to have an elevated but stable creatinine on admission. You will follow up with your primary care physician for ongoing management  Secondary Diagnosis:	Hypertension  Goal:	Blood pressure <160/90  Instructions for follow-up, activity and diet:	Your blood pressure medication were temporarily stopped in the hospital. Your blood pressure was managed by Losartan 75 mg daily, which you will continue until you follow up with your primary care doctor. You will continue with losartan 100 mg daily and metoprolol 50 mg twice a day. You will restart your home dose of etropolol, cozar and pletal after you follow up your primary care physician for ongoing management of your blood sugar Principal Discharge DX:	Toxic metabolic encephalopathy  Goal:	Return to baseline  Instructions for follow-up, activity and diet:	You were admitted for confusion and agitation. A CT scan of your head was done which showed no bleeding in the brain. You medication doses were adjusted because you were too somnolent on the current regiment. Methadone was decreased to 50 mg per day. You were started on  Seroquel 50 mg at bedtime and 25 mg in the morning which improved your agitation symptoms. You will continue with Seroquel 50 mg at bedtime and stop your home dose of Klonopin  Secondary Diagnosis:	Aspiration pneumonia of right lung  Goal:	Completion of antibiotics course  Instructions for follow-up, activity and diet:	You were found to have a pneumonia as a result of aspiration. Chest X ray showed right lower lung infiltrate consistent with aspiration pneumonia. You were given antibiotics and finished a 7 day course. No additional antibiotics is needed  Secondary Diagnosis:	Depression  Goal:	Resolution of mood symptoms  Instructions for follow-up, activity and diet:	You were seen by psychiatry for your reported history of depression. You were started on Seroquel 50 mg qHS for sundowning, Seroquel 25 mg daily, Lexapro 10 mg daily. You will continue with Seroquel 50 mg at bedtime and Lexapro 10 mg daily  Secondary Diagnosis:	Hepatitis C  Goal:	Management of cirrhosis  Instructions for follow-up, activity and diet:	You came in with a known diagnosis of hepatitis C. You have no known history of esophageal varices and no ascites was. US Abdomen performed revealed mildly nodular morphology consistent with cirrhosis.  Secondary Diagnosis:	Diabetes mellitus  Goal:	Glucose 140-180  Instructions for follow-up, activity and diet:	Your blood sugar was managed with insulin throughout your hospital stay. You will continue with the current regiment of 13U lantus at bedtime, 4 U Humalog pre meal and insulin sliding scale. You will resume your home dose of Janumet and Levamir once you leave the hospital and follow up with your primary care physician for ongoing management of your blood sugar.  Secondary Diagnosis:	Hypertension  Goal:	Stable kidney function  Instructions for follow-up, activity and diet:	You were found to have an elevated but stable creatinine on admission. You will follow up with your primary care physician for ongoing management  Secondary Diagnosis:	Sepsis  Goal:	Blood pressure <160/90  Instructions for follow-up, activity and diet:	Your blood pressure medication were temporarily stopped in the hospital. Your blood pressure was managed by Losartan 75 mg daily, which you will continue until you follow up with your primary care doctor. You will continue with losartan 100 mg daily and metoprolol 50 mg twice a day. You will restart your home dose of etropolol, cozar and pletal after you follow up your primary care physician for ongoing management of your blood sugar

## 2017-08-09 NOTE — DISCHARGE NOTE ADULT - OTHER SIGNIFICANT FINDINGS
CT Head: No acute intracranial hemorrhage or calvarial fracture. The previously perceived hyperdensity in the left frontal lobe is felt to be artifactual. CT Head: No acute intracranial hemorrhage or calvarial fracture. The previously perceived hyperdensity in the left frontal lobe is felt to be artifactual.  US abdomen: Slightly echogenic and coarsened hepatic parenchyma, which may be seen in hepatitis. Small amount of gallbladder sludge.

## 2017-08-09 NOTE — PROGRESS NOTE BEHAVIORAL HEALTH - SUMMARY
66M PPH heroin use disorder on methadone, one prior psychiatric admission for suicide attempt via OD following wife's death, on prescribed benzodiazepines by PMD, unclear prior diagnosis of dementia, presenting to [facility] after wandering from home, now being treated for presumed aspiration PNA.  Patient this week appearing alert indicating patient's presentation last week was largely in setting of overmedication on methadone and benzodiazepines, with potential overlying delirium 2/2 PNA.      Some concern yesterday for benzodiazepine withdrawal, although unusual timeline.  Patient appears improved today although BP elevated.  Would consider home clonidine as treatment for potential opiate withdrawal/hypertension. 66M PPH heroin use disorder on methadone, one prior psychiatric admission for suicide attempt via OD following wife's death, on prescribed benzodiazepines by PMD, unclear prior diagnosis of dementia, presenting to [facility] after wandering from home, now being treated for presumed aspiration PNA.  Patient this week appearing alert indicating patient's presentation last week was largely in setting of overmedication on methadone and benzodiazepines, with potential overlying delirium 2/2 PNA.      Some concern for benzodiazepine withdrawal, although unusual timeline.  Would consider home clonidine as treatment for potential opiate withdrawal/hypertension.  Would also start trial of Klonopin 0.5 mg PO q8h to address potential benzo withdrawal.  Will reassess in am.  Patient's dispo unclear given PT needs.

## 2017-08-09 NOTE — DISCHARGE NOTE ADULT - CARE PROVIDER_API CALL
BREANN,   [facility]  [address] [facility] [address]  Phone: (   )    -  Fax: (   )    -

## 2017-08-09 NOTE — DISCHARGE NOTE ADULT - PLAN OF CARE
Return to baseline You were admitted for confusion and agitation. A CAT scan of your head was done which showed no bleeding in the brain. You medication doses were adjusted because you were too somnolent on the current regiment. Methadone was decreased to 50 mg per day. You were started on  Seroquel 50 mg at bedtime and 25 mg in the morning which improved your agitation symptoms. You were found to have a pneumonia as a result of aspiration. You were given antibiotics and will finish the course upon your discharge. Completion of antibiotics course Resolution of mood symptoms You were seen by psychiatry for your reported history of depression. Recommend Seroquel 50 mg qHS for sundowning, Seroquel 25 mg daily, Lexapro 10 mg daily, Management of cirrhosis You came in with a known diagnosis of hepatitis C. Ultrasound of your abdomen showed You were admitted for confusion and agitation. A CT scan of your head was done which showed no bleeding in the brain. You medication doses were adjusted because you were too somnolent on the current regiment. Methadone was decreased to 50 mg per day. You were started on  Seroquel 50 mg at bedtime and 25 mg in the morning which improved your agitation symptoms. You will continue with Seroquel 50 mg at bedtime and stop your home dose of Clonopine You were found to have a pneumonia as a result of aspiration. Chest X ray showed right lower lung infiltrate consistent with aspiration pneumonia. You were given antibiotics and will finish the course upon your discharge. You were seen by psychiatry for your reported history of depression. You were started on Seroquel 50 mg qHS for sundowning, Seroquel 25 mg daily, Lexapro 10 mg daily. You will continue with seroquel 50 mg at bedtime and Lexapro 10 mg daily Glucose 140-180 You came in with a known diagnosis of hepatitis C. You have no known history of esophageal varices and no ascites was. US Abdomen performed revealed mildly nodular morphology consistent with cirrhosis. Your blood sugar was managed with insulin throughout your hospital stay. You will resume your home dose of Janumet and Levamir and follow up with your primary care physician for ongoing management of your blood sugar Stable kidney function You were found to have an elevated but stable creatinine on admission. You will follow up with your primary care physician for ongoing management Blood pressure <160/90 Your blood pressure medication were temporarily stopped in the hospital. You will restart your home dose of clonodine, metropolol, cozar and pletal and follow up your primary care physician for ongoing management of your blood sugar You were found to have a pneumonia as a result of aspiration. Chest X ray showed right lower lung infiltrate consistent with aspiration pneumonia. You were given antibiotics and finished a 7 day course. No additional antibiotics is needed Your blood sugar was managed with insulin throughout your hospital stay. You will resume your home dose of Janumet and Levamir and follow up with your primary care physician for ongoing management of your blood sugar. Your blood pressure medication were temporarily stopped in the hospital. Your blood pressure was managed by Losartan 75 mg daily, which you will continue until you follow up with your primary care doctor. You will restart your home dose of clonodine, metropolol, cozar and pletal after you follow up your primary care physician for ongoing management of your blood sugar You were seen by psychiatry for your reported history of depression. You were started on Seroquel 50 mg qHS for sundowning, Seroquel 25 mg daily, Lexapro 10 mg daily. You will continue with Seroquel 50 mg at bedtime and Lexapro 10 mg daily You were admitted for confusion and agitation. A CT scan of your head was done which showed no bleeding in the brain. You medication doses were adjusted because you were too somnolent on the current regiment. Methadone was decreased to 50 mg per day. You were started on  Seroquel 50 mg at bedtime and 25 mg in the morning which improved your agitation symptoms. You will continue with Seroquel 50 mg at bedtime and stop your home dose of Klonopin Your blood sugar was managed with insulin throughout your hospital stay. You will continue with the current regiment of 13U lantus at bedtime, 4 U Humalog pre meal and insulin sliding scale. You will resume your home dose of Janumet and Levamir once you leave the hospital and follow up with your primary care physician for ongoing management of your blood sugar. Your blood pressure medication were temporarily stopped in the hospital. Your blood pressure was managed by Losartan 75 mg daily, which you will continue until you follow up with your primary care doctor. You will continue with losartan 100 mg daily and metoprolol 50 mg twice a day. You will restart your home dose of etropolol, cozar and pletal after you follow up your primary care physician for ongoing management of your blood sugar

## 2017-08-09 NOTE — DISCHARGE NOTE ADULT - HOSPITAL COURSE
67yo male PMHx chronic methadone use (Per Methadone clinic: 220 mg home dose) in the setting of previous heroin abuse, Hep C (context of diagnosis unknown, untreated), chronic pancreatitis, recently diagnosed liver cirrhosis, CKD, Type II DM, HTN, Severe depression (1 prior suicide attempt 2016), and mild dementia who presented as a transfer from [facility] ED on 8/1 with history of a fall and altered mentation, agitation, and paranoia. HD stable on admission. CT Head r/o-ed intracranial hemorrhage. Patient tried eloping 2x while inpatient on 8/1. Psych following while inpatient, other than depression, AMS unlikely secondary to another underlying psychiatric issue, likely secondary to toxic metabolic encephalopathy. Patient frequently agitated towards beginning of admission. Given Seroquel 50 mg qHS and Seroquel 25 mg AM, which has eased his agitation. Patient started on 150 mg of Methadone, dose reduced to 50 mg as patient somnolent on 150 mg --> 100 mg doses. Currently on 50 mg Methadone daily. Patient became septic on 8/3, febrile (103 F, rectally), tachycardic to 110s, desaturated to 90s on RA. Sepsis workup done, Bcx revealed no growth to date, CXR revealed RLL infiltrate c/w with possible aspiration PNA. Started on Zosyn. Given Zosyn x 2 days-->de-escalated to Unasyn on 8/5-->switched to PO Augmentin starting 8/6. Seen by speech and swallow during this admission. The inconsistencies in his evals (patient intermittently somnolent vs alert during evals) prompted Barium swallow. Barium swallow found patient to be an aspiration risk, patient switched to dysphagia diet.    On the day of discharge, the patient was seen and examined. Symptoms improved. Vital signs are stable. Labs and imaging reviewed. Patient is medically optimized and hemodynamically stable. Return precautions discussed, medication teach back done, and importance of physician followup emphasized. The patient verbalized understanding. 65yo male PMHx chronic methadone use (Per Methadone clinic: 220 mg home dose) in the setting of previous heroin abuse, Hep C (context of diagnosis unknown, untreated), chronic pancreatitis, recently diagnosed liver cirrhosis, CKD, Type II DM, HTN, Severe depression (1 prior suicide attempt 2016), and mild dementia who presented as a transfer from [facility] ED on 8/1 with history of a fall and altered mentation, agitation, and paranoia. HD stable on admission. CT Head r/o-ed intracranial hemorrhage. Patient tried eloping 2x while inpatient on 8/1. Psych following while inpatient, other than depression, AMS unlikely secondary to another underlying psychiatric issue, likely secondary to toxic metabolic encephalopathy. Patient frequently agitated towards beginning of admission. Given Seroquel 50 mg qHS and Seroquel 25 mg AM, which has eased his agitation. Patient started on 150 mg of Methadone, dose reduced to 50 mg as patient somnolent on 150 mg --> 100 mg doses. Currently on 50 mg Methadone daily. Patient became septic on 8/3, febrile (103 F, rectally), tachycardic to 110s, desaturated to 90s on RA. Sepsis workup done, Bcx revealed no growth to date, CXR revealed RLL infiltrate c/w with possible aspiration PNA. Started on Zosyn. Given Zosyn x 2 days-->de-escalated to Unasyn on 8/5-->switched to PO Augmentin starting 8/6. Seen by speech and swallow during this admission. The inconsistencies in his evals (patient intermittently somnolent vs alert during evals) prompted Barium swallow. Barium swallow found patient to be an aspiration risk, patient switched to dysphagia diet. Pt was started on NS @100 cc/hr given development of hypoosmolar hypotonic hyponatremia 2/2 dehydration as pt I/Os were net negative for 24 hr.    On the day of discharge, the patient was seen and examined. Symptoms improved. Vital signs are stable. Labs and imaging reviewed. Patient is medically optimized and hemodynamically stable. Return precautions discussed, medication teach back done, and importance of physician followup emphasized. The patient verbalized understanding. 67yo male PMHx chronic methadone use (Per Methadone clinic: 220 mg home dose) in the setting of previous heroin abuse, Hep C (context of diagnosis unknown, untreated), chronic pancreatitis, recently diagnosed liver cirrhosis, CKD, Type II DM, HTN, Severe depression (1 prior suicide attempt 2016), and mild dementia who presented as a transfer from [facility] ED on 8/1 with history of a fall and altered mentation, agitation, and paranoia. HD stable on admission. CT Head r/o-ed intracranial hemorrhage. Patient tried eloping 2x while inpatient on 8/1. Psych following while inpatient, other than depression, AMS unlikely secondary to another underlying psychiatric issue, likely secondary to toxic metabolic encephalopathy. Patient frequently agitated towards beginning of admission. Given Seroquel 50 mg qHS and Seroquel 25 mg AM, which has eased his agitation. Patient started on 150 mg of Methadone, dose reduced to 50 mg as patient somnolent on 150 mg --> 100 mg doses. Currently on 50 mg Methadone daily. Patient became septic on 8/3, febrile (103 F, rectally), tachycardic to 110s, desaturated to 90s on RA. Sepsis workup done, Bcx revealed no growth to date, CXR revealed RLL infiltrate c/w with possible aspiration PNA. Started on Zosyn. Given Zosyn x 2 days-->de-escalated to Unasyn on 8/5-->switched to PO Augmentin starting 8/6. Seen by speech and swallow during this admission. The inconsistencies in his evals (patient intermittently somnolent vs alert during evals) prompted Barium swallow. Barium swallow found patient to be an aspiration risk, patient switched to dysphagia diet. Pt was started on NS @100 cc/hr given development of hypoosmolar hypotonic hyponatremia 2/2 dehydration as pt I/Os were net negative for 24 hr. Pt reporting active suicidal ideation with intents to jump out the window because he feels that he is not getting any better. Pt became suicidal stating that he feels that life is not worth living. Psychiatry called, saw patient and recommended a trial of clonopine 05 mg Q8 with no change in current Lexapro (10 mg daily) and Seroquel (25 mg AM, and 50 mg PM) regiment. Pt placed on constant observation. Elevated blood pressure with SBP in 180's to 190's managed by increasing losartan to home dose 100 mg daily. 5 mg IV push labetalol given. Plan to restart home dose of metoprolol 50 mg BID in the evening. Pt was medically optimized and transferred to psychiatry service.      On the day of discharge, the patient was seen and examined. Symptoms improved. Vital signs are stable. Labs and imaging reviewed. Patient is medically optimized and hemodynamically stable. Return precautions discussed, medication teach back done, and importance of physician followup emphasized. The patient verbalized understanding. 65yo male PMHx chronic methadone use (Per Methadone clinic: 220 mg home dose) in the setting of previous heroin abuse, Hep C (context of diagnosis unknown, untreated), chronic pancreatitis, recently diagnosed liver cirrhosis, CKD, Type II DM, HTN, Severe depression (1 prior suicide attempt 2016), and mild dementia who presented as a transfer from [facility] ED on 8/1 with history of a fall and altered mentation, agitation, and paranoia. HD stable on admission. CT Head r/o-ed intracranial hemorrhage. Patient tried eloping 2x while inpatient on 8/1. Psych following while inpatient, other than depression, AMS unlikely secondary to another underlying psychiatric issue, likely secondary to toxic metabolic encephalopathy. Patient frequently agitated towards beginning of admission. Given Seroquel 50 mg qHS and Seroquel 25 mg AM, which has eased his agitation. Patient started on 150 mg of Methadone, dose reduced to 50 mg as patient somnolent on 150 mg --> 100 mg doses. Currently on 50 mg Methadone daily. Patient became septic on 8/3, febrile (103 F, rectally), tachycardic to 110s, desaturated to 90s on RA. Sepsis workup done, Bcx revealed no growth to date, CXR revealed RLL infiltrate c/w with possible aspiration PNA. Started on Zosyn. Given Zosyn x 2 days-->de-escalated to Unasyn on 8/5-->switched to PO Augmentin starting 8/6 and stopped on 8/10. Seen by speech and swallow during this admission. The inconsistencies in his evals (patient intermittently somnolent vs alert during evals) prompted Barium swallow. Barium swallow found patient to be an aspiration risk, patient switched to dysphagia diet. Pt was started on NS @100 cc/hr given development of hypoosmolar hypotonic hyponatremia 2/2 dehydration as pt I/Os were net negative for 24 hr. Pt reporting active suicidal ideation with intents to jump out the window because he feels that he is not getting any better. Pt became suicidal stating that he feels that life is not worth living. Psychiatry called, saw patient and recommended a trial of clonopine 05 mg Q8 with no change in current Lexapro (10 mg daily) and Seroquel (25 mg AM, and 50 mg PM) regiment. Pt placed on constant observation. Elevated blood pressure with SBP in 180's to 190's managed by increasing losartan to home dose 100 mg daily. 5 mg IV push labetalol given. Plan to restart home dose of metoprolol 50 mg BID in the evening. Pt was medically optimized and transferred to psychiatry service.      On the day of discharge, the patient was seen and examined. Symptoms improved. Vital signs are stable. Labs and imaging reviewed. Patient is medically optimized and hemodynamically stable. Return precautions discussed, medication teach back done, and importance of physician followup emphasized. The patient verbalized understanding.

## 2017-08-09 NOTE — PROGRESS NOTE BEHAVIORAL HEALTH - NSBHCONSULTMEDS_PSY_A_CORE FT
1. Consider clonidine, as per primary team  2. Continue Lexapro 10 mg PO daily  3. Continue methadone 50 mg PO daily  4. Continue Seroquel 25 mg PO qam and 50 mg PO HS

## 2017-08-09 NOTE — PROGRESS NOTE ADULT - PROBLEM SELECTOR PLAN 3
Patient with recent diagnosis of cirrhosis. No known history of esophageal varices; no ascites noted on exam.  -US Abdomen performed revealed mildly nodular morphology c/w cirrhosis, slightly enlarged (16 cm), biliary sludge (no cholecystitis)  -Consider starting beta blockers for esophageal varices bleed ppx, ACE/ARB if significant ascites develops, rifaximin and lactulose if signs of metabolic encephalopathy (asterixis) Hypo osmolar hypovolemic hyponatremia. Pt I/Os net negative for past 24 hrs, calculated serum Osm (276), pt with hypochloremic hyponatremia, etiology likely 2/2 dehydration.  -Will start IVF NS @100 cc/hr

## 2017-08-09 NOTE — PROGRESS NOTE ADULT - SUBJECTIVE AND OBJECTIVE BOX
Patient is a 66y old  Male who presents with a chief complaint of Fall and altered mental status (09 Aug 2017 12:06)      INTERVAL HPI/OVERNIGHT EVENTS:    Review of Systems: 12 point review of systems otherwise negative  ( - )fevers/chills  ( - ) dyspnea  ( - ) cough  ( - ) chest pain  ( - ) palpatations  ( - ) dizziness/lightheadedness  ( - ) nausea/vomiting  ( - ) abd pain  ( - ) diarrhea  ( - ) melena  ( - ) hematochezia  ( - ) dysuria  ( - ) hematuria  ( - ) leg swelling  ( -) calf tenderness  ( - ) motor weakness  ( - ) extremity numbness  ( - ) back pain  ( + ) tolerating POs  ( + ) BM    MEDICATIONS  (STANDING):  thiamine 100 milliGRAM(s) Oral three times a day  folic acid 1 milliGRAM(s) Oral daily  multivitamin 1 Tablet(s) Oral daily  heparin  Injectable 5000 Unit(s) SubCutaneous every 8 hours  QUEtiapine 50 milliGRAM(s) Oral at bedtime  insulin lispro (HumaLOG) corrective regimen sliding scale   SubCutaneous Before meals and at bedtime  dextrose 5%. 1000 milliLiter(s) (50 mL/Hr) IV Continuous <Continuous>  dextrose 50% Injectable 12.5 Gram(s) IV Push once  dextrose 50% Injectable 25 Gram(s) IV Push once  dextrose 50% Injectable 25 Gram(s) IV Push once  ALBUTerol/ipratropium for Nebulization 3 milliLiter(s) Nebulizer every 6 hours  docusate sodium 100 milliGRAM(s) Oral daily  senna 2 Tablet(s) Oral at bedtime  methadone    Tablet 50 milliGRAM(s) Oral daily  insulin glargine Injectable (LANTUS) 10 Unit(s) SubCutaneous at bedtime  insulin lispro Injectable (HumaLOG) 4 Unit(s) SubCutaneous three times a day before meals  amoxicillin  875 milliGRAM(s)/clavulanate 1 Tablet(s) Oral two times a day  QUEtiapine 25 milliGRAM(s) Oral daily  escitalopram 10 milliGRAM(s) Oral daily  polyethylene glycol 3350 17 Gram(s) Oral three times a day  amylase/lipase/protease  (CREON 12,000 Units) 3 Capsule(s) Oral three times a day with meals  famotidine    Tablet 20 milliGRAM(s) Oral two times a day  losartan 75 milliGRAM(s) Oral daily  sodium chloride 0.9%. 1000 milliLiter(s) (100 mL/Hr) IV Continuous <Continuous>    MEDICATIONS  (PRN):  dextrose Gel 1 Dose(s) Oral once PRN Blood Glucose LESS THAN 70 milliGRAM(s)/deciliter  glucagon  Injectable 1 milliGRAM(s) IntraMuscular once PRN Glucose LESS THAN 70 milligrams/deciliter  acetaminophen    Suspension 650 milliGRAM(s) Oral every 6 hours PRN For Temp greater than 38 C (100.4 F)  melatonin 3 milliGRAM(s) Oral at bedtime PRN Insomnia      Allergies    No Known Allergies    Intolerances          Vital Signs Last 24 Hrs  T(C): 37 (09 Aug 2017 05:24), Max: 37 (09 Aug 2017 05:24)  T(F): 98.6 (09 Aug 2017 05:24), Max: 98.6 (09 Aug 2017 05:24)  HR: 89 (09 Aug 2017 05:24) (89 - 101)  BP: 163/77 (09 Aug 2017 05:24) (121/84 - 181/82)  BP(mean): --  RR: 14 (09 Aug 2017 05:24) (14 - 16)  SpO2: 95% (09 Aug 2017 05:24) (95% - 98%)  CAPILLARY BLOOD GLUCOSE  208 (09 Aug 2017 12:26)  154 (09 Aug 2017 07:57)  181 (08 Aug 2017 21:03)  228 (08 Aug 2017 17:05)          08-08 @ 07:01  -  08-09 @ 07:00  --------------------------------------------------------  IN: 0 mL / OUT: 750 mL / NET: -750 mL        Physical Exam:    Daily     Daily   General:  Well appearing, NAD, not cachetic  HEENT:  Nonicteric, PERRLA  CV:  RRR, no murmur, no JVD  Lungs:  CTA B/L, no wheezes, rales, rhonchi  Abdomen:  Soft, non-tender, no distended, positive BS, no hepatosplenomegaly  Extremities:  2+ pulses, no c/c, no edema  Skin:  Warm and dry, no rashes  :  No day  Neuro:  AAOx3, non-focal, CN II-XII grossly intact  No Restraints    LABS:                        11.8   6.8   )-----------( 240      ( 09 Aug 2017 07:31 )             34.1     08-09    130<L>  |  93<L>  |  22  ----------------------------<  139<H>  4.2   |  24  |  1.10    Ca    9.4      09 Aug 2017 07:31  Mg     2.0     08-09

## 2017-08-09 NOTE — PROGRESS NOTE ADULT - ASSESSMENT
67 y/o male PMHx of prior heroin abuse (on Methadone), Hepatitis C (dx date UNK, untreated), recently diagnosed liver cirrhosis, CKD (stage 3), mild dementia presenting on 8/1 as a transfer for [facility] ED with history of recent fall and altered mentation, agitation, and paranoia. CT head negative for intracranial bleed. AMS likely multifactorial in etiology; likely 2/2 to pseudodementia 2/2 to severe depression vs toxic metabolic encephalopathy of unclear etiology. Patient became septic 8/3 likely secondary to aspiration pneumonia (CXR revealing RLL infiltrate) as patient somnolent on prior methadone regimen, now s/p dose reduction to 50 mg starting 8/4. Currently treating for aspiration PNA, initially with Zosyn x2 days, switched to Unasyn on 8/5; patient tolerating PO intake and switched to Augmentin 875 mg BID starting 8/6/17. Today Hopsital day #9 67 y/o male PMHx of prior heroin abuse (on Methadone), Hepatitis C (dx date UNK, untreated), recently diagnosed liver cirrhosis, chronic pancreatitis, CKD (stage 3), mild dementia presenting on 8/1 as a transfer for [facility] ED with history of recent fall and altered mentation, agitation, and paranoia. CT head negative for intracranial bleed. AMS likely multifactorial in etiology; likely 2/2 to pseudodementia 2/2 to severe depression vs toxic metabolic encephalopathy of unclear etiology. Patient became septic 8/3 likely secondary to aspiration pneumonia (CXR revealing RLL infiltrate) as patient somnolent on prior methadone regimen, now s/p dose reduction to 50 mg starting 8/4. Currently treating for aspiration PNA, initially with Zosyn x2 days, switched to Unasyn on 8/5; patient tolerating PO intake and switched to Augmentin 875 mg BID starting 8/6/17. Today Hopsital day #9

## 2017-08-09 NOTE — PROGRESS NOTE ADULT - PROBLEM SELECTOR PLAN 5
Patient with prior history of heroin abuse; on Methadone 220 mg since age 20 (verified with methadone clinic/patient's son). Dose reduced while inpatient to 150 -->100 -->50 mg (starting 8/4). Patient somnolent after dosing. Will continue to monitor patient for signs of withdrawal. No reported nausea/vomiting/diarrhea overnight. Patient tremulous and notes feeling anxious. -HbA1c 6.7%.  -On ISS with basal (Lantus 10u qHS)/nutritional dose (Lispro 4 u pre meal)  -continue to monitor FS and adjust medications accordingly if continued need for corrective insulin.  Fingersticks 150,191,228,181,154. 24h insulin requirement 30U. Consider switching regiment to 15 U lantus bedtime, 5U premeal and ISS

## 2017-08-09 NOTE — PROGRESS NOTE ADULT - PROBLEM SELECTOR PLAN 4
-HbA1c 6.7%.  -On ISS with basal (Lantus 10u qHS)/nutritional dose (Lispro 4 u pre meal)  -continue to monitor FS and adjust medications accordingly if continued need for corrective insulin.  Fingersticks 150,191,228,181,154. 24h insulin requirement 30U. Consider switching regiment to 15 U lantus bedtime, 5U premeal and ISS Patient with recent diagnosis of cirrhosis. No known history of esophageal varices; no ascites noted on exam.  -US Abdomen performed revealed mildly nodular morphology c/w cirrhosis, slightly enlarged (16 cm), biliary sludge (no cholecystitis)  -Consider starting beta blockers for esophageal varices bleed ppx, ACE/ARB if significant ascites develops if BPs stable, rifaximin and lactulose if signs of metabolic encephalopathy (asterixis)

## 2017-08-09 NOTE — DISCHARGE NOTE ADULT - PATIENT PORTAL LINK FT
“You can access the Follow[employer] Patient Portal, offered by [facility], by registering with the following website: [email or web address]”

## 2017-08-09 NOTE — PROGRESS NOTE ADULT - PROBLEM SELECTOR PLAN 7
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 Son notes decline the 2 weeks prior to admission.   -Managed by PCP with Ativan 2 mg TID PRN. Will hold Ativan. Patient still tremulous/anxious however no associated reported nausea/vomiting/diarrhea. No need to monitor CIWA at this time.  -Psych following; Appreciate recs: c/w Seroquel 50 mg qHS for sundowning, Seroquel 25 mg PO in AM (standing - 10 am); Haldol 2mg PO PRN or Haldol 2 mg IM PRN for agitation; Lexapro 10 mg daily Patient with known history of hepatitis C likely 2/2 to heroin abuse (unknown diagnosis date, untreated).   -HCV - untreated

## 2017-08-09 NOTE — PROGRESS NOTE ADULT - PROBLEM SELECTOR PLAN 1
Patient presented to [facility] with AMS s/p fall, agitation, paranoia. History of drug abuse (on methadone). Per patient's son, patient has been reported as a missing person for the last week. His behavior has become more erratic over the last two weeks and has been reported to have gone to multiple EDs over the past week seeking methadone. 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 Per PCP (Dr Orozco), patient history of severe depression after wife's death 2 years ago (suicide attempt last year). Managed at home with Ativan 2 mg TID PRN. Recommend Seroquel 50 mg qHS for sundowning, Seroquel 25 mg daily, Lexapro 10 mg daily, Haldol PO 2 mg q4h PRN (first, if agitated overnight) or Haldol IM 2 mg q4h PRN (2nd option for agitation). Seroquel 25 mg PO AM   -Patient still tremulous on exam today however no need to monitor CIWA score. Possibly 2/2 to benzo withdrawal per Psych. Given 0.5 mg Ativan x1 in afternoon to see if patient clinically improves with Benzo, however no improvement noted this AM; c/w methadone 50 mg daily. Patient much more alert on this dose; continue to monitor for signs/symptoms of opioid withdrawal in light of recent Methadone dose reductions.

## 2017-08-09 NOTE — PROGRESS NOTE ADULT - PROBLEM SELECTOR PLAN 1
Etiology of encephalopathy multifactorial.  Patient was having intermittent  fevers along with underlying dementia/severe depression unclear if diagnosis confirmed and on high doses of methadone.  Currently mental status improving  -Continue treatment for aspiration PNA total course of 7 days  -Keep Euvolemic   -Replete electrolytes as needed  -Avoid sedative medications unless necessary

## 2017-08-09 NOTE — PROGRESS NOTE ADULT - PROBLEM SELECTOR PLAN 6
Patient with known history of hepatitis C likely 2/2 to heroin abuse (unknown diagnosis date, untreated).   -HCV - untreated Patient with prior history of heroin abuse; on Methadone 220 mg since age 20 (verified with methadone clinic/patient's son). Dose reduced while inpatient to 150 -->100 -->50 mg (starting 8/4). Patient somnolent after dosing. Will continue to monitor patient for signs of withdrawal. No reported nausea/vomiting/diarrhea overnight. Patient tremulous and notes feeling anxious.

## 2017-08-09 NOTE — PROGRESS NOTE ADULT - PROBLEM SELECTOR PROBLEM 10
Hyponatremia
Prophylactic measure

## 2017-08-09 NOTE — PROGRESS NOTE ADULT - PROBLEM SELECTOR PLAN 9
c/w dysphagia diet/aspiration precautions. Barium swallow suggest patient is an aspiration risk. Patient much more alert on 50 mg Methadone dose. Patient presenting with Creatinine 1.25, GFR 44 on admission (CKD stage 3); unknown baseline.   -Creatinine 1.1 and stable since admission; continue to trend.

## 2017-08-09 NOTE — DISCHARGE NOTE ADULT - MEDICATION SUMMARY - MEDICATIONS TO TAKE
I will START or STAY ON the medications listed below when I get home from the hospital:    methadone  -- 50 milligram(s) by mouth once a day  -- Indication: For Opiate dependance    losartan 100 mg oral tablet  -- 1 tab(s) by mouth once a day  -- Indication: For Hypertension    cloNIDine 0.1 mg/12 hr oral tablet, extended release  -- 1 tab(s) by mouth once a day (at bedtime)  -- Indication: For opiate dependence     clonazePAM 0.5 mg oral tablet  -- 1 tab(s) by mouth every 8 hours, As needed, agitation  -- Indication: For Anxiety    escitalopram 10 mg oral tablet  -- 1 tab(s) by mouth once a day -for depression  -- Indication: For Depression    Janumet 50 mg-1000 mg oral tablet  -- 1 tab(s) by mouth once a day  -- Indication: For Diabetes mellitus    QUEtiapine 50 mg oral tablet  -- 1 tab(s) by mouth once a day (at bedtime)  -- Check with your doctor before becoming pregnant.  Do not drink alcoholic beverages when taking this medication.  May cause drowsiness.  Alcohol may intensify this effect.  Use care when operating dangerous machinery.  Obtain medical advice before taking any non-prescription drugs as some may affect the action of this medication.  This drug may impair the ability to drive or operate machinery.  Use care until you become familiar with its effects.    -- Indication: For Depression    metoprolol tartrate 50 mg oral tablet  -- 1 tab(s) by mouth 2 times a day  -- Indication: For Hypertension    pancrelipase 12,000 units-38,000 units-60,000 units oral delayed release capsule  -- 3 cap(s) by mouth 3 times a day (with meals)  -- Indication: For Chronic pancreatitis    ClearLax oral powder for reconstitution  -- 17 gram(s) by mouth 2 times a day  -- Indication: For Constipation    Daily Multiple Vitamins oral tablet  -- 1 tab(s) by mouth once a day  -- Indication: For Nutrition, metabolism, and development symptoms    folic acid 1 mg oral tablet  -- 1 tab(s) by mouth once a day  -- Indication: For Nutrition, metabolism, and development symptoms    thiamine 100 mg oral tablet  -- 1 tab(s) by mouth once a day  -- Indication: For Nutrition, metabolism, and development symptoms I will START or STAY ON the medications listed below when I get home from the hospital:    methadone  -- 50 milligram(s) by mouth once a day  -- Indication: For Opiate dependance    losartan 100 mg oral tablet  -- 1 tab(s) by mouth once a day  -- Indication: For Hypertension    clonazePAM 0.5 mg oral tablet  -- 1 tab(s) by mouth every 8 hours, As needed, agitation  -- Indication: For Anxiety    escitalopram 10 mg oral tablet  -- 1 tab(s) by mouth once a day -for depression  -- Indication: For Depression    Lantus 100 units/mL subcutaneous solution  -- 13 unit(s) subcutaneous once a day (at bedtime)  -- Do not drink alcoholic beverages when taking this medication.  It is very important that you take or use this exactly as directed.  Do not skip doses or discontinue unless directed by your doctor.  Keep in refrigerator.  Do not freeze.    -- Indication: For Diabetes mellitus    HumaLOG 100 units/mL subcutaneous solution  -- 4 unit(s) subcutaneous 3 times a day (with meals)  -- Indication: For Diabetes mellitus    QUEtiapine 50 mg oral tablet  -- 1 tab(s) by mouth once a day (at bedtime)  -- Check with your doctor before becoming pregnant.  Do not drink alcoholic beverages when taking this medication.  May cause drowsiness.  Alcohol may intensify this effect.  Use care when operating dangerous machinery.  Obtain medical advice before taking any non-prescription drugs as some may affect the action of this medication.  This drug may impair the ability to drive or operate machinery.  Use care until you become familiar with its effects.    -- Indication: For Depression    metoprolol tartrate 50 mg oral tablet  -- 1 tab(s) by mouth 2 times a day  -- Indication: For Hypertension    pancrelipase 12,000 units-38,000 units-60,000 units oral delayed release capsule  -- 3 cap(s) by mouth 3 times a day (with meals)  -- Indication: For Chronic pancreatitis    ClearLax oral powder for reconstitution  -- 17 gram(s) by mouth 2 times a day  -- Indication: For Constipation    Multiple Vitamins oral tablet  -- 1 tab(s) by mouth once a day  -- Indication: For Nutrition, metabolism, and development symptoms    folic acid 1 mg oral tablet  -- 1 tab(s) by mouth once a day  -- Indication: For Nutrition, metabolism, and development symptoms    thiamine 100 mg oral tablet  -- 1 tab(s) by mouth once a day  -- Indication: For Nutrition, metabolism, and development symptoms I will START or STAY ON the medications listed below when I get home from the hospital:    methadone  -- 50 milligram(s) by mouth once a day  -- Indication: For Opiate dependance    losartan 100 mg oral tablet  -- 1 tab(s) by mouth once a day  -- Indication: For Hypertension    clonazePAM 0.5 mg oral tablet  -- 1 tab(s) by mouth every 8 hours, As needed, agitation  -- Indication: For Anxiety    escitalopram 10 mg oral tablet  -- 1 tab(s) by mouth once a day  -- Indication: For Depression    HumaLOG 100 units/mL subcutaneous solution  -- 4 unit(s) subcutaneous 3 times a day (with meals)  -- Indication: For Diabetes mellitus    Lantus 100 units/mL subcutaneous solution  -- 13 units/m2 subcutaneous once a day (at bedtime)  -- Indication: For Diabetes mellitus    QUEtiapine 50 mg oral tablet  -- 1 tab(s) by mouth once a day (at bedtime)  -- Indication: For Depression    QUEtiapine 25 mg oral tablet  -- 1 tab(s) by mouth once a day  -- Indication: For Depression    metoprolol tartrate 50 mg oral tablet  -- 1 tab(s) by mouth 2 times a day  -- Indication: For Hypertension    pancrelipase 12,000 units-38,000 units-60,000 units oral delayed release capsule  -- 3 cap(s) by mouth 3 times a day (with meals)  -- Indication: For Chronic pancreatitis    polyethylene glycol 3350 oral powder for reconstitution  -- 17 gram(s) by mouth 3 times a day  -- Indication: For Nutrition, metabolism, and development symptoms    Multiple Vitamins oral tablet  -- 1 tab(s) by mouth once a day  -- Indication: For Nutrition, metabolism, and development symptoms    thiamine 100 mg oral tablet  -- 1 tab(s) by mouth 3 times a day  -- Indication: For Nutrition, metabolism, and development symptoms    folic acid 1 mg oral tablet  -- 1 tab(s) by mouth once a day  -- Indication: For Nutrition, metabolism, and development symptoms

## 2017-08-09 NOTE — PROGRESS NOTE BEHAVIORAL HEALTH - NSBHCONSFOLLOWNEEDS_PSY_A_CORE
no psychiatric contraindications to discharge

## 2017-08-09 NOTE — PROGRESS NOTE BEHAVIORAL HEALTH - NSBHFUPINTERVALHXFT_PSY_A_CORE
Patient reports he slept well overnight.  Still reports anxiety and appears tremulous although improved compared to yesterday.  Reports low appetite and "phlegm" in his throat.  /82 overnight. Patient reports he slept well overnight.  Still reports anxiety and appears tremulous although improved compared to yesterday.  Reports low appetite and "phlegm" in his throat.  /82 overnight.    Update: this afternoon patient reported suicidality with plan to jump out window.  Reports intense anxiety although cannot be more specific.  Says he is not sure what would make it better.  Denies methadone relieves any of his anxiety.  Feels hopeless.  Continues with tremulousness; denies this is baseline.  Reports no periods of cessation of Xanax in last few years.

## 2017-08-09 NOTE — PROGRESS NOTE ADULT - PROBLEM SELECTOR PROBLEM 4
Type 2 diabetes mellitus with complication, unspecified long term insulin use status Cirrhosis of liver without ascites, unspecified hepatic cirrhosis type

## 2017-08-09 NOTE — PROGRESS NOTE ADULT - SUBJECTIVE AND OBJECTIVE BOX
INTERVAL HPI/OVERNIGHT EVENTS: No acute event overnight. Pt     ROS  CV: Denies chest pain, palpitations  RESP: Denies SOB  GI: Denies abdominal pain, constipation, diarrhea, nausea, vomiting  : Denies dysuria, hematuria, flank or back pain  ID: Denies fevers, chills  MSK: Denies joint pain   DERM: Denies any rashes, bruising, pruritis  ENDO: Denies heat or cold intolerances, changes in weight, thinning of hair  PSYCH: Denies any mood changes    VITAL SIGNS:  T(F): 98.6 (08-09-17 @ 05:24)  HR: 89 (08-09-17 @ 05:24)  BP: 163/77 (08-09-17 @ 05:24)  RR: 14 (08-09-17 @ 05:24)  SpO2: 95% (08-09-17 @ 05:24)  Wt(kg): --    PHYSICAL EXAM:    Constitutional: WDWN, NAD, resting comfortably   Head: NC/AT  Eyes: PERRL, EOMI, anicteric sclera, no mucosal pallor  ENT: no nasal discharge; uvula midline, no oropharyngeal erythema or exudates; MMM  Neck: supple; no JVD or thyromegaly, no lymphadenopathy  Respiratory: CTA B/L; no W/R/R, no retractions  Cardiac: +S1/S2; RRR; no M/R/G; PMI non-displaced  Gastrointestinal: abdomen soft, NT/ND; no rebound or guarding; +BSx4  Back: spine midline, no bony tenderness or step-offs; no CVAT B/L  Extremities: warm; no calf tenderness, no pedal edema, no cyanosis, no clubbing  Musculoskeletal: NROM x4; no joint swelling, tenderness or erythema  Vascular: 2+ radial, femoral; DP/PT pulses B/L  Dermatologic: no rash, no petechia   Lymphatic: no submandibular or cervical LAD  Neurologic: AAOx3; CNII-XII grossly intact; sensory symmetrically intact in B/L LE   Psychiatric: pleasant and conversive; affect and characteristics of appearance, verbalizations, behaviors are appropriate      MEDICATIONS  (STANDING):  thiamine 100 milliGRAM(s) Oral three times a day  folic acid 1 milliGRAM(s) Oral daily  multivitamin 1 Tablet(s) Oral daily  heparin  Injectable 5000 Unit(s) SubCutaneous every 8 hours  QUEtiapine 50 milliGRAM(s) Oral at bedtime  insulin lispro (HumaLOG) corrective regimen sliding scale   SubCutaneous Before meals and at bedtime  dextrose 5%. 1000 milliLiter(s) (50 mL/Hr) IV Continuous <Continuous>  dextrose 50% Injectable 12.5 Gram(s) IV Push once  dextrose 50% Injectable 25 Gram(s) IV Push once  dextrose 50% Injectable 25 Gram(s) IV Push once  ALBUTerol/ipratropium for Nebulization 3 milliLiter(s) Nebulizer every 6 hours  docusate sodium 100 milliGRAM(s) Oral daily  senna 2 Tablet(s) Oral at bedtime  methadone    Tablet 50 milliGRAM(s) Oral daily  insulin glargine Injectable (LANTUS) 10 Unit(s) SubCutaneous at bedtime  insulin lispro Injectable (HumaLOG) 4 Unit(s) SubCutaneous three times a day before meals  amoxicillin  875 milliGRAM(s)/clavulanate 1 Tablet(s) Oral two times a day  QUEtiapine 25 milliGRAM(s) Oral daily  escitalopram 10 milliGRAM(s) Oral daily  polyethylene glycol 3350 17 Gram(s) Oral three times a day  amylase/lipase/protease  (CREON 12,000 Units) 3 Capsule(s) Oral three times a day with meals  famotidine    Tablet 20 milliGRAM(s) Oral two times a day  losartan 75 milliGRAM(s) Oral daily    MEDICATIONS  (PRN):  dextrose Gel 1 Dose(s) Oral once PRN Blood Glucose LESS THAN 70 milliGRAM(s)/deciliter  glucagon  Injectable 1 milliGRAM(s) IntraMuscular once PRN Glucose LESS THAN 70 milligrams/deciliter  acetaminophen    Suspension 650 milliGRAM(s) Oral every 6 hours PRN For Temp greater than 38 C (100.4 F)  melatonin 3 milliGRAM(s) Oral at bedtime PRN Insomnia      Allergies    No Known Allergies    Intolerances        LABS:                        11.8   6.8   )-----------( 240      ( 09 Aug 2017 07:31 )             34.1     08-09    130<L>  |  93<L>  |  22  ----------------------------<  139<H>  4.2   |  24  |  1.10    Ca    9.4      09 Aug 2017 07:31  Mg     2.0     08-09            RADIOLOGY & ADDITIONAL TESTS: INTERVAL HPI/OVERNIGHT EVENTS: No acute event overnight. Pt more alert per nursing staff. Pt reports feeling anxious and confused    ROS  CV: Denies chest pain, palpitations  RESP: Denies SOB  GI: Denies abdominal pain, constipation, diarrhea, nausea, vomiting  : Denies dysuria, hematuria, flank or back pain  ID: Denies fevers, chills  MSK: Denies joint pain   DERM: Denies any rashes, bruising, pruritis        VITAL SIGNS:  T(F): 98.6 (08-09-17 @ 05:24)  HR: 89 (08-09-17 @ 05:24)  BP: 163/77 (08-09-17 @ 05:24)  RR: 14 (08-09-17 @ 05:24)  SpO2: 95% (08-09-17 @ 05:24)  Wt(kg): --    PHYSICAL EXAM:    Constitutional: WDWN, NAD, resting comfortably   Head: NC/AT  Eyes: PERRL, EOMI, anicteric sclera, no mucosal pallor  ENT: no nasal discharge; uvula midline, no oropharyngeal erythema or exudates; dry MM  Neck: supple; no JVD or thyromegaly, no lymphadenopathy  Respiratory: RLL crackles; no W/R/R, no retractions  Cardiac: +S1/S2; RRR; no M/R/G  Gastrointestinal: abdomen soft, NT/ND; no rebound or guarding; +BSx4  Back: spine midline, no bony tenderness or step-offs; no CVAT B/L  Extremities: warm; no calf tenderness, no pedal edema, no cyanosis, no clubbing  Musculoskeletal: NROM x4; no joint swelling, tenderness or erythema  Vascular: 2+ radial,DP/PT pulses B/L  Dermatologic: no rash, no petechia   Lymphatic: no submandibular or cervical LAD  Neurologic: AAOx2-3; CNII-XII grossly intact; sensory symmetrically intact in B/L LE   Psychiatric: verbalizations, behaviors are appropriate      MEDICATIONS  (STANDING):  thiamine 100 milliGRAM(s) Oral three times a day  folic acid 1 milliGRAM(s) Oral daily  multivitamin 1 Tablet(s) Oral daily  heparin  Injectable 5000 Unit(s) SubCutaneous every 8 hours  QUEtiapine 50 milliGRAM(s) Oral at bedtime  insulin lispro (HumaLOG) corrective regimen sliding scale   SubCutaneous Before meals and at bedtime  dextrose 5%. 1000 milliLiter(s) (50 mL/Hr) IV Continuous <Continuous>  dextrose 50% Injectable 12.5 Gram(s) IV Push once  dextrose 50% Injectable 25 Gram(s) IV Push once  dextrose 50% Injectable 25 Gram(s) IV Push once  ALBUTerol/ipratropium for Nebulization 3 milliLiter(s) Nebulizer every 6 hours  docusate sodium 100 milliGRAM(s) Oral daily  senna 2 Tablet(s) Oral at bedtime  methadone    Tablet 50 milliGRAM(s) Oral daily  insulin glargine Injectable (LANTUS) 10 Unit(s) SubCutaneous at bedtime  insulin lispro Injectable (HumaLOG) 4 Unit(s) SubCutaneous three times a day before meals  amoxicillin  875 milliGRAM(s)/clavulanate 1 Tablet(s) Oral two times a day  QUEtiapine 25 milliGRAM(s) Oral daily  escitalopram 10 milliGRAM(s) Oral daily  polyethylene glycol 3350 17 Gram(s) Oral three times a day  amylase/lipase/protease  (CREON 12,000 Units) 3 Capsule(s) Oral three times a day with meals  famotidine    Tablet 20 milliGRAM(s) Oral two times a day  losartan 75 milliGRAM(s) Oral daily    MEDICATIONS  (PRN):  dextrose Gel 1 Dose(s) Oral once PRN Blood Glucose LESS THAN 70 milliGRAM(s)/deciliter  glucagon  Injectable 1 milliGRAM(s) IntraMuscular once PRN Glucose LESS THAN 70 milligrams/deciliter  acetaminophen    Suspension 650 milliGRAM(s) Oral every 6 hours PRN For Temp greater than 38 C (100.4 F)  melatonin 3 milliGRAM(s) Oral at bedtime PRN Insomnia      Allergies    No Known Allergies    Intolerances        LABS:                        11.8   6.8   )-----------( 240      ( 09 Aug 2017 07:31 )             34.1     08-09    130<L>  |  93<L>  |  22  ----------------------------<  139<H>  4.2   |  24  |  1.10    Ca    9.4      09 Aug 2017 07:31  Mg     2.0     08-09            RADIOLOGY & ADDITIONAL TESTS: INTERVAL HPI/OVERNIGHT EVENTS: No acute event overnight. Pt more alert per nursing staff. Pt reports feeling anxious and confused    ROS  CV: Denies chest pain, palpitations  RESP: Denies SOB  GI: Denies abdominal pain, constipation, diarrhea, nausea, vomiting  : Denies dysuria, hematuria, flank or back pain  ID: Denies fevers, chills  MSK: Denies joint pain   DERM: Denies any rashes, bruising, pruritis        VITAL SIGNS:  T(F): 98.6 (08-09-17 @ 05:24)  HR: 89 (08-09-17 @ 05:24)  BP: 163/77 (08-09-17 @ 05:24)  RR: 14 (08-09-17 @ 05:24)  SpO2: 95% (08-09-17 @ 05:24)  Wt(kg): --    PHYSICAL EXAM:    Constitutional: WDWN, NAD   Head: NC/AT  Eyes: PERRL, EOMI, anicteric sclera, no mucosal pallor  ENT: no nasal discharge; uvula midline, no oropharyngeal erythema or exudates; dry MM  Neck: supple; no JVD or thyromegaly, no lymphadenopathy  Respiratory: RLL crackles; no W/R/R, no retractions  Cardiac: +S1/S2; RRR; no M/R/G  Gastrointestinal: abdomen soft, NT/ND; no rebound or guarding; +BSx4  Back: spine midline, no bony tenderness or step-offs; no CVAT B/L  Extremities: warm; no calf tenderness, no pedal edema, no cyanosis, no clubbing  Musculoskeletal: NROM x4; no joint swelling, tenderness or erythema  Vascular: 2+ radial,DP/PT pulses B/L  Dermatologic: no rash, no petechia   Lymphatic: no submandibular or cervical LAD  Neurologic: AAOx2-3; CNII-XII grossly intact; sensory symmetrically intact in B/L LE   Psychiatric: anxious and restless verbalizations, behaviors are appropriate      MEDICATIONS  (STANDING):  thiamine 100 milliGRAM(s) Oral three times a day  folic acid 1 milliGRAM(s) Oral daily  multivitamin 1 Tablet(s) Oral daily  heparin  Injectable 5000 Unit(s) SubCutaneous every 8 hours  QUEtiapine 50 milliGRAM(s) Oral at bedtime  insulin lispro (HumaLOG) corrective regimen sliding scale   SubCutaneous Before meals and at bedtime  dextrose 5%. 1000 milliLiter(s) (50 mL/Hr) IV Continuous <Continuous>  dextrose 50% Injectable 12.5 Gram(s) IV Push once  dextrose 50% Injectable 25 Gram(s) IV Push once  dextrose 50% Injectable 25 Gram(s) IV Push once  ALBUTerol/ipratropium for Nebulization 3 milliLiter(s) Nebulizer every 6 hours  docusate sodium 100 milliGRAM(s) Oral daily  senna 2 Tablet(s) Oral at bedtime  methadone    Tablet 50 milliGRAM(s) Oral daily  insulin glargine Injectable (LANTUS) 10 Unit(s) SubCutaneous at bedtime  insulin lispro Injectable (HumaLOG) 4 Unit(s) SubCutaneous three times a day before meals  amoxicillin  875 milliGRAM(s)/clavulanate 1 Tablet(s) Oral two times a day  QUEtiapine 25 milliGRAM(s) Oral daily  escitalopram 10 milliGRAM(s) Oral daily  polyethylene glycol 3350 17 Gram(s) Oral three times a day  amylase/lipase/protease  (CREON 12,000 Units) 3 Capsule(s) Oral three times a day with meals  famotidine    Tablet 20 milliGRAM(s) Oral two times a day  losartan 75 milliGRAM(s) Oral daily    MEDICATIONS  (PRN):  dextrose Gel 1 Dose(s) Oral once PRN Blood Glucose LESS THAN 70 milliGRAM(s)/deciliter  glucagon  Injectable 1 milliGRAM(s) IntraMuscular once PRN Glucose LESS THAN 70 milligrams/deciliter  acetaminophen    Suspension 650 milliGRAM(s) Oral every 6 hours PRN For Temp greater than 38 C (100.4 F)  melatonin 3 milliGRAM(s) Oral at bedtime PRN Insomnia      Allergies    No Known Allergies    Intolerances        LABS:                        11.8   6.8   )-----------( 240      ( 09 Aug 2017 07:31 )             34.1     08-09    130<L>  |  93<L>  |  22  ----------------------------<  139<H>  4.2   |  24  |  1.10    Ca    9.4      09 Aug 2017 07:31  Mg     2.0     08-09            RADIOLOGY & ADDITIONAL TESTS:

## 2017-08-09 NOTE — DISCHARGE NOTE ADULT - PROVIDER TOKENS
FREE:[LAST:[[facility]],PHONE:[(   )    -],FAX:[(   )    -],ADDRESS:[[place] [address] [place] [address]]]

## 2017-08-09 NOTE — CHART NOTE - NSCHARTNOTEFT_GEN_A_CORE
Pt reporting active suicidal ideation with intents to jump out the window because he feels that he is not getting any better. Pt states that he feels that life is not worth living. Suicidal ideations started over the course of the day. Psychiatry called, saw patient and recommended a trial of clonopine 05 mg Q8 with no change in current Lexapro (10 mg daily) and Seroquel (25 mg AM, and 50 mg PM) regiment. Pt placed on constant observation

## 2017-08-09 NOTE — PROGRESS NOTE ADULT - PROBLEM SELECTOR PLAN 10
F: No fluids indicated at this time  E: Replete electrolytes as needed; K>4; Mg>2  N: on aspiration precautions, dysphagia diet (as per speech/swallow recs)    DVT ppx: Heparin SubQ 5000 u q8h (moderate risk)  CODE: FULL  Dispo: [facility] Nutrition/metabolism:  Barium swallow suggest patient is an aspiration risk. Patient much more alert on 50 mg Methadone dose. c/w dysphagia diet/aspiration precautions.  Chronic pancreatitis: c/w creon TID  E: Replete electrolytes as needed; K>4; Mg>2  N: on aspiration precautions, dysphagia diet (as per speech/swallow recs)  DVT ppx: Heparin SubQ 5000 u q8h (moderate risk)  CODE: FULL

## 2017-08-09 NOTE — PROGRESS NOTE ADULT - PROBLEM SELECTOR PLAN 8
Patient presenting with Creatinine 1.25, GFR 44 on admission (CKD stage 3); unknown baseline.   -Creatinine 1.1 today; continue to trend. 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 Son notes decline the 2 weeks prior to admission.   -Managed by PCP with Ativan 2 mg TID PRN. Will hold Ativan. Patient still tremulous/anxious however no associated reported nausea/vomiting/diarrhea. No need to monitor CIWA at this time.  -Psych following; Appreciate recs: c/w Seroquel 50 mg qHS for sundowning, Seroquel 25 mg PO in AM (standing - 10 am); Haldol 2mg PO PRN or Haldol 2 mg IM PRN for agitation; Lexapro 10 mg daily

## 2017-08-09 NOTE — DISCHARGE NOTE ADULT - MEDICATION SUMMARY - MEDICATIONS TO STOP TAKING
I will STOP taking the medications listed below when I get home from the hospital:  None I will STOP taking the medications listed below when I get home from the hospital:    methadone 10 mg oral tablet  -- 220 milligram(s) by mouth once a day

## 2017-08-09 NOTE — DISCHARGE NOTE ADULT - SECONDARY DIAGNOSIS.
Aspiration pneumonia of right lung Depression Hepatitis C Diabetes mellitus CKD (chronic kidney disease) Hypertension Sepsis

## 2017-08-09 NOTE — PROGRESS NOTE ADULT - PROBLEM SELECTOR PLAN 4
As per son patient suffers from unspecified dementia.  Has intermittent episodes of severe agitation and confusion.   -Appreciate psychiatry recs  -Continue quetiapine and fluoxetine

## 2017-08-09 NOTE — PROGRESS NOTE BEHAVIORAL HEALTH - NSBHCONSULTFOLLOWAFTERCARE_PSY_A_CORE FT
To follow up with [facility] psychiatrist
F/u with [facility] psychiatrist
Follow up with psychiatrist at [facility]
No need for psychiatric follow up
Patient would benefit from higher level of care; no indication for psychiatric follow up at this time
Plan currently dispo to [facility] when medically stable, no need for psychiatric follow up at this time

## 2017-08-09 NOTE — DISCHARGE NOTE ADULT - NS AS ACTIVITY OBS
Walking-Outdoors allowed/Do not drive or operate machinery/Walking-Indoors allowed/Showering allowed/No Heavy lifting/straining/Bathing allowed/Return to Work/School allowed

## 2017-08-09 NOTE — PROGRESS NOTE ADULT - PROBLEM SELECTOR PROBLEM 7
Dementia with behavioral disturbance, unspecified dementia type Hepatitis C virus infection without hepatic coma, unspecified chronicity

## 2017-08-10 ENCOUNTER — INPATIENT (INPATIENT)
Facility: HOSPITAL | Age: 67
LOS: 10 days | Discharge: HOME | DRG: 881 | End: 2017-08-21
Attending: PSYCHIATRY & NEUROLOGY | Admitting: PSYCHIATRY & NEUROLOGY
Payer: MEDICARE

## 2017-08-10 VITALS
RESPIRATION RATE: 17 BRPM | DIASTOLIC BLOOD PRESSURE: 81 MMHG | HEIGHT: 68 IN | TEMPERATURE: 98 F | HEART RATE: 79 BPM | SYSTOLIC BLOOD PRESSURE: 189 MMHG | WEIGHT: 117.51 LBS

## 2017-08-10 VITALS
RESPIRATION RATE: 16 BRPM | SYSTOLIC BLOOD PRESSURE: 157 MMHG | DIASTOLIC BLOOD PRESSURE: 83 MMHG | OXYGEN SATURATION: 95 % | HEART RATE: 80 BPM | TEMPERATURE: 98 F

## 2017-08-10 DIAGNOSIS — F33.2 MAJOR DEPRESSIVE DISORDER, RECURRENT SEVERE WITHOUT PSYCHOTIC FEATURES: ICD-10-CM

## 2017-08-10 PROCEDURE — 92610 EVALUATE SWALLOWING FUNCTION: CPT | Mod: GN

## 2017-08-10 PROCEDURE — 82140 ASSAY OF AMMONIA: CPT

## 2017-08-10 PROCEDURE — 99233 SBSQ HOSP IP/OBS HIGH 50: CPT

## 2017-08-10 PROCEDURE — 92526 ORAL FUNCTION THERAPY: CPT | Mod: GN

## 2017-08-10 PROCEDURE — 84443 ASSAY THYROID STIM HORMONE: CPT

## 2017-08-10 PROCEDURE — 85027 COMPLETE CBC AUTOMATED: CPT

## 2017-08-10 PROCEDURE — 36415 COLL VENOUS BLD VENIPUNCTURE: CPT

## 2017-08-10 PROCEDURE — 97116 GAIT TRAINING THERAPY: CPT

## 2017-08-10 PROCEDURE — 83605 ASSAY OF LACTIC ACID: CPT

## 2017-08-10 PROCEDURE — 86901 BLOOD TYPING SEROLOGIC RH(D): CPT

## 2017-08-10 PROCEDURE — 93005 ELECTROCARDIOGRAM TRACING: CPT

## 2017-08-10 PROCEDURE — 76705 ECHO EXAM OF ABDOMEN: CPT

## 2017-08-10 PROCEDURE — 99239 HOSP IP/OBS DSCHRG MGMT >30: CPT

## 2017-08-10 PROCEDURE — 71045 X-RAY EXAM CHEST 1 VIEW: CPT

## 2017-08-10 PROCEDURE — 85610 PROTHROMBIN TIME: CPT

## 2017-08-10 PROCEDURE — 80048 BASIC METABOLIC PNL TOTAL CA: CPT

## 2017-08-10 PROCEDURE — 86780 TREPONEMA PALLIDUM: CPT

## 2017-08-10 PROCEDURE — 74230 X-RAY XM SWLNG FUNCJ C+: CPT

## 2017-08-10 PROCEDURE — 82803 BLOOD GASES ANY COMBINATION: CPT

## 2017-08-10 PROCEDURE — 85025 COMPLETE CBC W/AUTO DIFF WBC: CPT

## 2017-08-10 PROCEDURE — 86900 BLOOD TYPING SEROLOGIC ABO: CPT

## 2017-08-10 PROCEDURE — 70450 CT HEAD/BRAIN W/O DYE: CPT

## 2017-08-10 PROCEDURE — 86850 RBC ANTIBODY SCREEN: CPT

## 2017-08-10 PROCEDURE — 99285 EMERGENCY DEPT VISIT HI MDM: CPT | Mod: 25

## 2017-08-10 PROCEDURE — 82248 BILIRUBIN DIRECT: CPT

## 2017-08-10 PROCEDURE — 94640 AIRWAY INHALATION TREATMENT: CPT

## 2017-08-10 PROCEDURE — 82607 VITAMIN B-12: CPT

## 2017-08-10 PROCEDURE — 85730 THROMBOPLASTIN TIME PARTIAL: CPT

## 2017-08-10 PROCEDURE — 97161 PT EVAL LOW COMPLEX 20 MIN: CPT

## 2017-08-10 PROCEDURE — 81001 URINALYSIS AUTO W/SCOPE: CPT

## 2017-08-10 PROCEDURE — 80053 COMPREHEN METABOLIC PANEL: CPT

## 2017-08-10 PROCEDURE — 82010 KETONE BODYS QUAN: CPT

## 2017-08-10 PROCEDURE — 97530 THERAPEUTIC ACTIVITIES: CPT

## 2017-08-10 PROCEDURE — 96375 TX/PRO/DX INJ NEW DRUG ADDON: CPT

## 2017-08-10 PROCEDURE — 83735 ASSAY OF MAGNESIUM: CPT

## 2017-08-10 PROCEDURE — 83036 HEMOGLOBIN GLYCOSYLATED A1C: CPT

## 2017-08-10 PROCEDURE — 92611 MOTION FLUOROSCOPY/SWALLOW: CPT | Mod: GN

## 2017-08-10 PROCEDURE — 80307 DRUG TEST PRSMV CHEM ANLYZR: CPT

## 2017-08-10 PROCEDURE — 87040 BLOOD CULTURE FOR BACTERIA: CPT

## 2017-08-10 PROCEDURE — 96374 THER/PROPH/DIAG INJ IV PUSH: CPT

## 2017-08-10 PROCEDURE — 82746 ASSAY OF FOLIC ACID SERUM: CPT

## 2017-08-10 RX ORDER — QUETIAPINE FUMARATE 200 MG/1
1 TABLET, FILM COATED ORAL
Qty: 0 | Refills: 0 | COMMUNITY
Start: 2017-08-10

## 2017-08-10 RX ORDER — SODIUM CHLORIDE 9 MG/ML
1000 INJECTION, SOLUTION INTRAVENOUS
Qty: 0 | Refills: 0 | Status: DISCONTINUED | OUTPATIENT
Start: 2017-08-10 | End: 2017-08-11

## 2017-08-10 RX ORDER — LOSARTAN POTASSIUM 100 MG/1
3 TABLET, FILM COATED ORAL
Qty: 0 | Refills: 0 | COMMUNITY

## 2017-08-10 RX ORDER — INSULIN GLARGINE 100 [IU]/ML
13 INJECTION, SOLUTION SUBCUTANEOUS
Qty: 30 | Refills: 0 | OUTPATIENT
Start: 2017-08-10 | End: 2017-09-09

## 2017-08-10 RX ORDER — THIAMINE MONONITRATE (VIT B1) 100 MG
1 TABLET ORAL
Qty: 0 | Refills: 0 | COMMUNITY
Start: 2017-08-10

## 2017-08-10 RX ORDER — DEXTROSE 50 % IN WATER 50 %
25 SYRINGE (ML) INTRAVENOUS ONCE
Qty: 0 | Refills: 0 | Status: DISCONTINUED | OUTPATIENT
Start: 2017-08-10 | End: 2017-08-11

## 2017-08-10 RX ORDER — GLUCAGON INJECTION, SOLUTION 0.5 MG/.1ML
1 INJECTION, SOLUTION SUBCUTANEOUS ONCE
Qty: 0 | Refills: 0 | Status: DISCONTINUED | OUTPATIENT
Start: 2017-08-10 | End: 2017-08-21

## 2017-08-10 RX ORDER — LOSARTAN POTASSIUM 100 MG/1
25 TABLET, FILM COATED ORAL ONCE
Qty: 0 | Refills: 0 | Status: COMPLETED | OUTPATIENT
Start: 2017-08-10 | End: 2017-08-10

## 2017-08-10 RX ORDER — POLYETHYLENE GLYCOL 3350 17 G/17G
17 POWDER, FOR SOLUTION ORAL
Qty: 0 | Refills: 0 | Status: DISCONTINUED | OUTPATIENT
Start: 2017-08-10 | End: 2017-08-11

## 2017-08-10 RX ORDER — SIMETHICONE 80 MG/1
80 TABLET, CHEWABLE ORAL ONCE
Qty: 0 | Refills: 0 | Status: COMPLETED | OUTPATIENT
Start: 2017-08-10 | End: 2017-08-10

## 2017-08-10 RX ORDER — DOCUSATE SODIUM 100 MG
100 CAPSULE ORAL DAILY
Qty: 0 | Refills: 0 | Status: DISCONTINUED | OUTPATIENT
Start: 2017-08-10 | End: 2017-08-11

## 2017-08-10 RX ORDER — METOPROLOL TARTRATE 50 MG
50 TABLET ORAL
Qty: 0 | Refills: 0 | Status: DISCONTINUED | OUTPATIENT
Start: 2017-08-10 | End: 2017-08-10

## 2017-08-10 RX ORDER — METHADONE HYDROCHLORIDE 40 MG/1
50 TABLET ORAL DAILY
Qty: 0 | Refills: 0 | Status: DISCONTINUED | OUTPATIENT
Start: 2017-08-10 | End: 2017-08-15

## 2017-08-10 RX ORDER — LABETALOL HCL 100 MG
5 TABLET ORAL ONCE
Qty: 0 | Refills: 0 | Status: COMPLETED | OUTPATIENT
Start: 2017-08-10 | End: 2017-08-10

## 2017-08-10 RX ORDER — DEXTROSE 50 % IN WATER 50 %
1 SYRINGE (ML) INTRAVENOUS ONCE
Qty: 0 | Refills: 0 | Status: DISCONTINUED | OUTPATIENT
Start: 2017-08-10 | End: 2017-08-21

## 2017-08-10 RX ORDER — SENNA PLUS 8.6 MG/1
2 TABLET ORAL AT BEDTIME
Qty: 0 | Refills: 0 | Status: DISCONTINUED | OUTPATIENT
Start: 2017-08-10 | End: 2017-08-11

## 2017-08-10 RX ORDER — LOSARTAN POTASSIUM 100 MG/1
100 TABLET, FILM COATED ORAL DAILY
Qty: 0 | Refills: 0 | Status: DISCONTINUED | OUTPATIENT
Start: 2017-08-10 | End: 2017-08-10

## 2017-08-10 RX ORDER — LOSARTAN POTASSIUM 100 MG/1
100 TABLET, FILM COATED ORAL DAILY
Qty: 0 | Refills: 0 | Status: DISCONTINUED | OUTPATIENT
Start: 2017-08-10 | End: 2017-08-12

## 2017-08-10 RX ORDER — THIAMINE MONONITRATE (VIT B1) 100 MG
100 TABLET ORAL DAILY
Qty: 0 | Refills: 0 | Status: DISCONTINUED | OUTPATIENT
Start: 2017-08-10 | End: 2017-08-21

## 2017-08-10 RX ORDER — FOLIC ACID 0.8 MG
1 TABLET ORAL
Qty: 0 | Refills: 0 | COMMUNITY
Start: 2017-08-10

## 2017-08-10 RX ORDER — LIPASE/PROTEASE/AMYLASE 16-48-48K
3 CAPSULE,DELAYED RELEASE (ENTERIC COATED) ORAL
Qty: 0 | Refills: 0 | Status: DISCONTINUED | OUTPATIENT
Start: 2017-08-10 | End: 2017-08-21

## 2017-08-10 RX ORDER — LOSARTAN POTASSIUM 100 MG/1
100 TABLET, FILM COATED ORAL DAILY
Qty: 0 | Refills: 0 | Status: DISCONTINUED | OUTPATIENT
Start: 2017-08-11 | End: 2017-08-10

## 2017-08-10 RX ORDER — FOLIC ACID 0.8 MG
1 TABLET ORAL DAILY
Qty: 0 | Refills: 0 | Status: DISCONTINUED | OUTPATIENT
Start: 2017-08-10 | End: 2017-08-21

## 2017-08-10 RX ORDER — ACETAMINOPHEN 500 MG
650 TABLET ORAL EVERY 6 HOURS
Qty: 0 | Refills: 0 | Status: DISCONTINUED | OUTPATIENT
Start: 2017-08-10 | End: 2017-08-21

## 2017-08-10 RX ORDER — DEXTROSE 50 % IN WATER 50 %
12.5 SYRINGE (ML) INTRAVENOUS ONCE
Qty: 0 | Refills: 0 | Status: DISCONTINUED | OUTPATIENT
Start: 2017-08-10 | End: 2017-08-11

## 2017-08-10 RX ORDER — INSULIN LISPRO 100/ML
VIAL (ML) SUBCUTANEOUS
Qty: 0 | Refills: 0 | Status: DISCONTINUED | OUTPATIENT
Start: 2017-08-10 | End: 2017-08-21

## 2017-08-10 RX ORDER — ESCITALOPRAM OXALATE 10 MG/1
10 TABLET, FILM COATED ORAL DAILY
Qty: 0 | Refills: 0 | Status: DISCONTINUED | OUTPATIENT
Start: 2017-08-10 | End: 2017-08-14

## 2017-08-10 RX ORDER — POLYETHYLENE GLYCOL 3350 17 G/17G
17 POWDER, FOR SOLUTION ORAL
Qty: 0 | Refills: 0 | COMMUNITY
Start: 2017-08-10

## 2017-08-10 RX ORDER — CLONAZEPAM 1 MG
1 TABLET ORAL
Qty: 0 | Refills: 0 | COMMUNITY
Start: 2017-08-10

## 2017-08-10 RX ORDER — CILOSTAZOL 100 MG/1
1 TABLET ORAL
Qty: 0 | Refills: 0 | COMMUNITY

## 2017-08-10 RX ORDER — QUETIAPINE FUMARATE 200 MG/1
50 TABLET, FILM COATED ORAL AT BEDTIME
Qty: 0 | Refills: 0 | Status: DISCONTINUED | OUTPATIENT
Start: 2017-08-10 | End: 2017-08-14

## 2017-08-10 RX ORDER — SITAGLIPTIN AND METFORMIN HYDROCHLORIDE 500; 50 MG/1; MG/1
1 TABLET, FILM COATED ORAL
Qty: 0 | Refills: 0 | COMMUNITY

## 2017-08-10 RX ORDER — INSULIN LISPRO 100/ML
4 VIAL (ML) SUBCUTANEOUS
Qty: 0 | Refills: 0 | Status: DISCONTINUED | OUTPATIENT
Start: 2017-08-10 | End: 2017-08-11

## 2017-08-10 RX ORDER — FAMOTIDINE 10 MG/ML
20 INJECTION INTRAVENOUS
Qty: 0 | Refills: 0 | Status: DISCONTINUED | OUTPATIENT
Start: 2017-08-10 | End: 2017-08-21

## 2017-08-10 RX ORDER — INSULIN GLARGINE 100 [IU]/ML
13 INJECTION, SOLUTION SUBCUTANEOUS AT BEDTIME
Qty: 0 | Refills: 0 | Status: DISCONTINUED | OUTPATIENT
Start: 2017-08-10 | End: 2017-08-10

## 2017-08-10 RX ORDER — METOPROLOL TARTRATE 50 MG
50 TABLET ORAL
Qty: 0 | Refills: 0 | Status: DISCONTINUED | OUTPATIENT
Start: 2017-08-10 | End: 2017-08-12

## 2017-08-10 RX ORDER — CLONAZEPAM 1 MG
0.5 TABLET ORAL THREE TIMES A DAY
Qty: 0 | Refills: 0 | Status: DISCONTINUED | OUTPATIENT
Start: 2017-08-10 | End: 2017-08-15

## 2017-08-10 RX ORDER — INSULIN DETEMIR 100/ML (3)
5 INSULIN PEN (ML) SUBCUTANEOUS
Qty: 0 | Refills: 0 | COMMUNITY

## 2017-08-10 RX ORDER — INSULIN GLARGINE 100 [IU]/ML
13 INJECTION, SOLUTION SUBCUTANEOUS AT BEDTIME
Qty: 0 | Refills: 0 | Status: DISCONTINUED | OUTPATIENT
Start: 2017-08-10 | End: 2017-08-11

## 2017-08-10 RX ORDER — ACETAMINOPHEN 500 MG
650 TABLET ORAL EVERY 6 HOURS
Qty: 0 | Refills: 0 | Status: DISCONTINUED | OUTPATIENT
Start: 2017-08-10 | End: 2017-08-10

## 2017-08-10 RX ADMIN — Medication 100 MILLIGRAM(S): at 13:36

## 2017-08-10 RX ADMIN — SENNA PLUS 2 TABLET(S): 8.6 TABLET ORAL at 21:19

## 2017-08-10 RX ADMIN — POLYETHYLENE GLYCOL 3350 17 GRAM(S): 17 POWDER, FOR SOLUTION ORAL at 13:36

## 2017-08-10 RX ADMIN — Medication 3 CAPSULE(S): at 13:36

## 2017-08-10 RX ADMIN — LOSARTAN POTASSIUM 25 MILLIGRAM(S): 100 TABLET, FILM COATED ORAL at 09:19

## 2017-08-10 RX ADMIN — QUETIAPINE FUMARATE 50 MILLIGRAM(S): 200 TABLET, FILM COATED ORAL at 21:19

## 2017-08-10 RX ADMIN — LOSARTAN POTASSIUM 75 MILLIGRAM(S): 100 TABLET, FILM COATED ORAL at 06:29

## 2017-08-10 RX ADMIN — Medication 1 TABLET(S): at 06:31

## 2017-08-10 RX ADMIN — FAMOTIDINE 20 MILLIGRAM(S): 10 INJECTION INTRAVENOUS at 21:19

## 2017-08-10 RX ADMIN — QUETIAPINE FUMARATE 25 MILLIGRAM(S): 200 TABLET, FILM COATED ORAL at 11:46

## 2017-08-10 RX ADMIN — HEPARIN SODIUM 5000 UNIT(S): 5000 INJECTION INTRAVENOUS; SUBCUTANEOUS at 13:35

## 2017-08-10 RX ADMIN — Medication 1 TABLET(S): at 11:46

## 2017-08-10 RX ADMIN — FAMOTIDINE 20 MILLIGRAM(S): 10 INJECTION INTRAVENOUS at 06:29

## 2017-08-10 RX ADMIN — METHADONE HYDROCHLORIDE 50 MILLIGRAM(S): 40 TABLET ORAL at 11:47

## 2017-08-10 RX ADMIN — Medication 3 MILLILITER(S): at 06:29

## 2017-08-10 RX ADMIN — Medication 100 MILLIGRAM(S): at 06:29

## 2017-08-10 RX ADMIN — POLYETHYLENE GLYCOL 3350 17 GRAM(S): 17 POWDER, FOR SOLUTION ORAL at 21:19

## 2017-08-10 RX ADMIN — Medication 3 MILLILITER(S): at 11:48

## 2017-08-10 RX ADMIN — ESCITALOPRAM OXALATE 10 MILLIGRAM(S): 10 TABLET, FILM COATED ORAL at 11:46

## 2017-08-10 RX ADMIN — Medication 4 UNIT(S): at 12:41

## 2017-08-10 RX ADMIN — SIMETHICONE 80 MILLIGRAM(S): 80 TABLET, CHEWABLE ORAL at 11:46

## 2017-08-10 RX ADMIN — INSULIN GLARGINE 13 UNIT(S): 100 INJECTION, SOLUTION SUBCUTANEOUS at 21:22

## 2017-08-10 RX ADMIN — Medication 3 CAPSULE(S): at 09:19

## 2017-08-10 RX ADMIN — HEPARIN SODIUM 5000 UNIT(S): 5000 INJECTION INTRAVENOUS; SUBCUTANEOUS at 06:29

## 2017-08-10 RX ADMIN — Medication 50 MILLIGRAM(S): at 21:19

## 2017-08-10 RX ADMIN — Medication 2: at 12:41

## 2017-08-10 RX ADMIN — Medication 4 UNIT(S): at 07:21

## 2017-08-10 RX ADMIN — Medication 5 MILLIGRAM(S): at 11:45

## 2017-08-10 RX ADMIN — Medication 1 MILLIGRAM(S): at 11:46

## 2017-08-10 RX ADMIN — Medication 0.5 MILLIGRAM(S): at 21:19

## 2017-08-10 RX ADMIN — Medication 100 MILLIGRAM(S): at 11:47

## 2017-08-10 NOTE — PROGRESS NOTE BEHAVIORAL HEALTH - NSBHLOC_PSY_A_CORE
Alert
Lethargic, arousable to verbal stimulus
Alert
Lethargic, arousable to verbal stimulus
Alert

## 2017-08-10 NOTE — PROGRESS NOTE BEHAVIORAL HEALTH - NSBHCONSULTMEDPLAN_PSY_A_CORE FT
HTN  Continue metoprolol 50 mg PO q12h  Continue losartan 100 mg PO daily  Medicine consult to follow patient    DM:  Continue Lantus 13U SC HS  Continue Humalog 4U SC before meals    chronic pancreatitis HTN  -Continue metoprolol 50 mg PO q12h  -Continue losartan 100 mg PO daily  -Medicine consult to follow patient    DM:  -Continue Lantus 13U SC HS  -Continue Humalog 4U SC before meals  -Humalog sliding scale    chronic pancreatitis:  -Continue Creon 3 tabs with meals  -Continue famotidine 20 mg PO q12h  -Continue thiamine, MVI, folic acid    Constipation:  -Continue colace, senna, Miralax

## 2017-08-10 NOTE — PROGRESS NOTE BEHAVIORAL HEALTH - NSBHFUPINTERVALHXFT_PSY_A_CORE
Patient continues to report active suicidality with plan to jump out the window in setting of feeling "anxious" and "bad."  Is reluctant to consent to inpatient psychiatric admission but says "I want to feel better."    Left message for jonel Owens at [phone].    Transfer summary:  66M PPH heroin use disorder on methadone, one prior psychiatric admission for suicide attempt via OD 2 years ago following wife's death, on prescribed benzodiazepines by PMD, unclear prior diagnosis of dementia, PMH HTN, DM, CKD, chronic pancreatitis, presenting to [facility] after wandering from home on 8/1/17, admitted to [address] [place] [address] for AMS. HCT unremarkable, patient s/p treatment for presumed aspiration PNA.  Patient was delirious with intermittent agitation and sedation following admission.  Methadone was tapered from 220 mg PO daily (confirmed by clinic) to 50 mg PO daily on 8/4.  Xanax was held as it was unclear whether patient had been taking the medication prior to admission and he was delirious with no consistent vital sign abnormalities or significant score on CIWA.  Patient appearing alert as of 8/7/17 indicating patient's presentation last week was largely in setting of overmedication on methadone and benzodiazepines, with potential overlying delirium 2/2 PNA.  Lexapro 10 mg PO daily was started on 8/7/17 as patient reported depression in setting of wife's death 2 years ago (unexpected death, wife choked on marshmallow, patient attempted unsuccessfully to perform Heimlich).  Plan was to discharge home yesterday, however patient reported active SI with plan to jump out the window to medical team on 8/9/17 and now requires inpatient psychiatric admission.  Patient with intermittent hypertension, to be followed by medicine consult on [facility].

## 2017-08-10 NOTE — PROGRESS NOTE BEHAVIORAL HEALTH - NSBHPTASSESSDT_PSY_A_CORE
02-Aug-2017 09:00
04-Aug-2017 09:00
03-Aug-2017 08:45
07-Aug-2017 10:00
08-Aug-2017 10:45
09-Aug-2017 09:30
10-Aug-2017 10:30

## 2017-08-10 NOTE — PROGRESS NOTE BEHAVIORAL HEALTH - NSBHCHARTREVIEWLAB_PSY_A_CORE FT
08-07    135  |  98  |  16  ----------------------------<  164<H>  4.4   |  25  |  1.20    Ca    9.2      07 Aug 2017 07:03  Mg     1.8     08-07                          10.8   4.6   )-----------( 222      ( 07 Aug 2017 07:04 )             31.8
9.1    6.4   )-----------( 203      ( 02 Aug 2017 15:46 )             27.5   08-03    135  |  103  |  10  ----------------------------<  177<H>  4.6   |  22  |  1.10    Ca    8.1<L>      03 Aug 2017 06:59  Mg     1.8     08-03    TPro  5.9<L>  /  Alb  2.4<L>  /  TBili  1.0  /  DBili  0.5<H>  /  AST  63<H>  /  ALT  52<H>  /  AlkPhos  51  08-02
11.8   6.8   )-----------( 240      ( 09 Aug 2017 07:31 )             34.1   08-09    130<L>  |  93<L>  |  22  ----------------------------<  139<H>  4.2   |  24  |  1.10    Ca    9.4      09 Aug 2017 07:31  Mg     2.0     08-09
11.8   6.8   )-----------( 240      ( 09 Aug 2017 07:31 )             34.1   08-09    130<L>  |  93<L>  |  22  ----------------------------<  139<H>  4.2   |  24  |  1.10    Ca    9.4      09 Aug 2017 07:31  Mg     2.0     08-09

## 2017-08-10 NOTE — PROGRESS NOTE BEHAVIORAL HEALTH - NSBHADMITCOUNSEL_PSY_A_CORE
client/family/caregiver education/risks and benefits of treatment options
client/family/caregiver education/diagnostic results/impressions and/or recommended studies
client/family/caregiver education/risk factor reduction
instructions for management, treatment and follow up/risks and benefits of treatment options/risk factor reduction/client/family/caregiver education
client/family/caregiver education/risks and benefits of treatment options
risk factor reduction
client/family/caregiver education/diagnostic results/impressions and/or recommended studies

## 2017-08-10 NOTE — PROGRESS NOTE BEHAVIORAL HEALTH - PRIMARY DX
Dementia with behavioral disturbance, unspecified dementia type
Dementia with behavioral disturbance, unspecified dementia type
Mild episode of recurrent major depressive disorder
Delirium
Delirium
Severe episode of recurrent major depressive disorder, without psychotic features
Heroin use disorder, severe, in sustained remission, on maintenance therapy, dependence

## 2017-08-10 NOTE — PROGRESS NOTE BEHAVIORAL HEALTH - LANGUAGE
No abnormalities noted

## 2017-08-10 NOTE — PROGRESS NOTE BEHAVIORAL HEALTH - NSBHADMITCOORDWITH_PSY_A_CORE
medical staff
medical staff/family/Caregiver
medical staff
medical staff

## 2017-08-10 NOTE — PROGRESS NOTE BEHAVIORAL HEALTH - NS ED BHA MSE GENERAL APPEARANCE
Well developed

## 2017-08-10 NOTE — PROGRESS NOTE BEHAVIORAL HEALTH - NSBHCONSULTSUBSTPLAN_PSY_A_CORE FT
Benzodiazepine withdrawal  Start Klonopin 0.5 mg PO q8h with plan to taper off prior to discharge    Heroin use disorder on MMT  Continue methadone 50 mg PO daily Benzodiazepine withdrawal  -Start Klonopin 0.5 mg PO q8h with plan to taper off prior to discharge    Heroin use disorder on MMT  -Continue methadone 50 mg PO daily

## 2017-08-10 NOTE — PROGRESS NOTE BEHAVIORAL HEALTH - NS ED BHA MSE SPEECH SPONTANEITY
Increased latency
Other
Increased latency

## 2017-08-10 NOTE — PROGRESS NOTE BEHAVIORAL HEALTH - NSBHCHARTREVIEWVS_PSY_A_CORE FT
Vital Signs Last 24 Hrs  T(C): 37.3 (02 Aug 2017 09:36), Max: 37.9 (01 Aug 2017 22:00)  T(F): 99.1 (02 Aug 2017 09:36), Max: 100.3 (01 Aug 2017 22:00)  HR: 106 (02 Aug 2017 09:36) (98 - 106)  BP: 150/74 (02 Aug 2017 09:36) (124/71 - 152/70)  BP(mean): --  RR: 16 (02 Aug 2017 09:36) (14 - 16)  SpO2: 94% (02 Aug 2017 09:36) (94% - 94%)
Vital Signs Last 24 Hrs  T(C): 36.9 (03 Aug 2017 06:25), Max: 37.3 (02 Aug 2017 09:36)  T(F): 98.5 (03 Aug 2017 06:25), Max: 99.1 (02 Aug 2017 09:36)  HR: 86 (03 Aug 2017 06:25) (73 - 106)  BP: 136/78 (03 Aug 2017 06:25) (124/73 - 150/74)  BP(mean): --  RR: 17 (03 Aug 2017 06:25) (15 - 17)  SpO2: 95% (03 Aug 2017 06:25) (92% - 95%)
Vital Signs Last 24 Hrs  T(C): 37.3 (07 Aug 2017 12:58), Max: 37.3 (07 Aug 2017 12:58)  T(F): 99.1 (07 Aug 2017 12:58), Max: 99.1 (07 Aug 2017 12:58)  HR: 105 (07 Aug 2017 12:58) (98 - 115)  BP: 170/83 (07 Aug 2017 12:58) (148/82 - 176/80)  BP(mean): --  RR: 18 (07 Aug 2017 12:58) (18 - 21)  SpO2: 95% (07 Aug 2017 12:58) (93% - 95%)
Vital Signs Last 24 Hrs  T(C): 36.7 (10 Aug 2017 09:25), Max: 36.9 (09 Aug 2017 21:08)  T(F): 98.1 (10 Aug 2017 09:25), Max: 98.4 (09 Aug 2017 21:08)  HR: 99 (10 Aug 2017 09:25) (88 - 100)  BP: 180/86 (10 Aug 2017 09:25) (150/76 - 194/80)  BP(mean): --  RR: 16 (10 Aug 2017 09:25) (14 - 16)  SpO2: 94% (10 Aug 2017 09:25) (94% - 97%)
Vital Signs Last 24 Hrs  T(C): 37 (09 Aug 2017 05:24), Max: 37 (09 Aug 2017 05:24)  T(F): 98.6 (09 Aug 2017 05:24), Max: 98.6 (09 Aug 2017 05:24)  HR: 89 (09 Aug 2017 05:24) (89 - 101)  BP: 163/77 (09 Aug 2017 05:24) (121/84 - 181/82)  BP(mean): --  RR: 14 (09 Aug 2017 05:24) (14 - 16)  SpO2: 95% (09 Aug 2017 05:24) (95% - 98%)
Vital Signs Last 24 Hrs  T(C): 36.5 (08 Aug 2017 11:16), Max: 37.3 (07 Aug 2017 12:58)  T(F): 97.7 (08 Aug 2017 11:16), Max: 99.1 (07 Aug 2017 12:58)  HR: 99 (08 Aug 2017 11:16) (91 - 105)  BP: 146/84 (08 Aug 2017 11:16) (134/82 - 184/77)  BP(mean): --  RR: 16 (08 Aug 2017 11:16) (16 - 18)  SpO2: 95% (08 Aug 2017 11:16) (95% - 97%)

## 2017-08-10 NOTE — PROGRESS NOTE BEHAVIORAL HEALTH - NSBHFUPREASONCONS_PSY_A_CORE
agitation/dementia
agitation/dementia/med management
dementia/agitation
dementia/delerium/agitation
dementia/depression/delerium/agitation
depression/suicidality/dementia
med management

## 2017-08-10 NOTE — PROGRESS NOTE BEHAVIORAL HEALTH - NSBHFUPINTERVALCCFT_PSY_A_CORE
"I'm fine"
"I'm OK"
"I'm in withdrawal"
Unable to assess
"I need something"
"I feel bad"
"The night was steady"

## 2017-08-10 NOTE — PROGRESS NOTE BEHAVIORAL HEALTH - SUMMARY
66M h/o prior suicide attempt and admission, heroin use disorder on methadone, benzodiazepine use disorder, PMH HTN, CKD, DM, chronic pancreatitis, HCV, presented last week to [facility] with AMS.  AMS likely multifactorial and related to aspiration PNA, poor nutrition/dehydration, and methadone and benzodiazepine overuse.  AMS now largely resolved but patient reporting depression with SI and plan to jump out the window.  Patient requires inpatient psychiatric admission for observation and stabilization as he is a danger to himself at this time.

## 2017-08-10 NOTE — PROGRESS NOTE BEHAVIORAL HEALTH - NSBHFUPTYPE_PSY_A_CORE
Consult Follow Up

## 2017-08-10 NOTE — PROGRESS NOTE BEHAVIORAL HEALTH - PERCEPTIONS
No abnormalities
Other
No abnormalities
No abnormalities

## 2017-08-10 NOTE — PROGRESS NOTE BEHAVIORAL HEALTH - NSBHCHARTREVIEWIMAGING_PSY_A_CORE FT
8/2 abd U/S  IMPRESSION:  1.  Slightly echogenic and coarsened hepatic parenchyma, which may be   seen in hepatitis.  2.  Small amount of gallbladder sludge.    8/2 HCT  unremarkable
Head CT: WNL

## 2017-08-10 NOTE — PROGRESS NOTE BEHAVIORAL HEALTH - NSBHCONSULTPSYCHPLAN_PSY_A_CORE FT
Major depression  Continue Lexapro 10 mg PO daily  Continue Seroquel 25 mg PO daily and 50 mg PO HS Major depression  -Continue Lexapro 10 mg PO daily  -Continue Seroquel 50 mg PO HS

## 2017-08-10 NOTE — PROGRESS NOTE BEHAVIORAL HEALTH - SECONDARY DX1
Heroin use disorder, severe, in sustained remission, on maintenance therapy, dependence
Dementia with behavioral disturbance, unspecified dementia type
Dementia with behavioral disturbance, unspecified dementia type
Heroin use disorder, severe, in sustained remission, on maintenance therapy, dependence
Dementia with behavioral disturbance, unspecified dementia type

## 2017-08-11 DIAGNOSIS — N18.9 CHRONIC KIDNEY DISEASE, UNSPECIFIED: ICD-10-CM

## 2017-08-11 DIAGNOSIS — G92 TOXIC ENCEPHALOPATHY: ICD-10-CM

## 2017-08-11 DIAGNOSIS — I10 ESSENTIAL (PRIMARY) HYPERTENSION: ICD-10-CM

## 2017-08-11 DIAGNOSIS — K74.60 UNSPECIFIED CIRRHOSIS OF LIVER: ICD-10-CM

## 2017-08-11 DIAGNOSIS — F32.9 MAJOR DEPRESSIVE DISORDER, SINGLE EPISODE, UNSPECIFIED: ICD-10-CM

## 2017-08-11 DIAGNOSIS — B19.20 UNSPECIFIED VIRAL HEPATITIS C WITHOUT HEPATIC COMA: ICD-10-CM

## 2017-08-11 DIAGNOSIS — E11.9 TYPE 2 DIABETES MELLITUS WITHOUT COMPLICATIONS: ICD-10-CM

## 2017-08-11 LAB
ANION GAP SERPL CALC-SCNC: 12 MMOL/L — SIGNIFICANT CHANGE UP (ref 5–17)
BUN SERPL-MCNC: 30 MG/DL — HIGH (ref 7–23)
CALCIUM SERPL-MCNC: 9.3 MG/DL — SIGNIFICANT CHANGE UP (ref 8.4–10.5)
CHLORIDE SERPL-SCNC: 99 MMOL/L — SIGNIFICANT CHANGE UP (ref 96–108)
CHOLEST SERPL-MCNC: 119 MG/DL — SIGNIFICANT CHANGE UP (ref 10–199)
CO2 SERPL-SCNC: 24 MMOL/L — SIGNIFICANT CHANGE UP (ref 22–31)
CREAT SERPL-MCNC: 1.2 MG/DL — SIGNIFICANT CHANGE UP (ref 0.5–1.3)
GLUCOSE SERPL-MCNC: 192 MG/DL — HIGH (ref 70–99)
HDLC SERPL-MCNC: 38 MG/DL — LOW (ref 40–125)
LIPID PNL WITH DIRECT LDL SERPL: 56 MG/DL — SIGNIFICANT CHANGE UP
POTASSIUM SERPL-MCNC: 4.2 MMOL/L — SIGNIFICANT CHANGE UP (ref 3.5–5.3)
POTASSIUM SERPL-SCNC: 4.2 MMOL/L — SIGNIFICANT CHANGE UP (ref 3.5–5.3)
SODIUM SERPL-SCNC: 135 MMOL/L — SIGNIFICANT CHANGE UP (ref 135–145)
TOTAL CHOLESTEROL/HDL RATIO MEASUREMENT: 3.1 RATIO — LOW (ref 3.4–9.6)
TRIGL SERPL-MCNC: 127 MG/DL — SIGNIFICANT CHANGE UP (ref 10–149)

## 2017-08-11 PROCEDURE — 93010 ELECTROCARDIOGRAM REPORT: CPT

## 2017-08-11 PROCEDURE — 99222 1ST HOSP IP/OBS MODERATE 55: CPT

## 2017-08-11 RX ORDER — GABAPENTIN 400 MG/1
100 CAPSULE ORAL EVERY 6 HOURS
Qty: 0 | Refills: 0 | Status: DISCONTINUED | OUTPATIENT
Start: 2017-08-11 | End: 2017-08-12

## 2017-08-11 RX ORDER — METFORMIN HYDROCHLORIDE 850 MG/1
1000 TABLET ORAL
Qty: 0 | Refills: 0 | Status: DISCONTINUED | OUTPATIENT
Start: 2017-08-11 | End: 2017-08-11

## 2017-08-11 RX ORDER — AMLODIPINE BESYLATE 2.5 MG/1
5 TABLET ORAL DAILY
Qty: 0 | Refills: 0 | Status: DISCONTINUED | OUTPATIENT
Start: 2017-08-11 | End: 2017-08-12

## 2017-08-11 RX ORDER — LOPERAMIDE HCL 2 MG
2 TABLET ORAL EVERY 8 HOURS
Qty: 0 | Refills: 0 | Status: DISCONTINUED | OUTPATIENT
Start: 2017-08-11 | End: 2017-08-21

## 2017-08-11 RX ORDER — METFORMIN HYDROCHLORIDE 850 MG/1
500 TABLET ORAL
Qty: 0 | Refills: 0 | Status: DISCONTINUED | OUTPATIENT
Start: 2017-08-11 | End: 2017-08-15

## 2017-08-11 RX ADMIN — Medication 50 MILLIGRAM(S): at 07:51

## 2017-08-11 RX ADMIN — AMLODIPINE BESYLATE 5 MILLIGRAM(S): 2.5 TABLET ORAL at 14:04

## 2017-08-11 RX ADMIN — METFORMIN HYDROCHLORIDE 500 MILLIGRAM(S): 850 TABLET ORAL at 17:01

## 2017-08-11 RX ADMIN — Medication 2 MILLIGRAM(S): at 23:54

## 2017-08-11 RX ADMIN — Medication 2: at 17:18

## 2017-08-11 RX ADMIN — POLYETHYLENE GLYCOL 3350 17 GRAM(S): 17 POWDER, FOR SOLUTION ORAL at 11:07

## 2017-08-11 RX ADMIN — Medication 650 MILLIGRAM(S): at 22:04

## 2017-08-11 RX ADMIN — METHADONE HYDROCHLORIDE 50 MILLIGRAM(S): 40 TABLET ORAL at 11:07

## 2017-08-11 RX ADMIN — FAMOTIDINE 20 MILLIGRAM(S): 10 INJECTION INTRAVENOUS at 11:07

## 2017-08-11 RX ADMIN — SENNA PLUS 2 TABLET(S): 8.6 TABLET ORAL at 21:10

## 2017-08-11 RX ADMIN — Medication 0.5 MILLIGRAM(S): at 21:10

## 2017-08-11 RX ADMIN — QUETIAPINE FUMARATE 50 MILLIGRAM(S): 200 TABLET, FILM COATED ORAL at 21:10

## 2017-08-11 RX ADMIN — Medication 3 CAPSULE(S): at 07:45

## 2017-08-11 RX ADMIN — Medication 1 TABLET(S): at 11:07

## 2017-08-11 RX ADMIN — LOSARTAN POTASSIUM 100 MILLIGRAM(S): 100 TABLET, FILM COATED ORAL at 07:51

## 2017-08-11 RX ADMIN — Medication 1 MILLIGRAM(S): at 11:07

## 2017-08-11 RX ADMIN — FAMOTIDINE 20 MILLIGRAM(S): 10 INJECTION INTRAVENOUS at 21:10

## 2017-08-11 RX ADMIN — Medication 50 MILLIGRAM(S): at 21:10

## 2017-08-11 RX ADMIN — Medication 3 CAPSULE(S): at 17:19

## 2017-08-11 RX ADMIN — Medication 100 MILLIGRAM(S): at 11:07

## 2017-08-11 RX ADMIN — ESCITALOPRAM OXALATE 10 MILLIGRAM(S): 10 TABLET, FILM COATED ORAL at 11:09

## 2017-08-11 RX ADMIN — Medication 0.5 MILLIGRAM(S): at 14:04

## 2017-08-11 RX ADMIN — Medication 0.5 MILLIGRAM(S): at 11:07

## 2017-08-11 NOTE — BEHAVIORAL HEALTH ASSESSMENT NOTE - HPI (INCLUDE ILLNESS QUALITY, SEVERITY, DURATION, TIMING, CONTEXT, MODIFYING FACTORS, ASSOCIATED SIGNS AND SYMPTOMS)
Per ED behavioral health assessment on 08/01: "66M PPH heroin use disorder on methadone, PMH HCV, CKD, cirrhosis, BIBEMS after fall from walker, found to be agitated, admitted to medicine for evaluation.  HCT significant for small L frontal cortical contusion.  Psychiatry consulted to advise on patient's methadone regimen, management of agitation, and etiology of behavior.    Unable to interview patient as he was deeply asleep in setting of receiving methadone 210 mg one hour earlier.  Per 1:1 at bedside and patient's nurse, patient had attempted to elope twice since admission this morning.  1:1 reports patient fell asleep while eating and had to be physically moved into bed.    Per collateral obtained from patient's son in ED, patient was diagnosed with dementia one month ago and has been increasingly agitated last three weeks.  He has gone to multiple emergency rooms for agitation.  Patient last took methadone on 7/27.    Spoke to patient's son Roman at [phone].  Roman reports patient was diagnosed with dementia prior to patient's wife's death 2 years ago.  Reports patient has been becoming progressively more paranoid and forgetful and these symptoms worsened in the last two weeks.  Says patient is unable to find his way around or take care of himself.  Patient's  at methadone program has told son that patient requires higher level of care and gave son name of nursing home that takes patients on methadone.  Son plans to bring 's contact info with him today.  Son reports patient spends his days taking car service to and from methadone clinic, getting a sandwich, and then sleeping.  Reports he also takes Xanax 2 mg PO TID and Klonopin during the day prescribed by his PMD Dr. Bhanu Orozco in [place] although he is unsure how much patient is taking.  Son is unaware of alcohol or current other drug use.  Says yesterday patient left for methadone clinic at 630 am and did not return.  Son was calling ERs to look for him and has no idea how patient made it to [place].  Reports patient made a suicide attempt after his wife's passing 2 years ago via Xanax and Klonopin overdose and was hospitalized psychiatrically at [facility].    Confirmed Xanax 2 mg PO TID and Klonopin 1 mg PO daily as per Istop prescribed by Dr. Orozco."    TODAY'S ASSESSMENT: Per ED behavioral health assessment on 08/01: "66M PPH heroin use disorder on methadone, PMH HCV, CKD, cirrhosis, BIBEMS after fall from walker, found to be agitated, admitted to medicine for evaluation.  HCT significant for small L frontal cortical contusion.  Psychiatry consulted to advise on patient's methadone regimen, management of agitation, and etiology of behavior.    Unable to interview patient as he was deeply asleep in setting of receiving methadone 210 mg one hour earlier.  Per 1:1 at bedside and patient's nurse, patient had attempted to elope twice since admission this morning.  1:1 reports patient fell asleep while eating and had to be physically moved into bed.    Per collateral obtained from patient's son in ED, patient was diagnosed with dementia one month ago and has been increasingly agitated last three weeks.  He has gone to multiple emergency rooms for agitation.  Patient last took methadone on 7/27.    Spoke to patient's son Roman at [phone].  Roman reports patient was diagnosed with dementia prior to patient's wife's death 2 years ago.  Reports patient has been becoming progressively more paranoid and forgetful and these symptoms worsened in the last two weeks.  Says patient is unable to find his way around or take care of himself.  Patient's  at methadone program has told son that patient requires higher level of care and gave son name of nursing home that takes patients on methadone.  Son plans to bring 's contact info with him today.  Son reports patient spends his days taking car service to and from methadone clinic, getting a sandwich, and then sleeping.  Reports he also takes Xanax 2 mg PO TID and Klonopin during the day prescribed by his PMD Dr. Bhanu Orozco in [place] although he is unsure how much patient is taking.  Son is unaware of alcohol or current other drug use.  Says yesterday patient left for methadone clinic at 630 am and did not return.  Son was calling ERs to look for him and has no idea how patient made it to [place].  Reports patient made a suicide attempt after his wife's passing 2 years ago via Xanax and Klonopin overdose and was hospitalized psychiatrically at [facility].    Confirmed Xanax 2 mg PO TID and Klonopin 1 mg PO daily as per Istop prescribed by Dr. Orozco."    TODAY'S ASSESSMENT: Pt interviewed today by both attending and resident psychiatrist. He reports severe constant anxiety since his arrival at [facility] and the tapering of his benzodiazepine and methadone doses. Describes cause of his heightened anxiety as "the extreme change in medication". Reports current passive suicidal ideation and occasional active ideation due to the unrelenting anxiety he is experiencing but without clear plan: states "I don't have many options. Sometimes I think about bailing out." Admits to current depression and hopelessness. He reports feeling a burden to his family, states regarding his OD attempt two years ago, "I tried to step out without causing too much trauma." Reports that he has become more forgetful since his wife passed away but agrees that these symptoms have correlated with his depressed mood. Denies AH/VH, HI, paranoia, manic symptoms. Discusses long history of prescribed methadone use since his 20s. Distant history of alcohol abuse, none since acquiring liver disease. No other recent substance abuse reported. Complains of persistent phlegm in his chest and of sense of despair regarding his chronic medical conditions (cirrhosis, kidney disease, diabetes, HTN). Per ED behavioral health assessment on 08/01: "66M PPH heroin use disorder on methadone, PMH HCV, CKD, cirrhosis, BIBEMS after fall from walker, found to be agitated, admitted to medicine for evaluation.  HCT significant for small L frontal cortical contusion.  Psychiatry consulted to advise on patient's methadone regimen, management of agitation, and etiology of behavior.    Unable to interview patient as he was deeply asleep in setting of receiving methadone 210 mg one hour earlier.  Per 1:1 at bedside and patient's nurse, patient had attempted to elope twice since admission this morning.  1:1 reports patient fell asleep while eating and had to be physically moved into bed.    Per collateral obtained from patient's son in ED, patient was diagnosed with dementia one month ago and has been increasingly agitated last three weeks.  He has gone to multiple emergency rooms for agitation.  Patient last took methadone on 7/27.    Spoke to patient's son Roman at [phone].  Roman reports patient was diagnosed with dementia prior to patient's wife's death 2 years ago.  Reports patient has been becoming progressively more paranoid and forgetful and these symptoms worsened in the last two weeks.  Says patient is unable to find his way around or take care of himself.  Patient's  at methadone program has told son that patient requires higher level of care and gave son name of nursing home that takes patients on methadone.  Son plans to bring 's contact info with him today.  Son reports patient spends his days taking car service to and from methadone clinic, getting a sandwich, and then sleeping.  Reports he also takes Xanax 2 mg PO TID and Klonopin during the day prescribed by his PMD Dr. Bhanu Orozco in [place] although he is unsure how much patient is taking.  Son is unaware of alcohol or current other drug use.  Says yesterday patient left for methadone clinic at 630 am and did not return.  Son was calling ERs to look for him and has no idea how patient made it to [place].  Reports patient made a suicide attempt after his wife's passing 2 years ago via Xanax and Klonopin overdose and was hospitalized psychiatrically at [facility].    Confirmed Xanax 2 mg PO TID and Klonopin 1 mg PO daily as per Istop prescribed by Dr. Orozco."    TODAY'S ASSESSMENT: Pt interviewed today by both attending and resident psychiatrist. He reports severe constant anxiety since his arrival at [facility] and the tapering of his benzodiazepine and methadone doses. Describes cause of his heightened anxiety as "the extreme change in medication". Reports persistent Xanax use over past few years without cessation. Reports current passive suicidal ideation and occasional active ideation due to the unrelenting anxiety he is experiencing but without clear plan: states "I don't have many options. Sometimes I think about bailing out." Admits to current depression, hopelessness, and very poor appetite and low energy. He reports feeling a burden to his family, states regarding his OD attempt two years ago, "I tried to step out without causing too much trauma." Reports that he has become more forgetful since his wife passed away but agrees that these symptoms have correlated with his depressed mood. Denies current tremulousness. Denies AH/VH, HI, paranoia, manic symptoms. Discusses long history of prescribed methadone use since his 20s. Distant history of alcohol abuse, none since acquiring liver disease. No other recent substance abuse reported. Complains of persistent phlegm in his chest and of sense of despair regarding his chronic medical conditions (cirrhosis, kidney disease, diabetes, HTN).

## 2017-08-11 NOTE — BEHAVIORAL HEALTH ASSESSMENT NOTE - NSBHCHARTREVIEWVS_PSY_A_CORE FT
Vital Signs Last 24 Hrs  T(C): 37.3 (11 Aug 2017 09:53), Max: 37.3 (11 Aug 2017 09:53)  T(F): 99.2 (11 Aug 2017 09:53), Max: 99.2 (11 Aug 2017 09:53)  HR: 72 (11 Aug 2017 09:53) (68 - 80)  BP: 186/86 (11 Aug 2017 09:53) (155/80 - 189/81)  BP(mean): --  RR: 17 (11 Aug 2017 09:53) (16 - 17)  SpO2: 95% (10 Aug 2017 12:25) (95% - 95%)

## 2017-08-11 NOTE — BEHAVIORAL HEALTH ASSESSMENT NOTE - DESCRIPTION
HCV, CKD, Cirrhosis, HTN, DMII, aspiration pneumonia or R lung (resolved), toxic metabolic encephalopathy

## 2017-08-11 NOTE — BEHAVIORAL HEALTH ASSESSMENT NOTE - NSBHCONSULTMEDS_PSY_A_CORE FT
1. Would decrease methadone to 100 mg PO daily given patient's somnolence  2. Would monitor for signs/symptoms of benzodiazepine withdrawal/CIWA protocol and treat with Ativan given patient's liver dysfunction  3. Would give haldol 2 mg PO or IM q4h prn for agitation; offer PO first and if patient unwilling to take then give IM  4. Start thiamine 100 mg PO TID

## 2017-08-11 NOTE — BEHAVIORAL HEALTH ASSESSMENT NOTE - NSBHREFERDETAILS_PSY_A_CORE_FT
Heroin use disorder on Methadone Maintenance Treatment, AMS Heroin use disorder on Methadone Maintenance Treatment, also w/ SI prior to discharge from medical floor Heroin use disorder on Methadone Maintenance Treatment,  patient w/ SI prior to discharge from medical floor

## 2017-08-11 NOTE — BEHAVIORAL HEALTH ASSESSMENT NOTE - NSBHSOCIALHXDETAILSFT_PSY_A_CORE
Patient did not report legal history or trauma history or access to firearms; however interview was cut short due to patient's request (as a result of increasing anxiety) and would like to reassess once re-engaged.

## 2017-08-11 NOTE — BEHAVIORAL HEALTH ASSESSMENT NOTE - MODIFICATIONS
use Celexa depression  while reducing Klonopin and maintaining on Methadone 50mg monitor with withdrawal;

## 2017-08-11 NOTE — BEHAVIORAL HEALTH ASSESSMENT NOTE - PROBLEM SELECTOR PLAN 1
- Will continue Lexapro 10 mg po qd and Seroquel 25 mg po qam and 50 mg po qhs to address depressive and anxiety symptoms  - I/G/T therapy - with anxious features  - r/o mood disorder due to substance use/withdrawal   - Will continue Lexapro 10 mg po qd and Seroquel 25 mg po qam and 50 mg po qhs to address depressive and anxiety symptoms  - awaiting nutrition recommendations for poor appetite  - I/G/T therapy

## 2017-08-11 NOTE — BEHAVIORAL HEALTH ASSESSMENT NOTE - NSBHCONSULTRECOMMENDOTHER_PSY_A_CORE FT
5. Check B12/folate/RPR/TSH for reversible causes of dementia  6. Would advise repeat head CT within 24 hours to monitor cortical contusion  7. Utox  8. Collateral from patient's methadone clinic

## 2017-08-11 NOTE — BEHAVIORAL HEALTH ASSESSMENT NOTE - NSBHCHARTREVIEWLAB_PSY_A_CORE FT
11.2   8.1   )-----------( 218      ( 01 Aug 2017 02:59 )             31.6   08-01    138  |  102  |  30<H>  ----------------------------<  213<H>  4.4   |  28  |  1.62<H>    Ca    9.8      01 Aug 2017 02:59  Mg     1.3     08-01    TPro  7.6  /  Alb  3.2<L>  /  TBili  1.8<H>  /  DBili  x   /  AST  82<H>  /  ALT  87<H>  /  AlkPhos  56  08-01    alcohol level negative Hb/Hct low since admission but improving: Hb/Hct on 08/09 11.8/34/1 (on 08/03 9.3/27.3); platelet count WNL; coags 08/03 WNL  08/07: Utox positive for methadone  08/03 Vitamin B12 1589, Folate 15.0  AST/ALT 63/52  RPR negative  08/02: HbA1C 6.2  08/02: TSH 0.549    CBC 08/09   WBC Count 6.8 K/uL  Range: 3.8 - 10.5 K/uL    RBC Count 3.75 M/uL (Low)  Range: 4.20 - 5.80 M/uL    Hemoglobin 11.8 g/dL (Low)  Range: 13.0 - 17.0 g/dL    Hematocrit 34.1 % (Low)  Range: 39.0 - 50.0 %    Mean Cell Volume 90.9 fL  Range: 80.0 - 100.0 fL    Mean Cell Hemoglobin 31.5 pg  Range: 27.0 - 34.0 pg    Mean Cell Hemoglobin Conc 34.6 g/dL  Range: 32.0 - 36.0 g/dL    Red Cell Distrib Width 14.0 %  Range: 10.3 - 16.9 %    Platelet Count - Automated 240 K/uL  Range: 150 - 400 K/uL      BMP 08/09      Sodium, Serum 130 mmol/L (Low)  Range: 135 - 145 mmol/L    Potassium, Serum 4.2 mmol/L  Range: 3.5 - 5.3 mmol/L    Chloride, Serum 93 mmol/L (Low)  Range: 96 - 108 mmol/L    Carbon Dioxide, Serum 24 mmol/L  Range: 22 - 31 mmol/L    Anion Gap, Serum 13 mmol/L  Range: 5 - 17 mmol/L    Blood Urea Nitrogen, Serum 22 mg/dL  Range: 7 - 23 mg/dL    Creatinine, Serum 1.10 mg/dL  Range: 0.50 - 1.30 mg/dL    Glucose, Serum 139 mg/dL (High)  Range: 70 - 99 mg/dL    Calcium, Total Serum 9.4 mg/dL  Range: 8.4 - 10.5 mg/dL    eGFR if Non African American 70 mL/min/1.73M2       11.2   8.1   )-----------( 218      ( 01 Aug 2017 02:59 )             31.6   08-01    138  |  102  |  30<H>  ----------------------------<  213<H>  4.4   |  28  |  1.62<H>    Ca    9.8      01 Aug 2017 02:59  Mg     1.3     08-01    TPro  7.6  /  Alb  3.2<L>  /  TBili  1.8<H>  /  DBili  x   /  AST  82<H>  /  ALT  87<H>  /  AlkPhos  56  08-01    alcohol level negative

## 2017-08-11 NOTE — BEHAVIORAL HEALTH ASSESSMENT NOTE - NSBHCHARTREVIEWIMAGING_PSY_A_CORE FT
08/01   head CT: minor L frontal lobe contusion 08/03  head CT no contrast:   FINDINGS:    VENTRICLES AND SULCI: There is enlargement of the sulci, ventricles and   cisterns compatible with mild diffuse parenchymal volume loss.  INTRA-AXIAL: No transcortical infarct or hemorrhage. The gray-white   differentiation is preserved. There is no mass effect or midline shift.   There is minimal periventricular white matter lucency suggesting small   vessel ischemic disease.  EXTRA-AXIAL: No extra-axial fluid collection is present.   VISUALIZED SINUSES: No air-fluid levels are identified.   VISUALIZED MASTOIDS: Well developed and aerated bilaterally.  CALVARIUM: No calvarial fracture.    IMPRESSION: No acute intracranial hemorrhage or calvarial fracture. The   previously perceived hyperdensity in the left frontal lobe is felt to be   artifactual.      08/01   head CT: minor L frontal lobe contusion

## 2017-08-11 NOTE — BEHAVIORAL HEALTH ASSESSMENT NOTE - PROBLEM SELECTOR PLAN 2
- consider also psuedodementia of depression. - consider also psuedodementia of depression.  - continue lower dose Methadone 50 mg qd and low dose Klonopin 0.5 mg po TID  - Benzodiazepine withdrawal precautions/CIWA protocol  - consider Clonidine for outpatient treatment of symptoms of opiate withdrawal

## 2017-08-11 NOTE — BEHAVIORAL HEALTH ASSESSMENT NOTE - NSBHADMITIPSTRENGTH_PSY_A_CORE
Has supportive interpersonal relationships with family, friends or peers/Cooperative with treatment/Has access to housing/residential stability

## 2017-08-11 NOTE — BEHAVIORAL HEALTH ASSESSMENT NOTE - NSBHADMITCOUNSEL_PSY_A_CORE
risks and benefits of treatment options/risk factor reduction/instructions for management, treatment and follow up/diagnostic results/impressions and/or recommended studies

## 2017-08-11 NOTE — BEHAVIORAL HEALTH ASSESSMENT NOTE - OTHER PAST PSYCHIATRIC HISTORY (INCLUDE DETAILS REGARDING ONSET, COURSE OF ILLNESS, INPATIENT/OUTPATIENT TREATMENT)
See collateral from son above.  Not currently in psychiatric treatment. See collateral from son in ED HPI above.  Not currently in psychiatric treatment. See collateral from son in ED HPI above.  Not currently in psychiatric treatment. Reported dementia diagnosis.

## 2017-08-11 NOTE — BEHAVIORAL HEALTH ASSESSMENT NOTE - DESCRIPTION (FIRST USE, LAST USE, QUANTITY, FREQUENCY, DURATION)
On methadone pt reports distant history of alcohol abuse; states he has stopped using since his liver function deteriorated pt reports distant history of benzodiazepine abuse; reports recently over past several years only used as prescribed

## 2017-08-11 NOTE — BEHAVIORAL HEALTH ASSESSMENT NOTE - NSBHADMBHPROVCNTCTNOFT_PSY_A_CORE
Pt is transfer from medical floor and was followed by c/l psychiatry service; reason for admission and progress is well documented

## 2017-08-11 NOTE — BEHAVIORAL HEALTH ASSESSMENT NOTE - CASE SUMMARY
see above  ;; 8/11: alert but irritable and focused on medications; oriented reg 3/3 recall 3/3 ; fund of knowledge fair; speech fair quality; suspcious; irritable ; avoidant ; affect thought congruent; poor I&J; sad depressed and despairing but denies SI or HI or AVH.

## 2017-08-11 NOTE — BEHAVIORAL HEALTH ASSESSMENT NOTE - ADDITIONAL DETAILS / COMMENTS
Pt showed increasing interest and self-disclosure as interview proceeded; however interview was finished prematurely due to patient's persistent anxiety.

## 2017-08-11 NOTE — BEHAVIORAL HEALTH ASSESSMENT NOTE - PAST PSYCHOTROPIC MEDICATION
history of benzodiazepine use  no reported history of other antidepressant use, although patient discontinued interview at early stage due to anxiety

## 2017-08-11 NOTE — BEHAVIORAL HEALTH ASSESSMENT NOTE - DIFFERENTIAL
Delirium vs. TBI vs. Alzheimers dementia vs. vascular dementia toxic metabolic encephalopathy vs. pseudodementia of depression vs primary dementia process major depressive disorder w/ comorbid anxiety vs substance induced mood disorder (?excessive use of benzodiazepenes)    toxic metabolic encephalopathy vs. pseudodementia of depression vs primary dementia process

## 2017-08-11 NOTE — BEHAVIORAL HEALTH ASSESSMENT NOTE - SUMMARY
66M history of heroin use disorder on methadone and reported diagnosis of dementia, one prior psychiatric admission for SA via OD, PMH CKD, HCV, cirrhosis, presenting with declining cognitive status and agitation.  Labs and imaging not clearly indicative of etiology of patient's presentation; labs significant for mild renal dysfunction and liver dysfunction and HCT significant for minor frontal lobe contusion.  Alcohol level negative.  Based on son's collateral patient's functional status has been gradually declining indicative of dementing process.  Would decrease methadone to 100 mg PO daily as patient notably sedated following dose and can give prn haldol as below for agitation.  Patient is at risk for benzodiazepine withdrawal although unclear how much patient has been taking of Xanax and Klonopin.  Would monitor via CIWA and treat signs of withdrawal >8 with Ativan given liver disease.  Would monitor for progression of contusion and check utox and reversible causes of dementia as below.    Istop [id number] Per ED behavioral health on 08/01: "66M history of heroin use disorder on methadone and reported diagnosis of dementia, one prior psychiatric admission for SA via OD, PMH CKD, HCV, cirrhosis, presenting with declining cognitive status and agitation.  Labs and imaging not clearly indicative of etiology of patient's presentation; labs significant for mild renal dysfunction and liver dysfunction and HCT significant for minor frontal lobe contusion.  Alcohol level negative.  Based on son's collateral patient's functional status has been gradually declining indicative of dementing process.  Would decrease methadone to 100 mg PO daily as patient notably sedated following dose and can give prn haldol as below for agitation.  Patient is at risk for benzodiazepine withdrawal although unclear how much patient has been taking of Xanax and Klonopin.  Would monitor via CIWA and treat signs of withdrawal >8 with Ativan given liver disease.  Would monitor for progression of contusion and check utox and reversible causes of dementia as below.    Istop [id number]"    66M history of heroin use disorder on methadone and reported diagnosis of dementia, one prior psychiatric admission for SA via OD, PMH CKD, HCV, cirrhosis, HTN, DMII presenting from medicine floor at [facility] following treatment for aspiration pneumonia and brought to psychiatry after expressing SI in context of worsening anxiety s/p benzo and methadone tapering. Per ED behavioral health on 08/01: "66M history of heroin use disorder on methadone and reported diagnosis of dementia, one prior psychiatric admission for SA via OD, PMH CKD, HCV, cirrhosis, presenting with declining cognitive status and agitation.  Labs and imaging not clearly indicative of etiology of patient's presentation; labs significant for mild renal dysfunction and liver dysfunction and HCT significant for minor frontal lobe contusion.  Alcohol level negative.  Based on son's collateral patient's functional status has been gradually declining indicative of dementing process.  Would decrease methadone to 100 mg PO daily as patient notably sedated following dose and can give prn haldol as below for agitation.  Patient is at risk for benzodiazepine withdrawal although unclear how much patient has been taking of Xanax and Klonopin.  Would monitor via CIWA and treat signs of withdrawal >8 with Ativan given liver disease.  Would monitor for progression of contusion and check utox and reversible causes of dementia as below.    Istop [id number]"    TODAY'S FORMULATION:   66M history of heroin use disorder on methadone and reported diagnosis of dementia, one prior psychiatric admission for SA via OD, PMH CKD, HCV, cirrhosis, HTN, DMII presenting from medicine floor at [facility] following treatment for aspiration pneumonia and brought to psychiatry after expressing SI in context of worsening anxiety s/p benzo and methadone tapering. Pt endorses persistent severe anxiety since tapering which per patient precipitated his suicidal ideation. The source of patient's cognitive dysfunction (reported dementia diagnosis and increased forgetfulness/disorientation) prior to arrival is unclear and may be attributed to effects of high dose methadone (220mg) and chronic Xanax and Klonopin. Uncertain based on current preliminary assessment (patient is AAOx3, no apparent paranoia, delusions or forgetfulness; some cognitive slowing that may be attributed to depression) if there is any primary dementia process. Must also consider pseudodementia of depression as patient's cognitive decline coincides with onset of his depression following wife's passing and has not been effectively treated up until this time. Agree with continuing low dose Klonopin due to risk of benzo withdrawal. Will continue Lexapro for treatment of depression. Expressing severe passive and some active suicidal ideation and requires stabilization and medication management on psychiatric unit to mitigate acute risk of self-harm. Per ED behavioral health on 08/01: "66M history of heroin use disorder on methadone and reported diagnosis of dementia, one prior psychiatric admission for SA via OD, PMH CKD, HCV, cirrhosis, presenting with declining cognitive status and agitation.  Labs and imaging not clearly indicative of etiology of patient's presentation; labs significant for mild renal dysfunction and liver dysfunction and HCT significant for minor frontal lobe contusion.  Alcohol level negative.  Based on son's collateral patient's functional status has been gradually declining indicative of dementing process.  Would decrease methadone to 100 mg PO daily as patient notably sedated following dose and can give prn haldol as below for agitation.  Patient is at risk for benzodiazepine withdrawal although unclear how much patient has been taking of Xanax and Klonopin.  Would monitor via CIWA and treat signs of withdrawal >8 with Ativan given liver disease.  Would monitor for progression of contusion and check utox and reversible causes of dementia as below.    Istop [id number]"    TODAY'S FORMULATION:  66M PPH heroin use disorder on methadone, one prior psychiatric admission for suicide attempt via OD following wife's death, on prescribed benzodiazepines by PMD, unclear prior diagnosis of dementia, presenting to [facility] after wandering from home and treated for presumed aspiration PNA on medicine floor, then transferred to psychiatric unit for suicidal ideation in context of increasing anxiety due to tapering of methadone and longstanding benzodiazepine use.  Patient this week appearing alert indicating patient's presentation on admission was largely in setting of overmedication on methadone and benzodiazepines, with potential overlying delirium 2/2 PNA.       Pt endorses persistent severe anxiety since tapering which per patient precipitated his suicidal ideation. The source of patient's cognitive dysfunction (reported dementia diagnosis and increased forgetfulness/disorientation) prior to arrival is unclear and may be attributed to effects of high dose methadone (220mg) and chronic Xanax and Klonopin. Uncertain based on current preliminary assessment (patient is AAOx3, no apparent paranoia, delusions or forgetfulness; some cognitive slowing that may be attributed to depression) if there is any primary dementia process. Must also consider pseudodementia of depression as patient's cognitive decline coincides with onset of his depression following wife's passing and has not been effectively treated up until this time. Agree with continuing low dose Klonopin due to risk of benzo withdrawal. Will continue Lexapro and Seroquel for treatment of depression. Agree with considering home clonidine as treatment for potential opiate withdrawal/hypertension. Nutrition consulted given patient's poor appetite and thinning body habitus. Will consider PT consult due to patient's difficulty walking. Dispo will be assessed accordingly. Expressing severe passive and some active suicidal ideation at this time and requires stabilization and medication management on psychiatric unit to mitigate acute risk of self-harm.    Medicine is following for management of chronic medical conditions. Pt recently completed antibiotic course for aspiration pneumonia.

## 2017-08-11 NOTE — BEHAVIORAL HEALTH ASSESSMENT NOTE - RISK ASSESSMENT
Fixed: Male, age, history of substance abuse, history of suicide attempt, chronic medical conditions  Modifiable: Depression amenable to medication and therapy;   Protective: Engaged with treatment; supportive family; residential stability;     Pt is at chronic elevated risk of self-harm given past history of substance abuse and suicide attempt as well as demographic factors; currently displaying severe anxiety in context of benzo and methadone tapering as well as cognitive dysfunction in setting of possible dementia process?/pseudodementia of depression? and toxic metabolic encephalopathy. Expressing severe passive suicidal ideation and requires stabilization and medication management on psychiatric unit to mitigate acute risk of self-harm.

## 2017-08-12 DIAGNOSIS — F11.20 OPIOID DEPENDENCE, UNCOMPLICATED: ICD-10-CM

## 2017-08-12 PROCEDURE — 99231 SBSQ HOSP IP/OBS SF/LOW 25: CPT

## 2017-08-12 RX ORDER — GABAPENTIN 400 MG/1
300 CAPSULE ORAL ONCE
Qty: 0 | Refills: 0 | Status: COMPLETED | OUTPATIENT
Start: 2017-08-12 | End: 2017-08-12

## 2017-08-12 RX ORDER — LOSARTAN POTASSIUM 100 MG/1
100 TABLET, FILM COATED ORAL DAILY
Qty: 0 | Refills: 0 | Status: DISCONTINUED | OUTPATIENT
Start: 2017-08-13 | End: 2017-08-15

## 2017-08-12 RX ORDER — GABAPENTIN 400 MG/1
300 CAPSULE ORAL THREE TIMES A DAY
Qty: 0 | Refills: 0 | Status: DISCONTINUED | OUTPATIENT
Start: 2017-08-12 | End: 2017-08-21

## 2017-08-12 RX ORDER — METOPROLOL TARTRATE 50 MG
50 TABLET ORAL EVERY 12 HOURS
Qty: 0 | Refills: 0 | Status: DISCONTINUED | OUTPATIENT
Start: 2017-08-12 | End: 2017-08-21

## 2017-08-12 RX ORDER — AMLODIPINE BESYLATE 2.5 MG/1
2.5 TABLET ORAL DAILY
Qty: 0 | Refills: 0 | Status: DISCONTINUED | OUTPATIENT
Start: 2017-08-13 | End: 2017-08-14

## 2017-08-12 RX ORDER — LOSARTAN POTASSIUM 100 MG/1
100 TABLET, FILM COATED ORAL DAILY
Qty: 0 | Refills: 0 | Status: DISCONTINUED | OUTPATIENT
Start: 2017-08-12 | End: 2017-08-12

## 2017-08-12 RX ADMIN — Medication 0.5 MILLIGRAM(S): at 15:17

## 2017-08-12 RX ADMIN — Medication 50 MILLIGRAM(S): at 21:47

## 2017-08-12 RX ADMIN — ESCITALOPRAM OXALATE 10 MILLIGRAM(S): 10 TABLET, FILM COATED ORAL at 09:58

## 2017-08-12 RX ADMIN — GABAPENTIN 300 MILLIGRAM(S): 400 CAPSULE ORAL at 21:47

## 2017-08-12 RX ADMIN — Medication 0.5 MILLIGRAM(S): at 09:57

## 2017-08-12 RX ADMIN — FAMOTIDINE 20 MILLIGRAM(S): 10 INJECTION INTRAVENOUS at 21:47

## 2017-08-12 RX ADMIN — METFORMIN HYDROCHLORIDE 500 MILLIGRAM(S): 850 TABLET ORAL at 13:45

## 2017-08-12 RX ADMIN — Medication 1 TABLET(S): at 09:58

## 2017-08-12 RX ADMIN — Medication 100 MILLIGRAM(S): at 18:43

## 2017-08-12 RX ADMIN — AMLODIPINE BESYLATE 5 MILLIGRAM(S): 2.5 TABLET ORAL at 09:58

## 2017-08-12 RX ADMIN — GABAPENTIN 300 MILLIGRAM(S): 400 CAPSULE ORAL at 11:36

## 2017-08-12 RX ADMIN — Medication 2: at 21:52

## 2017-08-12 RX ADMIN — METHADONE HYDROCHLORIDE 50 MILLIGRAM(S): 40 TABLET ORAL at 09:57

## 2017-08-12 RX ADMIN — FAMOTIDINE 20 MILLIGRAM(S): 10 INJECTION INTRAVENOUS at 09:58

## 2017-08-12 RX ADMIN — Medication 3 CAPSULE(S): at 18:36

## 2017-08-12 RX ADMIN — Medication 3 CAPSULE(S): at 13:47

## 2017-08-12 RX ADMIN — QUETIAPINE FUMARATE 50 MILLIGRAM(S): 200 TABLET, FILM COATED ORAL at 21:47

## 2017-08-12 RX ADMIN — Medication 50 MILLIGRAM(S): at 09:58

## 2017-08-12 RX ADMIN — METFORMIN HYDROCHLORIDE 500 MILLIGRAM(S): 850 TABLET ORAL at 18:43

## 2017-08-12 RX ADMIN — Medication 1 MILLIGRAM(S): at 18:36

## 2017-08-12 RX ADMIN — LOSARTAN POTASSIUM 100 MILLIGRAM(S): 100 TABLET, FILM COATED ORAL at 09:58

## 2017-08-12 RX ADMIN — GABAPENTIN 300 MILLIGRAM(S): 400 CAPSULE ORAL at 15:19

## 2017-08-12 RX ADMIN — Medication 650 MILLIGRAM(S): at 04:58

## 2017-08-12 RX ADMIN — Medication 0.5 MILLIGRAM(S): at 21:47

## 2017-08-12 NOTE — PROGRESS NOTE BEHAVIORAL HEALTH - NSBHFUPINTERVALHXFT_PSY_A_CORE
66 year old unemployed on SSD 2/2 broken back approx 10 years ago and subsequent opioid dependence as well as BZD dependence (previously prescribed 220mg Methadone and 90 Xanax monthly) c/o withdrawal sxs.  He was unaware of the Gabapentin PRN previously ordered therefore this writer changed the dose to 300mg TID and will monitor symptoms.

## 2017-08-12 NOTE — PROGRESS NOTE BEHAVIORAL HEALTH - SUMMARY
66 year old male with history of broken back and subsequent opioid and BZD dependence who presents for substance use taper

## 2017-08-13 DIAGNOSIS — E86.0 DEHYDRATION: ICD-10-CM

## 2017-08-13 DIAGNOSIS — T40.3X5A ADVERSE EFFECT OF METHADONE, INITIAL ENCOUNTER: ICD-10-CM

## 2017-08-13 DIAGNOSIS — N18.9 CHRONIC KIDNEY DISEASE, UNSPECIFIED: ICD-10-CM

## 2017-08-13 DIAGNOSIS — E11.22 TYPE 2 DIABETES MELLITUS WITH DIABETIC CHRONIC KIDNEY DISEASE: ICD-10-CM

## 2017-08-13 DIAGNOSIS — J69.0 PNEUMONITIS DUE TO INHALATION OF FOOD AND VOMIT: ICD-10-CM

## 2017-08-13 DIAGNOSIS — R41.82 ALTERED MENTAL STATUS, UNSPECIFIED: ICD-10-CM

## 2017-08-13 DIAGNOSIS — K74.60 UNSPECIFIED CIRRHOSIS OF LIVER: ICD-10-CM

## 2017-08-13 DIAGNOSIS — I10 ESSENTIAL (PRIMARY) HYPERTENSION: ICD-10-CM

## 2017-08-13 DIAGNOSIS — G92 TOXIC ENCEPHALOPATHY: ICD-10-CM

## 2017-08-13 DIAGNOSIS — F05 DELIRIUM DUE TO KNOWN PHYSIOLOGICAL CONDITION: ICD-10-CM

## 2017-08-13 DIAGNOSIS — I12.9 HYPERTENSIVE CHRONIC KIDNEY DISEASE WITH STAGE 1 THROUGH STAGE 4 CHRONIC KIDNEY DISEASE, OR UNSPECIFIED CHRONIC KIDNEY DISEASE: ICD-10-CM

## 2017-08-13 DIAGNOSIS — K86.1 OTHER CHRONIC PANCREATITIS: ICD-10-CM

## 2017-08-13 DIAGNOSIS — Z91.5 PERSONAL HISTORY OF SELF-HARM: ICD-10-CM

## 2017-08-13 DIAGNOSIS — N18.3 CHRONIC KIDNEY DISEASE, STAGE 3 (MODERATE): ICD-10-CM

## 2017-08-13 DIAGNOSIS — E11.9 TYPE 2 DIABETES MELLITUS WITHOUT COMPLICATIONS: ICD-10-CM

## 2017-08-13 DIAGNOSIS — E87.1 HYPO-OSMOLALITY AND HYPONATREMIA: ICD-10-CM

## 2017-08-13 DIAGNOSIS — F32.9 MAJOR DEPRESSIVE DISORDER, SINGLE EPISODE, UNSPECIFIED: ICD-10-CM

## 2017-08-13 DIAGNOSIS — F03.90 UNSPECIFIED DEMENTIA, UNSPECIFIED SEVERITY, WITHOUT BEHAVIORAL DISTURBANCE, PSYCHOTIC DISTURBANCE, MOOD DISTURBANCE, AND ANXIETY: ICD-10-CM

## 2017-08-13 DIAGNOSIS — A41.9 SEPSIS, UNSPECIFIED ORGANISM: ICD-10-CM

## 2017-08-13 DIAGNOSIS — N17.9 ACUTE KIDNEY FAILURE, UNSPECIFIED: ICD-10-CM

## 2017-08-13 DIAGNOSIS — B19.20 UNSPECIFIED VIRAL HEPATITIS C WITHOUT HEPATIC COMA: ICD-10-CM

## 2017-08-13 DIAGNOSIS — E46 UNSPECIFIED PROTEIN-CALORIE MALNUTRITION: ICD-10-CM

## 2017-08-13 DIAGNOSIS — F11.21 OPIOID DEPENDENCE, IN REMISSION: ICD-10-CM

## 2017-08-13 RX ADMIN — Medication 1 MILLIGRAM(S): at 09:15

## 2017-08-13 RX ADMIN — Medication 3 CAPSULE(S): at 07:55

## 2017-08-13 RX ADMIN — FAMOTIDINE 20 MILLIGRAM(S): 10 INJECTION INTRAVENOUS at 09:24

## 2017-08-13 RX ADMIN — Medication 0.5 MILLIGRAM(S): at 21:13

## 2017-08-13 RX ADMIN — Medication 1 TABLET(S): at 09:16

## 2017-08-13 RX ADMIN — LOSARTAN POTASSIUM 100 MILLIGRAM(S): 100 TABLET, FILM COATED ORAL at 09:15

## 2017-08-13 RX ADMIN — METHADONE HYDROCHLORIDE 50 MILLIGRAM(S): 40 TABLET ORAL at 09:15

## 2017-08-13 RX ADMIN — Medication 0.5 MILLIGRAM(S): at 09:16

## 2017-08-13 RX ADMIN — Medication 3 CAPSULE(S): at 10:02

## 2017-08-13 RX ADMIN — GABAPENTIN 300 MILLIGRAM(S): 400 CAPSULE ORAL at 21:08

## 2017-08-13 RX ADMIN — Medication 3 CAPSULE(S): at 16:07

## 2017-08-13 RX ADMIN — Medication 650 MILLIGRAM(S): at 09:16

## 2017-08-13 RX ADMIN — GABAPENTIN 300 MILLIGRAM(S): 400 CAPSULE ORAL at 12:08

## 2017-08-13 RX ADMIN — METFORMIN HYDROCHLORIDE 500 MILLIGRAM(S): 850 TABLET ORAL at 07:55

## 2017-08-13 RX ADMIN — METFORMIN HYDROCHLORIDE 500 MILLIGRAM(S): 850 TABLET ORAL at 16:07

## 2017-08-13 RX ADMIN — QUETIAPINE FUMARATE 50 MILLIGRAM(S): 200 TABLET, FILM COATED ORAL at 21:08

## 2017-08-13 RX ADMIN — Medication 50 MILLIGRAM(S): at 09:24

## 2017-08-13 RX ADMIN — FAMOTIDINE 20 MILLIGRAM(S): 10 INJECTION INTRAVENOUS at 21:08

## 2017-08-13 RX ADMIN — AMLODIPINE BESYLATE 2.5 MILLIGRAM(S): 2.5 TABLET ORAL at 09:15

## 2017-08-13 RX ADMIN — Medication 0.5 MILLIGRAM(S): at 12:07

## 2017-08-13 RX ADMIN — Medication 2: at 21:11

## 2017-08-13 RX ADMIN — Medication 100 MILLIGRAM(S): at 09:16

## 2017-08-13 RX ADMIN — GABAPENTIN 300 MILLIGRAM(S): 400 CAPSULE ORAL at 09:15

## 2017-08-13 RX ADMIN — ESCITALOPRAM OXALATE 10 MILLIGRAM(S): 10 TABLET, FILM COATED ORAL at 09:24

## 2017-08-13 RX ADMIN — Medication: at 11:21

## 2017-08-14 PROCEDURE — 99233 SBSQ HOSP IP/OBS HIGH 50: CPT | Mod: GC

## 2017-08-14 RX ORDER — QUETIAPINE FUMARATE 200 MG/1
100 TABLET, FILM COATED ORAL AT BEDTIME
Qty: 0 | Refills: 0 | Status: DISCONTINUED | OUTPATIENT
Start: 2017-08-14 | End: 2017-08-17

## 2017-08-14 RX ORDER — ESCITALOPRAM OXALATE 10 MG/1
10 TABLET, FILM COATED ORAL DAILY
Qty: 0 | Refills: 0 | Status: DISCONTINUED | OUTPATIENT
Start: 2017-08-14 | End: 2017-08-21

## 2017-08-14 RX ORDER — ESCITALOPRAM OXALATE 10 MG/1
20 TABLET, FILM COATED ORAL DAILY
Qty: 0 | Refills: 0 | Status: DISCONTINUED | OUTPATIENT
Start: 2017-08-14 | End: 2017-08-14

## 2017-08-14 RX ADMIN — METFORMIN HYDROCHLORIDE 500 MILLIGRAM(S): 850 TABLET ORAL at 16:19

## 2017-08-14 RX ADMIN — LOSARTAN POTASSIUM 100 MILLIGRAM(S): 100 TABLET, FILM COATED ORAL at 11:34

## 2017-08-14 RX ADMIN — FAMOTIDINE 20 MILLIGRAM(S): 10 INJECTION INTRAVENOUS at 11:31

## 2017-08-14 RX ADMIN — Medication 50 MILLIGRAM(S): at 22:15

## 2017-08-14 RX ADMIN — Medication 2: at 22:15

## 2017-08-14 RX ADMIN — FAMOTIDINE 20 MILLIGRAM(S): 10 INJECTION INTRAVENOUS at 22:15

## 2017-08-14 RX ADMIN — Medication 2: at 16:21

## 2017-08-14 RX ADMIN — QUETIAPINE FUMARATE 100 MILLIGRAM(S): 200 TABLET, FILM COATED ORAL at 22:15

## 2017-08-14 RX ADMIN — Medication 0.5 MILLIGRAM(S): at 11:35

## 2017-08-14 RX ADMIN — METHADONE HYDROCHLORIDE 50 MILLIGRAM(S): 40 TABLET ORAL at 11:21

## 2017-08-14 RX ADMIN — Medication 3 CAPSULE(S): at 07:41

## 2017-08-14 RX ADMIN — Medication 650 MILLIGRAM(S): at 22:20

## 2017-08-14 RX ADMIN — GABAPENTIN 300 MILLIGRAM(S): 400 CAPSULE ORAL at 22:15

## 2017-08-14 RX ADMIN — Medication 100 MILLIGRAM(S): at 11:30

## 2017-08-14 RX ADMIN — Medication 0.5 MILLIGRAM(S): at 16:16

## 2017-08-14 RX ADMIN — AMLODIPINE BESYLATE 2.5 MILLIGRAM(S): 2.5 TABLET ORAL at 11:33

## 2017-08-14 RX ADMIN — Medication 1 TABLET(S): at 11:29

## 2017-08-14 RX ADMIN — Medication 3 CAPSULE(S): at 11:35

## 2017-08-14 RX ADMIN — METFORMIN HYDROCHLORIDE 500 MILLIGRAM(S): 850 TABLET ORAL at 07:41

## 2017-08-14 RX ADMIN — GABAPENTIN 300 MILLIGRAM(S): 400 CAPSULE ORAL at 16:15

## 2017-08-14 RX ADMIN — Medication 3 CAPSULE(S): at 16:18

## 2017-08-14 RX ADMIN — Medication 0.5 MILLIGRAM(S): at 22:15

## 2017-08-14 RX ADMIN — Medication 1 MILLIGRAM(S): at 11:35

## 2017-08-14 RX ADMIN — ESCITALOPRAM OXALATE 10 MILLIGRAM(S): 10 TABLET, FILM COATED ORAL at 11:33

## 2017-08-14 RX ADMIN — GABAPENTIN 300 MILLIGRAM(S): 400 CAPSULE ORAL at 11:32

## 2017-08-14 RX ADMIN — Medication 50 MILLIGRAM(S): at 11:34

## 2017-08-14 NOTE — PROGRESS NOTE BEHAVIORAL HEALTH - NSBHFUPINTERVALHXFT_PSY_A_CORE
Pt interviewed this morning at bedside. He reports feeling anxious, weaker and "more cloudy". Reports persistent passive suicidal ideation without clear plan. States that he struggles to talk, grows tired easily. Spoke with his son over weekend but did not want him to visit in his current state. Reports a desire to eat better, states he had eggs and oatmeal in the morning. Is requesting a liquid shake similar to Ensure without sugar that nutrition recommended.     He was started by weekend covering psychiatrist on Gabapentin 300 mg po TID for anxiety.   Repeat EKG w/ qtc 452 (was 483).

## 2017-08-14 NOTE — PROGRESS NOTE BEHAVIORAL HEALTH - SUMMARY
66M PPH heroin use disorder on methadone, one prior psychiatric admission for suicide attempt via OD following wife's death, on prescribed benzodiazepines by PMD, unclear prior diagnosis of dementia, presenting to [facility] after wandering from home and treated for presumed aspiration PNA on medicine floor, then transferred to psychiatric unit for suicidal ideation in context of increasing anxiety due to tapering of methadone and longstanding benzodiazepine use.  Patient this week appearing alert indicating patient's presentation on admission was largely in setting of overmedication on methadone and benzodiazepines, with potential overlying delirium 2/2 PNA.       Pt endorses persistent severe anxiety since tapering which per patient precipitated his suicidal ideation. The source of patient's cognitive dysfunction (reported dementia diagnosis and increased forgetfulness/disorientation) prior to arrival is unclear and may be attributed to effects of high dose methadone (220mg) and chronic Xanax and Klonopin. Uncertain based on current preliminary assessment (patient is AAOx3, no apparent paranoia, delusions or forgetfulness; some cognitive slowing that may be attributed to depression) if there is any primary dementia process. Must also consider pseudodementia of depression as patient's cognitive decline coincides with onset of his depression following wife's passing and has not been effectively treated up until this time. Agree with continuing low dose Klonopin due to risk of benzo withdrawal. Will continue Lexapro and Seroquel for treatment of depression. Agree with considering home clonidine as treatment for potential opiate withdrawal/hypertension. Nutrition consulted given patient's poor appetite and thinning body habitus. Will consider PT consult due to patient's difficulty walking. Dispo will be assessed accordingly. Expressing severe passive and some active suicidal ideation at this time and requires stabilization and medication management on psychiatric unit to mitigate acute risk of self-harm.    Medicine is following for management of chronic medical conditions. Pt recently completed antibiotic course for aspiration pneumonia.     Differential: major depressive disorder w/ comorbid anxiety vs substance induced mood disorder (?excessive use of benzodiazepenes)    toxic metabolic encephalopathy vs. pseudodementia of depression vs primary dementia process.     08/14/17: pt appears weaker; weekend covering psychiatrist started on 300 mg po TID Neurontin for anxiety; consulting w/ nutrition regarding possible liquid shake supplements -- pt has unintentional weight loss contributing to low energy and mood

## 2017-08-15 PROCEDURE — 99233 SBSQ HOSP IP/OBS HIGH 50: CPT | Mod: GC

## 2017-08-15 RX ORDER — LOSARTAN POTASSIUM 100 MG/1
50 TABLET, FILM COATED ORAL DAILY
Qty: 0 | Refills: 0 | Status: DISCONTINUED | OUTPATIENT
Start: 2017-08-16 | End: 2017-08-17

## 2017-08-15 RX ORDER — METHADONE HYDROCHLORIDE 40 MG/1
50 TABLET ORAL DAILY
Qty: 0 | Refills: 0 | Status: DISCONTINUED | OUTPATIENT
Start: 2017-08-15 | End: 2017-08-21

## 2017-08-15 RX ORDER — METFORMIN HYDROCHLORIDE 850 MG/1
1000 TABLET ORAL
Qty: 0 | Refills: 0 | Status: DISCONTINUED | OUTPATIENT
Start: 2017-08-15 | End: 2017-08-21

## 2017-08-15 RX ORDER — CLONAZEPAM 1 MG
0.5 TABLET ORAL THREE TIMES A DAY
Qty: 0 | Refills: 0 | Status: DISCONTINUED | OUTPATIENT
Start: 2017-08-15 | End: 2017-08-21

## 2017-08-15 RX ADMIN — METHADONE HYDROCHLORIDE 50 MILLIGRAM(S): 40 TABLET ORAL at 10:52

## 2017-08-15 RX ADMIN — QUETIAPINE FUMARATE 100 MILLIGRAM(S): 200 TABLET, FILM COATED ORAL at 21:54

## 2017-08-15 RX ADMIN — Medication 2: at 11:54

## 2017-08-15 RX ADMIN — Medication 1 TABLET(S): at 10:52

## 2017-08-15 RX ADMIN — ESCITALOPRAM OXALATE 10 MILLIGRAM(S): 10 TABLET, FILM COATED ORAL at 10:51

## 2017-08-15 RX ADMIN — Medication 3 CAPSULE(S): at 17:14

## 2017-08-15 RX ADMIN — METFORMIN HYDROCHLORIDE 500 MILLIGRAM(S): 850 TABLET ORAL at 07:52

## 2017-08-15 RX ADMIN — Medication 3 CAPSULE(S): at 11:56

## 2017-08-15 RX ADMIN — Medication 1 MILLIGRAM(S): at 10:51

## 2017-08-15 RX ADMIN — FAMOTIDINE 20 MILLIGRAM(S): 10 INJECTION INTRAVENOUS at 21:54

## 2017-08-15 RX ADMIN — Medication 0.5 MILLIGRAM(S): at 21:54

## 2017-08-15 RX ADMIN — LOSARTAN POTASSIUM 100 MILLIGRAM(S): 100 TABLET, FILM COATED ORAL at 10:52

## 2017-08-15 RX ADMIN — Medication 4: at 17:10

## 2017-08-15 RX ADMIN — Medication 50 MILLIGRAM(S): at 21:54

## 2017-08-15 RX ADMIN — Medication 3 CAPSULE(S): at 07:53

## 2017-08-15 RX ADMIN — GABAPENTIN 300 MILLIGRAM(S): 400 CAPSULE ORAL at 17:11

## 2017-08-15 RX ADMIN — METFORMIN HYDROCHLORIDE 1000 MILLIGRAM(S): 850 TABLET ORAL at 17:11

## 2017-08-15 RX ADMIN — GABAPENTIN 300 MILLIGRAM(S): 400 CAPSULE ORAL at 10:52

## 2017-08-15 RX ADMIN — Medication 0.5 MILLIGRAM(S): at 10:51

## 2017-08-15 RX ADMIN — FAMOTIDINE 20 MILLIGRAM(S): 10 INJECTION INTRAVENOUS at 10:51

## 2017-08-15 RX ADMIN — Medication 100 MILLIGRAM(S): at 10:52

## 2017-08-15 RX ADMIN — Medication 50 MILLIGRAM(S): at 10:52

## 2017-08-15 RX ADMIN — GABAPENTIN 300 MILLIGRAM(S): 400 CAPSULE ORAL at 21:54

## 2017-08-15 RX ADMIN — Medication 0.5 MILLIGRAM(S): at 17:11

## 2017-08-15 NOTE — PROGRESS NOTE BEHAVIORAL HEALTH - NSBHFUPSUICINTERVALFT_PSY_A_CORE
Unclear active SI; pt too weak to respond to questioning.
Unclear active SI; pt too weak to respond to questioning.

## 2017-08-15 NOTE — PROGRESS NOTE BEHAVIORAL HEALTH - SUMMARY
66M PPH heroin use disorder on methadone, one prior psychiatric admission for suicide attempt via OD following wife's death, on prescribed benzodiazepines by PMD, unclear prior diagnosis of dementia, presenting to [facility] after wandering from home and treated for presumed aspiration PNA on medicine floor, then transferred to psychiatric unit for suicidal ideation in context of increasing anxiety due to tapering of methadone and longstanding benzodiazepine use.  Patient this week appearing alert indicating patient's presentation on admission was largely in setting of overmedication on methadone and benzodiazepines, with potential overlying delirium 2/2 PNA.       Pt endorses persistent severe anxiety since tapering which per patient precipitated his suicidal ideation. The source of patient's cognitive dysfunction (reported dementia diagnosis and increased forgetfulness/disorientation) prior to arrival is unclear and may be attributed to effects of high dose methadone (220mg) and chronic Xanax and Klonopin. Uncertain based on current preliminary assessment (patient is AAOx3, no apparent paranoia, delusions or forgetfulness; some cognitive slowing that may be attributed to depression) if there is any primary dementia process. Must also consider pseudodementia of depression as patient's cognitive decline coincides with onset of his depression following wife's passing and has not been effectively treated up until this time. Agree with continuing low dose Klonopin due to risk of benzo withdrawal. Will continue Lexapro and Seroquel for treatment of depression. Agree with considering home clonidine as treatment for potential opiate withdrawal/hypertension. Nutrition consulted given patient's poor appetite and thinning body habitus. Will consider PT consult due to patient's difficulty walking. Dispo will be assessed accordingly. Expressing severe passive and some active suicidal ideation at this time and requires stabilization and medication management on psychiatric unit to mitigate acute risk of self-harm.    Medicine is following for management of chronic medical conditions. Pt recently completed antibiotic course for aspiration pneumonia.     Differential: major depressive disorder w/ comorbid anxiety vs substance induced mood disorder (?excessive use of benzodiazepenes)    toxic metabolic encephalopathy vs. pseudodementia of depression vs primary dementia process.     08/14/17: pt appears weaker; weekend covering psychiatrist started on 300 mg po TID Neurontin for anxiety; consulting w/ nutrition regarding possible liquid shake supplements -- pt has unintentional weight loss contributing to low energy and mood    8/15: patient a little more verbal and engagable when discussing idea of going home; low grade fever but no pulmonary sxs.  see by Dr. Little of medicine and appears to be doing a little better.  cxr being considered.

## 2017-08-15 NOTE — PROGRESS NOTE BEHAVIORAL HEALTH - NSBHADMITCOUNSEL_PSY_A_CORE
prognosis/instructions for management, treatment and follow up/risks and benefits of treatment options

## 2017-08-15 NOTE — PROGRESS NOTE BEHAVIORAL HEALTH - NSBHFUPINTERVALHXFT_PSY_A_CORE
Dr. Little and medical consultation notes functional improvement.  refused PT consultation;  Had low grade fever no no SOB or cough.  However importance of soft diet to avoid aspiration discussed.  Consider CXR in view of low grade fever but no pulmonary sxs.      He was started by weekend covering psychiatrist on Gabapentin 300 mg po TID for anxiety.   Repeat EKG w/ qtc 452 (was 483).

## 2017-08-16 PROCEDURE — 99233 SBSQ HOSP IP/OBS HIGH 50: CPT | Mod: GC

## 2017-08-16 RX ADMIN — Medication 100 MILLIGRAM(S): at 10:31

## 2017-08-16 RX ADMIN — Medication 4: at 11:42

## 2017-08-16 RX ADMIN — Medication 0.5 MILLIGRAM(S): at 10:30

## 2017-08-16 RX ADMIN — QUETIAPINE FUMARATE 100 MILLIGRAM(S): 200 TABLET, FILM COATED ORAL at 21:24

## 2017-08-16 RX ADMIN — Medication 3 CAPSULE(S): at 16:57

## 2017-08-16 RX ADMIN — FAMOTIDINE 20 MILLIGRAM(S): 10 INJECTION INTRAVENOUS at 21:24

## 2017-08-16 RX ADMIN — METFORMIN HYDROCHLORIDE 1000 MILLIGRAM(S): 850 TABLET ORAL at 07:42

## 2017-08-16 RX ADMIN — Medication 3 CAPSULE(S): at 10:31

## 2017-08-16 RX ADMIN — Medication 0.5 MILLIGRAM(S): at 21:24

## 2017-08-16 RX ADMIN — GABAPENTIN 300 MILLIGRAM(S): 400 CAPSULE ORAL at 10:31

## 2017-08-16 RX ADMIN — GABAPENTIN 300 MILLIGRAM(S): 400 CAPSULE ORAL at 14:28

## 2017-08-16 RX ADMIN — Medication 1 MILLIGRAM(S): at 10:30

## 2017-08-16 RX ADMIN — Medication 1 TABLET(S): at 10:31

## 2017-08-16 RX ADMIN — METHADONE HYDROCHLORIDE 50 MILLIGRAM(S): 40 TABLET ORAL at 10:31

## 2017-08-16 RX ADMIN — Medication 50 MILLIGRAM(S): at 21:24

## 2017-08-16 RX ADMIN — Medication 2: at 16:57

## 2017-08-16 RX ADMIN — GABAPENTIN 300 MILLIGRAM(S): 400 CAPSULE ORAL at 21:24

## 2017-08-16 RX ADMIN — Medication 3 CAPSULE(S): at 07:42

## 2017-08-16 RX ADMIN — Medication 4: at 21:29

## 2017-08-16 RX ADMIN — FAMOTIDINE 20 MILLIGRAM(S): 10 INJECTION INTRAVENOUS at 10:31

## 2017-08-16 RX ADMIN — ESCITALOPRAM OXALATE 10 MILLIGRAM(S): 10 TABLET, FILM COATED ORAL at 10:31

## 2017-08-16 RX ADMIN — Medication 0.5 MILLIGRAM(S): at 14:29

## 2017-08-16 RX ADMIN — METFORMIN HYDROCHLORIDE 1000 MILLIGRAM(S): 850 TABLET ORAL at 16:57

## 2017-08-16 NOTE — PROGRESS NOTE BEHAVIORAL HEALTH - NSBHFUPINTERVALHXFT_PSY_A_CORE
Pt interviewed this morning at bedside following breakfast. Pt states that his mood including his depression and anxiety is unchanged. Denying current SI but continues to endorse some hoplessness. He reports ok sleep w/ Seroquel but low energy and continued poor appetite. However, pt is aware of  and agrees with change to soft diet. PT recommend that patient is capable of living at home with support of home health assistant. Per , family meeting tomorrow with patient's son to discuss disposition. Pt states he does not feel ready to go home. Pt interviewed this morning at bedside following breakfast. Pt states that his mood including his depression and anxiety is unchanged. Denying current SI but continues to endorse some hoplessness. He reports ok sleep w/ Seroquel but low energy and continued poor appetite. However, pt is aware of  and agrees with change to soft diet. PT recommend that patient is capable of living at home with support of home health assistant. Per , family meeting tomorrow with patient's son to discuss disposition. Pt states he does not feel ready to go home.    Spoke with Mattie at [facility] ([phone]) who requested information regarding patient's current inpatient status and ongoing Methadone treatment. Pt is aware and consents. Pt interviewed this morning at bedside following breakfast. Pt states that his mood including his depression and anxiety is unchanged. Denying current SI but continues to endorse some hopelessness. He reports ok sleep w/ Seroquel but low energy and continued poor appetite. However, pt is aware of  and agrees with change to soft diet. PT recommend that patient is capable of living at home with support of home health assistant. Per , family meeting tomorrow with patient's son to discuss disposition. Pt states he does not feel ready to go home.    Spoke with Mattie at [facility] ([phone]) who requested information regarding patient's current inpatient status and ongoing Methadone treatment. Pt is aware and consents.

## 2017-08-16 NOTE — PROGRESS NOTE BEHAVIORAL HEALTH - SUMMARY
66M PPH heroin use disorder on methadone, one prior psychiatric admission for suicide attempt via OD following wife's death, on prescribed benzodiazepines by PMD, unclear prior diagnosis of dementia, presenting to [facility] after wandering from home and treated for presumed aspiration PNA on medicine floor, then transferred to psychiatric unit for suicidal ideation in context of increasing anxiety due to tapering of methadone and longstanding benzodiazepine use.  Patient this week appearing alert indicating patient's presentation on admission was largely in setting of overmedication on methadone and benzodiazepines, with potential overlying delirium 2/2 PNA.       Pt endorses persistent severe anxiety since tapering which per patient precipitated his suicidal ideation. The source of patient's cognitive dysfunction (reported dementia diagnosis and increased forgetfulness/disorientation) prior to arrival is unclear and may be attributed to effects of high dose methadone (220mg) and chronic Xanax and Klonopin. Uncertain based on current preliminary assessment (patient is AAOx3, no apparent paranoia, delusions or forgetfulness; some cognitive slowing that may be attributed to depression) if there is any primary dementia process. Must also consider pseudodementia of depression as patient's cognitive decline coincides with onset of his depression following wife's passing and has not been effectively treated up until this time. Agree with continuing low dose Klonopin due to risk of benzo withdrawal. Will continue Lexapro and Seroquel for treatment of depression. Agree with considering home clonidine as treatment for potential opiate withdrawal/hypertension. Nutrition consulted given patient's poor appetite and thinning body habitus. Will consider PT consult due to patient's difficulty walking. Dispo will be assessed accordingly. Expressing severe passive and some active suicidal ideation at this time and requires stabilization and medication management on psychiatric unit to mitigate acute risk of self-harm.    Medicine is following for management of chronic medical conditions. Pt recently completed antibiotic course for aspiration pneumonia.     Differential: major depressive disorder w/ comorbid anxiety vs substance induced mood disorder (?excessive use of benzodiazepenes)    toxic metabolic encephalopathy vs. pseudodementia of depression vs primary dementia process.     08/14/17: pt appears weaker; weekend covering psychiatrist started on 300 mg po TID Neurontin for anxiety; consulting w/ nutrition regarding possible liquid shake supplements -- pt has unintentional weight loss contributing to low energy and mood    8/15: patient a little more verbal and engagable when discussing idea of going home; low grade fever but no pulmonary sxs.  see by Dr. Little of medicine and appears to be doing a little better.  cxr being considered.    08/16: Pt w/ persistent depression/anxiety/hopelessness and appears fatigued and weak. Transitioning to soft diet which should improve his nutrition and energy level; tolerating lexapro, seroquel, neurontin, klonopin; methadone at 50 mg po 66M PPH heroin use disorder on methadone, one prior psychiatric admission for suicide attempt via OD following wife's death, on prescribed benzodiazepines by PMD, unclear prior diagnosis of dementia, presenting to [facility] after wandering from home and treated for presumed aspiration PNA on medicine floor, then transferred to psychiatric unit for suicidal ideation in context of increasing anxiety due to tapering of methadone and longstanding benzodiazepine use.  Patient this week appearing alert indicating patient's presentation on admission was largely in setting of overmedication on methadone and benzodiazepines, with potential overlying delirium 2/2 PNA.       Pt endorses persistent severe anxiety since tapering which per patient precipitated his suicidal ideation. The source of patient's cognitive dysfunction (reported dementia diagnosis and increased forgetfulness/disorientation) prior to arrival is unclear and may be attributed to effects of high dose methadone (220mg) and chronic Xanax and Klonopin. Uncertain based on current preliminary assessment (patient is AAOx3, no apparent paranoia, delusions or forgetfulness; some cognitive slowing that may be attributed to depression) if there is any primary dementia process. Must also consider pseudodementia of depression as patient's cognitive decline coincides with onset of his depression following wife's passing and has not been effectively treated up until this time. Agree with continuing low dose Klonopin due to risk of benzo withdrawal. Will continue Lexapro and Seroquel for treatment of depression. Agree with considering home clonidine as treatment for potential opiate withdrawal/hypertension. Nutrition consulted given patient's poor appetite and thinning body habitus. Will consider PT consult due to patient's difficulty walking. Dispo will be assessed accordingly. Expressing severe passive and some active suicidal ideation at this time and requires stabilization and medication management on psychiatric unit to mitigate acute risk of self-harm.    Medicine is following for management of chronic medical conditions. Pt recently completed antibiotic course for aspiration pneumonia.     Differential: major depressive disorder w/ comorbid anxiety vs substance induced mood disorder (?excessive use of benzodiazepenes)    toxic metabolic encephalopathy vs. pseudodementia of depression vs primary dementia process.     08/14/17: pt appears weaker; weekend covering psychiatrist started on 300 mg po TID Neurontin for anxiety; consulting w/ nutrition regarding possible liquid shake supplements -- pt has unintentional weight loss contributing to low energy and mood    8/15: patient a little more verbal and engagable when discussing idea of going home; low grade fever but no pulmonary sxs.  see by Dr. Little of medicine and appears to be doing a little better.  cxr being considered.    08/16: Pt w/ persistent depression/anxiety/hopelessness and appears fatigued and weak. Transitioning to soft diet which should improve his nutrition and energy level; tolerating lexapro, seroquel, neurontin, klonopin; methadone at 50 mg po. Family meeting with son pending for 08/17 re: disposition. PT recommends transition to home w/ home aide. 66M PPH heroin use disorder on methadone, one prior psychiatric admission for suicide attempt via OD following wife's death, on prescribed benzodiazepines by PMD, unclear prior diagnosis of dementia, presenting to [facility] after wandering from home and treated for presumed aspiration PNA on medicine floor, then transferred to psychiatric unit for suicidal ideation in context of increasing anxiety due to tapering of methadone and longstanding benzodiazepine use.  Patient this week appearing alert indicating patient's presentation on admission was largely in setting of overmedication on methadone and benzodiazepines, with potential overlying delirium 2/2 PNA.       Medicine is following for management of chronic medical conditions. Pt recently completed antibiotic course for aspiration pneumonia.     Differential: major depressive disorder w/ comorbid anxiety vs substance induced mood disorder (?excessive use of benzodiazepenes)    toxic metabolic encephalopathy vs. pseudodementia of depression vs primary dementia process.     08/14/17: pt appears weaker; weekend covering psychiatrist started on 300 mg po TID Neurontin for anxiety; consulting w/ nutrition regarding possible liquid shake supplements -- pt has unintentional weight loss contributing to low energy and mood    8/15: patient a little more verbal and engagable when discussing idea of going home; low grade fever but no pulmonary sxs.  see by Dr. Little of medicine and appears to be doing a little better.  cxr being considered.    08/16: Pt w/ persistent depression/anxiety/hopelessness and appears fatigued and weak. Transitioning to soft diet which should improve his nutrition and energy level; tolerating lexapro, seroquel, neurontin, klonopin; methadone at 50 mg po. Family meeting with son pending for 08/17 re: disposition. PT recommends transition to home w/ home aide.

## 2017-08-16 NOTE — PROGRESS NOTE BEHAVIORAL HEALTH - PROBLEM SELECTOR PLAN 1
continue methadone and klonopin to address dependence - continue methadone and klonopin to address dependence  - methadone at 50 mg po, klonopin 0.5 mg TID

## 2017-08-16 NOTE — PROGRESS NOTE BEHAVIORAL HEALTH - PROBLEM SELECTOR PLAN 3
w/ comorbid anxiety  - continue Lexapro 10 mg qd  - Gabapentin 300 mg po TID for anxiety w/ comorbid anxiety  - continue Lexapro 10 mg qd  - continue Gabapentin 300 mg po TID for anxiety  - continue Seroquel 100 mg po qhs

## 2017-08-17 PROCEDURE — 99233 SBSQ HOSP IP/OBS HIGH 50: CPT | Mod: GC

## 2017-08-17 RX ORDER — QUETIAPINE FUMARATE 200 MG/1
150 TABLET, FILM COATED ORAL AT BEDTIME
Qty: 0 | Refills: 0 | Status: DISCONTINUED | OUTPATIENT
Start: 2017-08-17 | End: 2017-08-21

## 2017-08-17 RX ADMIN — FAMOTIDINE 20 MILLIGRAM(S): 10 INJECTION INTRAVENOUS at 21:19

## 2017-08-17 RX ADMIN — Medication 650 MILLIGRAM(S): at 11:44

## 2017-08-17 RX ADMIN — Medication 0.5 MILLIGRAM(S): at 10:38

## 2017-08-17 RX ADMIN — Medication 3 CAPSULE(S): at 11:49

## 2017-08-17 RX ADMIN — METHADONE HYDROCHLORIDE 50 MILLIGRAM(S): 40 TABLET ORAL at 10:37

## 2017-08-17 RX ADMIN — Medication 50 MILLIGRAM(S): at 10:40

## 2017-08-17 RX ADMIN — QUETIAPINE FUMARATE 150 MILLIGRAM(S): 200 TABLET, FILM COATED ORAL at 21:20

## 2017-08-17 RX ADMIN — Medication 0.5 MILLIGRAM(S): at 18:11

## 2017-08-17 RX ADMIN — GABAPENTIN 300 MILLIGRAM(S): 400 CAPSULE ORAL at 21:19

## 2017-08-17 RX ADMIN — Medication 1 TABLET(S): at 10:37

## 2017-08-17 RX ADMIN — ESCITALOPRAM OXALATE 10 MILLIGRAM(S): 10 TABLET, FILM COATED ORAL at 10:40

## 2017-08-17 RX ADMIN — Medication 0.5 MILLIGRAM(S): at 21:19

## 2017-08-17 RX ADMIN — Medication 100 MILLIGRAM(S): at 10:37

## 2017-08-17 RX ADMIN — Medication 3 CAPSULE(S): at 17:32

## 2017-08-17 RX ADMIN — METFORMIN HYDROCHLORIDE 1000 MILLIGRAM(S): 850 TABLET ORAL at 07:50

## 2017-08-17 RX ADMIN — Medication 50 MILLIGRAM(S): at 21:19

## 2017-08-17 RX ADMIN — Medication 1 MILLIGRAM(S): at 10:37

## 2017-08-17 RX ADMIN — Medication 2: at 21:18

## 2017-08-17 RX ADMIN — Medication 3 CAPSULE(S): at 07:50

## 2017-08-17 RX ADMIN — FAMOTIDINE 20 MILLIGRAM(S): 10 INJECTION INTRAVENOUS at 10:37

## 2017-08-17 RX ADMIN — METFORMIN HYDROCHLORIDE 1000 MILLIGRAM(S): 850 TABLET ORAL at 17:31

## 2017-08-17 RX ADMIN — GABAPENTIN 300 MILLIGRAM(S): 400 CAPSULE ORAL at 18:11

## 2017-08-17 RX ADMIN — Medication: at 11:48

## 2017-08-17 RX ADMIN — GABAPENTIN 300 MILLIGRAM(S): 400 CAPSULE ORAL at 10:40

## 2017-08-17 NOTE — PROGRESS NOTE BEHAVIORAL HEALTH - PROBLEM SELECTOR PLAN 1
- continue methadone and klonopin to address dependence  - methadone at 50 mg po, klonopin 0.5 mg TID

## 2017-08-17 NOTE — PROGRESS NOTE BEHAVIORAL HEALTH - PROBLEM SELECTOR PLAN 3
w/ comorbid anxiety  - continue Lexapro 10 mg qd  - continue Gabapentin 300 mg po TID for anxiety  - Increase Seroquel to 150 mg po qhs

## 2017-08-17 NOTE — PROGRESS NOTE BEHAVIORAL HEALTH - SUMMARY
66M PPH heroin use disorder on methadone, one prior psychiatric admission for suicide attempt via OD following wife's death, on prescribed benzodiazepines by PMD, unclear prior diagnosis of dementia, presenting to [facility] after wandering from home and treated for presumed aspiration PNA on medicine floor, then transferred to psychiatric unit for suicidal ideation in context of increasing anxiety due to tapering of methadone and longstanding benzodiazepine use.  Patient this week appearing alert indicating patient's presentation on admission was largely in setting of overmedication on methadone and benzodiazepines, with potential overlying delirium 2/2 PNA.       Medicine is following for management of chronic medical conditions. Pt recently completed antibiotic course for aspiration pneumonia.     Differential: major depressive disorder w/ comorbid anxiety vs substance induced mood disorder (?excessive use of benzodiazepenes)    toxic metabolic encephalopathy vs. pseudodementia of depression vs primary dementia process.     08/14/17: pt appears weaker; weekend covering psychiatrist started on 300 mg po TID Neurontin for anxiety; consulting w/ nutrition regarding possible liquid shake supplements -- pt has unintentional weight loss contributing to low energy and mood    8/15: patient a little more verbal and engagable when discussing idea of going home; low grade fever but no pulmonary sxs.  see by Dr. Little of medicine and appears to be doing a little better.  cxr being considered.    08/16: Pt w/ persistent depression/anxiety/hopelessness and appears fatigued and weak. Transitioning to soft diet which should improve his nutrition and energy level; tolerating lexapro, seroquel, neurontin, klonopin; methadone at 50 mg po. Family meeting with son pending for 08/17 re: disposition. PT recommends transition to home w/ home aide.    08/17: Pt w/ unremitting symptoms of depression and anxiety; will increase qhs Seroquel po to 150 mg to address mood symptoms. Family meeting with son postponed until 08/17.

## 2017-08-17 NOTE — PROGRESS NOTE BEHAVIORAL HEALTH - NSBHFUPINTERVALHXFT_PSY_A_CORE
Pt interviewed this morning following breakfast, observed lying in bed staring blankly at window. Pt reports continuing anxiety and depression; is unable to express thoughts except slowly. States "I'm caught up in how I feel." Complains of vague stomach pains and difficulty eating despite receiving three times daily Glucerna shakes. Continues to endorse sense of hopelessness. Denies SI/HI/AH/VH. Not attending groups due to difficulty walking and low energy. Per , family meeting rescheduled for afternoon tomorrow.

## 2017-08-18 PROCEDURE — 99233 SBSQ HOSP IP/OBS HIGH 50: CPT | Mod: GC

## 2017-08-18 RX ADMIN — Medication 50 MILLIGRAM(S): at 21:13

## 2017-08-18 RX ADMIN — Medication 1 MILLIGRAM(S): at 10:50

## 2017-08-18 RX ADMIN — GABAPENTIN 300 MILLIGRAM(S): 400 CAPSULE ORAL at 15:45

## 2017-08-18 RX ADMIN — FAMOTIDINE 20 MILLIGRAM(S): 10 INJECTION INTRAVENOUS at 21:13

## 2017-08-18 RX ADMIN — METHADONE HYDROCHLORIDE 50 MILLIGRAM(S): 40 TABLET ORAL at 10:50

## 2017-08-18 RX ADMIN — Medication 2: at 21:12

## 2017-08-18 RX ADMIN — METFORMIN HYDROCHLORIDE 1000 MILLIGRAM(S): 850 TABLET ORAL at 07:56

## 2017-08-18 RX ADMIN — Medication 0.5 MILLIGRAM(S): at 15:45

## 2017-08-18 RX ADMIN — Medication 0.5 MILLIGRAM(S): at 21:14

## 2017-08-18 RX ADMIN — Medication 50 MILLIGRAM(S): at 10:52

## 2017-08-18 RX ADMIN — GABAPENTIN 300 MILLIGRAM(S): 400 CAPSULE ORAL at 10:50

## 2017-08-18 RX ADMIN — Medication 3 CAPSULE(S): at 07:56

## 2017-08-18 RX ADMIN — Medication 3 CAPSULE(S): at 15:45

## 2017-08-18 RX ADMIN — METFORMIN HYDROCHLORIDE 1000 MILLIGRAM(S): 850 TABLET ORAL at 15:45

## 2017-08-18 RX ADMIN — Medication 0.5 MILLIGRAM(S): at 10:50

## 2017-08-18 RX ADMIN — Medication 100 MILLIGRAM(S): at 10:50

## 2017-08-18 RX ADMIN — Medication: at 12:04

## 2017-08-18 RX ADMIN — ESCITALOPRAM OXALATE 10 MILLIGRAM(S): 10 TABLET, FILM COATED ORAL at 10:49

## 2017-08-18 RX ADMIN — Medication 1 TABLET(S): at 10:50

## 2017-08-18 RX ADMIN — FAMOTIDINE 20 MILLIGRAM(S): 10 INJECTION INTRAVENOUS at 10:50

## 2017-08-18 RX ADMIN — QUETIAPINE FUMARATE 150 MILLIGRAM(S): 200 TABLET, FILM COATED ORAL at 21:13

## 2017-08-18 RX ADMIN — GABAPENTIN 300 MILLIGRAM(S): 400 CAPSULE ORAL at 21:13

## 2017-08-18 RX ADMIN — Medication 3 CAPSULE(S): at 10:51

## 2017-08-18 NOTE — PROGRESS NOTE BEHAVIORAL HEALTH - PROBLEM SELECTOR PLAN 3
w/ comorbid anxiety  - continue Lexapro 10 mg qd  - continue Gabapentin 300 mg po TID for anxiety  - continue Seroquel 150 mg po qhs

## 2017-08-18 NOTE — PROGRESS NOTE BEHAVIORAL HEALTH - NSBHFUPINTERVALHXFT_PSY_A_CORE
Pt interviewed this morning in dining area following breakfast. Appears depressed, fatigued; however he is slightly more visible in the day per nursing.  Pt reports continuing anxiety and depression. Denies current SI/HI/AH/VH. Pt reports some difficulty swallowing but reports that he is trying to eat more and receiving his glucrena shakes. He continues to express concern regarding his disposition; he is not sure he will do well if discharged to home and is concerned about daily transportation to his methadone clinic. Family meeting with son planned for this afternoon to discuss disposition. Per social work, patient has been approved for an Adult Day Care in [place] near his home and is also eligible to receive increased home health aide assistance.    Spoke with patient's outpatient Methadone clinic at [facility] ([phone]). Spoke with physician on staff Dr. Hall regarding patient's discharge plan for Monday and current 50 mg po Methadone dose (was 220 mg po on admission) and our suggestion that the 50 mg dose be continued. Dr. Hall reported that pt receives his outpatient benzodiazepine (Xanax, Klonopin and previoiusly Valium) prescriptions from PCP Dr. Veloz ([phone]), who was unable to be reached. Will attempt to contact PCP regarding pt's outpatient recommendations. Pt interviewed this morning in dining area following breakfast. Appears depressed, fatigued; however he is slightly more visible in the day per nursing.  Pt reports continuing anxiety and depression. Denies current SI/HI/AH/VH. Pt reports some difficulty swallowing but reports that he is trying to eat more and receiving his glucrena shakes. He continues to express concern regarding his disposition; he is not sure he will do well if discharged to home and is concerned about daily transportation to his methadone clinic. Family meeting with son planned for this afternoon to discuss disposition. Per social work, patient has been approved for an Adult Day Care in [place] near his home and is also eligible to receive increased home health aide assistance.    Spoke with patient's outpatient Methadone clinic at [facility] ([phone]). Spoke with physician on staff Dr. Hall regarding patient's discharge plan for Monday and current 50 mg po Methadone dose (was 220 mg po on admission) and our suggestion that the 50 mg dose be continued. Dr. Hall reported that pt receives his outpatient benzodiazepine (Xanax, Klonopin and previoiusly Valium) prescriptions from PCP Dr. Veloz ([phone]), who was unable to be reached. Will attempt to contact PCP regarding pt's outpatient recommendations.    On afternoon of 08/18, a family meeting was conducted with patient’s son Roman regarding patient’s disposition plan, and his son agreed to continue playing an active role in the patient’s care, including monitoring patient’s compliance with appointments and current medication regime. Patient's son is in agreement with current plan for discharge on Monday with increased home health aide supervision and adult day care participation with arranged transport.

## 2017-08-18 NOTE — PROGRESS NOTE BEHAVIORAL HEALTH - SUMMARY
66M PPH heroin use disorder on methadone, one prior psychiatric admission for suicide attempt via OD following wife's death, on prescribed benzodiazepines by PMD, unclear prior diagnosis of dementia, presenting to [facility] after wandering from home and treated for presumed aspiration PNA on medicine floor, then transferred to psychiatric unit for suicidal ideation in context of increasing anxiety due to tapering of methadone and longstanding benzodiazepine use.  Patient this week appearing alert indicating patient's presentation on admission was largely in setting of overmedication on methadone and benzodiazepines, with potential overlying delirium 2/2 PNA.       Medicine is following for management of chronic medical conditions. Pt recently completed antibiotic course for aspiration pneumonia.     Differential: major depressive disorder w/ comorbid anxiety vs substance induced mood disorder (?excessive use of benzodiazepenes)    toxic metabolic encephalopathy vs. pseudodementia of depression vs primary dementia process.     08/14/17: pt appears weaker; weekend covering psychiatrist started on 300 mg po TID Neurontin for anxiety; consulting w/ nutrition regarding possible liquid shake supplements -- pt has unintentional weight loss contributing to low energy and mood    8/15: patient a little more verbal and engagable when discussing idea of going home; low grade fever but no pulmonary sxs.  see by Dr. Little of medicine and appears to be doing a little better.  cxr being considered.    08/16: Pt w/ persistent depression/anxiety/hopelessness and appears fatigued and weak. Transitioning to soft diet which should improve his nutrition and energy level; tolerating lexapro, seroquel, neurontin, klonopin; methadone at 50 mg po. Family meeting with son pending for 08/17 re: disposition. PT recommends transition to home w/ home aide.    08/17: Pt w/ unremitting symptoms of depression and anxiety; will increase qhs Seroquel po to 150 mg to address mood symptoms. Family meeting with son postponed until 08/17.

## 2017-08-19 RX ADMIN — Medication 3 CAPSULE(S): at 07:24

## 2017-08-19 RX ADMIN — Medication 6: at 11:21

## 2017-08-19 RX ADMIN — Medication 1 MILLIGRAM(S): at 11:11

## 2017-08-19 RX ADMIN — GABAPENTIN 300 MILLIGRAM(S): 400 CAPSULE ORAL at 14:50

## 2017-08-19 RX ADMIN — Medication 50 MILLIGRAM(S): at 11:25

## 2017-08-19 RX ADMIN — QUETIAPINE FUMARATE 150 MILLIGRAM(S): 200 TABLET, FILM COATED ORAL at 21:45

## 2017-08-19 RX ADMIN — Medication 100 MILLIGRAM(S): at 11:11

## 2017-08-19 RX ADMIN — GABAPENTIN 300 MILLIGRAM(S): 400 CAPSULE ORAL at 21:45

## 2017-08-19 RX ADMIN — METHADONE HYDROCHLORIDE 50 MILLIGRAM(S): 40 TABLET ORAL at 11:11

## 2017-08-19 RX ADMIN — Medication 3 CAPSULE(S): at 17:18

## 2017-08-19 RX ADMIN — METFORMIN HYDROCHLORIDE 1000 MILLIGRAM(S): 850 TABLET ORAL at 17:18

## 2017-08-19 RX ADMIN — GABAPENTIN 300 MILLIGRAM(S): 400 CAPSULE ORAL at 11:11

## 2017-08-19 RX ADMIN — FAMOTIDINE 20 MILLIGRAM(S): 10 INJECTION INTRAVENOUS at 21:45

## 2017-08-19 RX ADMIN — Medication 0.5 MILLIGRAM(S): at 14:50

## 2017-08-19 RX ADMIN — Medication 2: at 17:15

## 2017-08-19 RX ADMIN — Medication 0.5 MILLIGRAM(S): at 11:10

## 2017-08-19 RX ADMIN — ESCITALOPRAM OXALATE 10 MILLIGRAM(S): 10 TABLET, FILM COATED ORAL at 11:11

## 2017-08-19 RX ADMIN — Medication 0.5 MILLIGRAM(S): at 21:45

## 2017-08-19 RX ADMIN — METFORMIN HYDROCHLORIDE 1000 MILLIGRAM(S): 850 TABLET ORAL at 07:24

## 2017-08-19 RX ADMIN — Medication 50 MILLIGRAM(S): at 21:45

## 2017-08-19 RX ADMIN — FAMOTIDINE 20 MILLIGRAM(S): 10 INJECTION INTRAVENOUS at 11:11

## 2017-08-19 RX ADMIN — Medication 3 CAPSULE(S): at 11:11

## 2017-08-19 RX ADMIN — Medication 1 TABLET(S): at 11:11

## 2017-08-20 RX ADMIN — GABAPENTIN 300 MILLIGRAM(S): 400 CAPSULE ORAL at 22:13

## 2017-08-20 RX ADMIN — Medication 3 CAPSULE(S): at 07:29

## 2017-08-20 RX ADMIN — METFORMIN HYDROCHLORIDE 1000 MILLIGRAM(S): 850 TABLET ORAL at 16:48

## 2017-08-20 RX ADMIN — ESCITALOPRAM OXALATE 10 MILLIGRAM(S): 10 TABLET, FILM COATED ORAL at 09:55

## 2017-08-20 RX ADMIN — Medication 2: at 16:48

## 2017-08-20 RX ADMIN — Medication 100 MILLIGRAM(S): at 09:55

## 2017-08-20 RX ADMIN — Medication 50 MILLIGRAM(S): at 22:13

## 2017-08-20 RX ADMIN — GABAPENTIN 300 MILLIGRAM(S): 400 CAPSULE ORAL at 12:43

## 2017-08-20 RX ADMIN — QUETIAPINE FUMARATE 150 MILLIGRAM(S): 200 TABLET, FILM COATED ORAL at 22:13

## 2017-08-20 RX ADMIN — Medication 0.5 MILLIGRAM(S): at 12:43

## 2017-08-20 RX ADMIN — GABAPENTIN 300 MILLIGRAM(S): 400 CAPSULE ORAL at 09:55

## 2017-08-20 RX ADMIN — Medication 4: at 22:13

## 2017-08-20 RX ADMIN — METFORMIN HYDROCHLORIDE 1000 MILLIGRAM(S): 850 TABLET ORAL at 07:29

## 2017-08-20 RX ADMIN — FAMOTIDINE 20 MILLIGRAM(S): 10 INJECTION INTRAVENOUS at 09:55

## 2017-08-20 RX ADMIN — METHADONE HYDROCHLORIDE 50 MILLIGRAM(S): 40 TABLET ORAL at 09:56

## 2017-08-20 RX ADMIN — Medication 3 CAPSULE(S): at 11:47

## 2017-08-20 RX ADMIN — Medication 0.5 MILLIGRAM(S): at 09:55

## 2017-08-20 RX ADMIN — Medication 3 CAPSULE(S): at 16:48

## 2017-08-20 RX ADMIN — Medication 1 TABLET(S): at 09:55

## 2017-08-20 RX ADMIN — Medication 50 MILLIGRAM(S): at 09:55

## 2017-08-20 RX ADMIN — Medication 0.5 MILLIGRAM(S): at 22:13

## 2017-08-20 RX ADMIN — FAMOTIDINE 20 MILLIGRAM(S): 10 INJECTION INTRAVENOUS at 22:13

## 2017-08-20 RX ADMIN — Medication 1 MILLIGRAM(S): at 09:55

## 2017-08-21 VITALS
RESPIRATION RATE: 16 BRPM | TEMPERATURE: 98 F | DIASTOLIC BLOOD PRESSURE: 71 MMHG | HEART RATE: 75 BPM | SYSTOLIC BLOOD PRESSURE: 130 MMHG

## 2017-08-21 PROCEDURE — 97110 THERAPEUTIC EXERCISES: CPT

## 2017-08-21 PROCEDURE — 97116 GAIT TRAINING THERAPY: CPT

## 2017-08-21 PROCEDURE — 97161 PT EVAL LOW COMPLEX 20 MIN: CPT

## 2017-08-21 PROCEDURE — 80061 LIPID PANEL: CPT

## 2017-08-21 PROCEDURE — 36415 COLL VENOUS BLD VENIPUNCTURE: CPT

## 2017-08-21 PROCEDURE — 99232 SBSQ HOSP IP/OBS MODERATE 35: CPT | Mod: GC

## 2017-08-21 PROCEDURE — 80048 BASIC METABOLIC PNL TOTAL CA: CPT

## 2017-08-21 PROCEDURE — 93005 ELECTROCARDIOGRAM TRACING: CPT

## 2017-08-21 RX ORDER — THIAMINE MONONITRATE (VIT B1) 100 MG
1 TABLET ORAL
Qty: 14 | Refills: 0 | OUTPATIENT
Start: 2017-08-21 | End: 2017-09-04

## 2017-08-21 RX ORDER — FOLIC ACID 0.8 MG
1 TABLET ORAL
Qty: 14 | Refills: 0 | OUTPATIENT
Start: 2017-08-21 | End: 2017-09-04

## 2017-08-21 RX ORDER — INSULIN LISPRO 100/ML
4 VIAL (ML) SUBCUTANEOUS
Qty: 0 | Refills: 0 | COMMUNITY

## 2017-08-21 RX ORDER — FAMOTIDINE 10 MG/ML
1 INJECTION INTRAVENOUS
Qty: 28 | Refills: 0 | OUTPATIENT
Start: 2017-08-21 | End: 2017-09-04

## 2017-08-21 RX ORDER — THIAMINE MONONITRATE (VIT B1) 100 MG
1 TABLET ORAL
Qty: 0 | Refills: 0 | COMMUNITY
Start: 2017-08-21

## 2017-08-21 RX ORDER — METOPROLOL TARTRATE 50 MG
1 TABLET ORAL
Qty: 0 | Refills: 0 | COMMUNITY

## 2017-08-21 RX ORDER — CLONAZEPAM 1 MG
1 TABLET ORAL
Qty: 21 | Refills: 0 | OUTPATIENT
Start: 2017-08-21 | End: 2017-08-28

## 2017-08-21 RX ORDER — ESCITALOPRAM OXALATE 10 MG/1
1 TABLET, FILM COATED ORAL
Qty: 14 | Refills: 0 | OUTPATIENT
Start: 2017-08-21 | End: 2017-09-04

## 2017-08-21 RX ORDER — METFORMIN HYDROCHLORIDE 850 MG/1
1 TABLET ORAL
Qty: 28 | Refills: 0 | OUTPATIENT
Start: 2017-08-21 | End: 2017-09-04

## 2017-08-21 RX ORDER — GABAPENTIN 400 MG/1
1 CAPSULE ORAL
Qty: 42 | Refills: 0 | OUTPATIENT
Start: 2017-08-21 | End: 2017-09-04

## 2017-08-21 RX ORDER — QUETIAPINE FUMARATE 200 MG/1
3 TABLET, FILM COATED ORAL
Qty: 42 | Refills: 0 | OUTPATIENT
Start: 2017-08-21 | End: 2017-09-04

## 2017-08-21 RX ORDER — LOSARTAN POTASSIUM 100 MG/1
1 TABLET, FILM COATED ORAL
Qty: 0 | Refills: 0 | COMMUNITY

## 2017-08-21 RX ORDER — LIPASE/PROTEASE/AMYLASE 16-48-48K
3 CAPSULE,DELAYED RELEASE (ENTERIC COATED) ORAL
Qty: 126 | Refills: 0 | OUTPATIENT
Start: 2017-08-21 | End: 2017-09-04

## 2017-08-21 RX ORDER — INSULIN GLARGINE 100 [IU]/ML
13 INJECTION, SOLUTION SUBCUTANEOUS
Qty: 0 | Refills: 0 | COMMUNITY

## 2017-08-21 RX ORDER — CLONAZEPAM 1 MG
1 TABLET ORAL
Qty: 42 | Refills: 0 | OUTPATIENT
Start: 2017-08-21 | End: 2017-09-04

## 2017-08-21 RX ORDER — ESCITALOPRAM OXALATE 10 MG/1
1 TABLET, FILM COATED ORAL
Qty: 0 | Refills: 0 | COMMUNITY

## 2017-08-21 RX ORDER — METOPROLOL TARTRATE 50 MG
1 TABLET ORAL
Qty: 28 | Refills: 0 | OUTPATIENT
Start: 2017-08-21 | End: 2017-09-04

## 2017-08-21 RX ADMIN — Medication 50 MILLIGRAM(S): at 10:58

## 2017-08-21 RX ADMIN — FAMOTIDINE 20 MILLIGRAM(S): 10 INJECTION INTRAVENOUS at 10:49

## 2017-08-21 RX ADMIN — Medication 1 MILLIGRAM(S): at 10:49

## 2017-08-21 RX ADMIN — ESCITALOPRAM OXALATE 10 MILLIGRAM(S): 10 TABLET, FILM COATED ORAL at 10:49

## 2017-08-21 RX ADMIN — METFORMIN HYDROCHLORIDE 1000 MILLIGRAM(S): 850 TABLET ORAL at 07:42

## 2017-08-21 RX ADMIN — Medication 3 CAPSULE(S): at 12:34

## 2017-08-21 RX ADMIN — Medication 0.5 MILLIGRAM(S): at 10:49

## 2017-08-21 RX ADMIN — GABAPENTIN 300 MILLIGRAM(S): 400 CAPSULE ORAL at 13:43

## 2017-08-21 RX ADMIN — GABAPENTIN 300 MILLIGRAM(S): 400 CAPSULE ORAL at 10:49

## 2017-08-21 RX ADMIN — METHADONE HYDROCHLORIDE 50 MILLIGRAM(S): 40 TABLET ORAL at 10:49

## 2017-08-21 RX ADMIN — Medication 3 CAPSULE(S): at 07:43

## 2017-08-21 RX ADMIN — Medication 0.5 MILLIGRAM(S): at 13:43

## 2017-08-21 RX ADMIN — Medication 6: at 13:44

## 2017-08-21 RX ADMIN — Medication 1 TABLET(S): at 10:49

## 2017-08-21 RX ADMIN — Medication 100 MILLIGRAM(S): at 10:49

## 2017-08-21 NOTE — PROGRESS NOTE BEHAVIORAL HEALTH - RISK ASSESSMENT
Fixed: Male, age, history of substance abuse, history of suicide attempt, chronic medical conditions  Modifiable: Depression amenable to medication and therapy;   Protective: Engaged with treatment; supportive family; residential stability;     Pt is at chronic elevated risk of self-harm given past history of substance abuse and suicide attempt as well as demographic factors; currently displaying severe anxiety in context of benzo and methadone tapering as well as cognitive dysfunction in setting of possible dementia process?/pseudodementia of depression? and toxic metabolic encephalopathy. Expressing severe passive suicidal ideation and requires stabilization and medication management on psychiatric unit to mitigate acute risk of self-harm.
good risk assessment
Fixed: Male, age, history of substance abuse, history of suicide attempt, chronic medical conditions  Modifiable: Depression amenable to medication and therapy;   Protective: Engaged with treatment; supportive family; residential stability;     Pt is at chronic elevated risk of self-harm given past history of substance abuse and suicide attempt as well as demographic factors; currently displaying severe anxiety in context of benzo and methadone tapering as well as cognitive dysfunction in setting of possible dementia process?/pseudodementia of depression? and toxic metabolic encephalopathy. Expressing severe passive suicidal ideation and requires stabilization and medication management on psychiatric unit to mitigate acute risk of self-harm.
Fixed: Male, age, history of substance abuse, history of suicide attempt, chronic medical conditions  Modifiable: Depression amenable to medication and therapy;   Protective: Engaged with treatment; supportive family; residential stability;     Pt is at chronic elevated risk of self-harm given past history of substance abuse and suicide attempt as well as demographic factors; currently displaying severe anxiety in context of benzo and methadone tapering as well as cognitive dysfunction in setting of possible dementia process?/pseudodementia of depression? and toxic metabolic encephalopathy. Pt's methadone and benzodiazepine use have been tapered down since admissioin. Currently denying suicidal ideation; does have persistent depression/anxiety but improved since tapering was started. Pt's acute risk of self-harm is mitigated and he may be considered for outpatient planning at this time.
Fixed: Male, age, history of substance abuse, history of suicide attempt, chronic medical conditions  Modifiable: Depression amenable to medication and therapy;   Protective: Engaged with treatment; supportive family; residential stability;     Pt is at chronic elevated risk of self-harm given past history of substance abuse and suicide attempt as well as demographic factors; currently displaying severe anxiety in context of benzo and methadone tapering as well as cognitive dysfunction in setting of possible dementia process?/pseudodementia of depression? and toxic metabolic encephalopathy. Expressing severe passive suicidal ideation and requires stabilization and medication management on psychiatric unit to mitigate acute risk of self-harm.
Fixed: Male, age, history of substance abuse, history of suicide attempt, chronic medical conditions  Modifiable: Depression amenable to medication and therapy;   Protective: Engaged with treatment; supportive family; residential stability;     Pt is at chronic elevated risk of self-harm given past history of substance abuse and suicide attempt as well as demographic factors; Pt's methadone and benzodiazepine use have been tapered down since admission. Currently denying suicidal ideation; does have persistent depression/anxiety but improved since tapering was started. Pt's acute risk of self-harm is mitigated and he may be considered for outpatient planning at this time.
Fixed: Male, age, history of substance abuse, history of suicide attempt, chronic medical conditions  Modifiable: Depression amenable to medication and therapy;   Protective: Engaged with treatment; supportive family; residential stability;     Pt is at chronic elevated risk of self-harm given past history of substance abuse and suicide attempt as well as demographic factors; currently displaying severe anxiety in context of benzo and methadone tapering as well as cognitive dysfunction in setting of possible dementia process?/pseudodementia of depression? and toxic metabolic encephalopathy. Expressing severe passive suicidal ideation and requires stabilization and medication management on psychiatric unit to mitigate acute risk of self-harm.

## 2017-08-21 NOTE — PROGRESS NOTE BEHAVIORAL HEALTH - CASE SUMMARY
see above  ;; 8/17: slightly better eye contact; continues to state he feels "not good" but then states sleep appetite ok or better and no pain complaints.  Disheveled and anxious.
see above  ;; 8/21: makes better eye contact; slightly more interactive; alert; oriented; expects to go home today ; lying in bed; speech wnl; a little less anxious and a little less perssimistic; no s/h i/i/p or AVH
see above  ;; 8/18: "Hope for the best"  lying in bed but makes a bit of eye contact; faily meeting today with son was sucessful.  Planning for support for aftercare.  Patient a bit more visible but still sad and glum. no SI.
see above  ;; 8/16: patient makes some eye contact and minimal conversation; seen lying in bed and later standing by the nursing station.  A little more active and animated than before but still talks about feeling "terrible".  Significant deficits in thinking; ADLs; motivation.  consistent with neurological findings.
66 year old male   transferred from medicine tx for pneumonia; Methadone lowered to 50mg from >200mg and on low dose Klonopin for Xanax use/abuse; tapering; depressed; poor appetite; needs stabilization; on Celexa; continues avoidant; irritable; poor functioning; raising Lexapro to 20mg for depression augmented with Seroquel to 100mg at night  ;; 8/14: seemed worse today;avoidant; says he felt bad but vague; very disheveled; may be due to severe depression but monitor for comorbidities; u/a u/c wnl.

## 2017-08-21 NOTE — PROGRESS NOTE BEHAVIORAL HEALTH - NSBHCHARTREVIEWIMAGING_PSY_A_CORE FT
08/03  head CT no contrast:   FINDINGS:    VENTRICLES AND SULCI: There is enlargement of the sulci, ventricles and   cisterns compatible with mild diffuse parenchymal volume loss.  INTRA-AXIAL: No transcortical infarct or hemorrhage. The gray-white   differentiation is preserved. There is no mass effect or midline shift.   There is minimal periventricular white matter lucency suggesting small   vessel ischemic disease.  EXTRA-AXIAL: No extra-axial fluid collection is present.   VISUALIZED SINUSES: No air-fluid levels are identified.   VISUALIZED MASTOIDS: Well developed and aerated bilaterally.  CALVARIUM: No calvarial fracture.    IMPRESSION: No acute intracranial hemorrhage or calvarial fracture. The   previously perceived hyperdensity in the left frontal lobe is felt to be   artifactual.      08/01   head CT: minor L frontal lobe contusion

## 2017-08-21 NOTE — PROGRESS NOTE BEHAVIORAL HEALTH - PRIMARY DX
Opioid dependence on agonist therapy

## 2017-08-21 NOTE — PROGRESS NOTE BEHAVIORAL HEALTH - NSBHLEGALSTATUS_PSY_A_CORE
9.13 (Voluntary)

## 2017-08-21 NOTE — PROGRESS NOTE BEHAVIORAL HEALTH - PROBLEM SELECTOR PROBLEM 3
Major depression

## 2017-08-21 NOTE — PROGRESS NOTE BEHAVIORAL HEALTH - NSBHADMITIPOBS_PSY_A_CORE
Routine observation

## 2017-08-21 NOTE — PROGRESS NOTE BEHAVIORAL HEALTH - NSBHATTESTSEENBY_PSY_A_CORE
Attending Psychiatrist supervising NP/Trainee, meeting pt...
attending Psychiatrist without NP/Trainee
attending Psychiatrist without NP/Trainee
Attending Psychiatrist supervising NP/Trainee, meeting pt...

## 2017-08-21 NOTE — PROGRESS NOTE BEHAVIORAL HEALTH - SUMMARY
66M PPH heroin use disorder on methadone, one prior psychiatric admission for suicide attempt via OD following wife's death, on prescribed benzodiazepines by PMD, unclear prior diagnosis of dementia, presenting to [facility] after wandering from home and treated for presumed aspiration PNA on medicine floor, then transferred to psychiatric unit for suicidal ideation in context of increasing anxiety due to tapering of methadone and longstanding benzodiazepine use.  Patient this week appearing alert indicating patient's presentation on admission was largely in setting of overmedication on methadone and benzodiazepines, with potential overlying delirium 2/2 PNA.       Medicine is following for management of chronic medical conditions. Pt recently completed antibiotic course for aspiration pneumonia.     Differential: major depressive disorder w/ comorbid anxiety vs substance induced mood disorder (?excessive use of benzodiazepenes)    toxic metabolic encephalopathy vs. pseudodementia of depression vs primary dementia process.     08/14/17: pt appears weaker; weekend covering psychiatrist started on 300 mg po TID Neurontin for anxiety; consulting w/ nutrition regarding possible liquid shake supplements -- pt has unintentional weight loss contributing to low energy and mood    8/15: patient a little more verbal and engagable when discussing idea of going home; low grade fever but no pulmonary sxs.  see by Dr. Little of medicine and appears to be doing a little better.  cxr being considered.    08/16: Pt w/ persistent depression/anxiety/hopelessness and appears fatigued and weak. Transitioning to soft diet which should improve his nutrition and energy level; tolerating lexapro, seroquel, neurontin, klonopin; methadone at 50 mg po. Family meeting with son pending for 08/17 re: disposition. PT recommends transition to home w/ home aide.    08/17: Pt w/ unremitting symptoms of depression and anxiety; will increase qhs Seroquel po to 150 mg to address mood symptoms. Family meeting with son postponed until 08/17.    08/21: Pt has some baseline anxiety and depression but appears less anxious and depressed today; is able to contract for safety and denying current passive or active SI; discharge planning this afternoon to home with plan for increased home health aide supervision and adult day care

## 2017-08-21 NOTE — PROGRESS NOTE BEHAVIORAL HEALTH - NSBHFUPINTERVALHXFT_PSY_A_CORE
Patient evaluated this morning at bedside, observed in no acute distress. Patient states "I don't know if I'm ready to leave" but denies current passive or active SI. Reports that his main concern is transportation to the Methadone clinic--however, patient was  reassured that transportation has been arranged, and this was confirmed with the clinic as well as the resident . Patient reporting persistent anxiety and depression but staff and interviewer perceive improvement since his admission to the unit. Patient's son has also confirmed that his father appears much improved and stronger than he has observed him over the past several months.     Discharge planning for this afternoon. Patient was advised to follow up with his primary care physician for management of chronic medical conditions. Patient was also instructed regarding the importance of continuing follow up with his outpatient psychiatrist for management of his psychiatric medication regime.

## 2017-08-21 NOTE — PROGRESS NOTE BEHAVIORAL HEALTH - ADDITIONAL DETAILS / COMMENTS
Pt is slow to respond to questioning, understands what is said but unable to gather energy to respond. Appears fatigued and weak.
avoids discussion but makes brief eye contact when mentioning going home soon.
Patient appears less anxious and depressed.
Appears weaker today. Pt grows tired easily and does not wish to talk further.

## 2017-08-21 NOTE — PROGRESS NOTE BEHAVIORAL HEALTH - PROBLEM SELECTOR PROBLEM 5
Cirrhosis of liver

## 2017-08-21 NOTE — PROGRESS NOTE BEHAVIORAL HEALTH - NSBHCHARTREVIEWVS_PSY_A_CORE FT
VSS
Vital Signs Last 24 Hrs  T(C): 36.9 (14 Aug 2017 09:15), Max: 36.9 (14 Aug 2017 09:15)  T(F): 98.4 (14 Aug 2017 09:15), Max: 98.4 (14 Aug 2017 09:15)  HR: 88 (14 Aug 2017 09:15) (67 - 88)  BP: 102/68 (14 Aug 2017 09:15) (102/68 - 111/63)  BP(mean): --  RR: 18 (14 Aug 2017 09:15) (17 - 18)  SpO2: --
Vital Signs Last 24 Hrs  T(C): 37.1 (18 Aug 2017 09:35), Max: 37.2 (17 Aug 2017 17:00)  T(F): 98.7 (18 Aug 2017 09:35), Max: 99 (17 Aug 2017 17:00)  HR: 79 (18 Aug 2017 09:35) (69 - 85)  BP: 116/67 (18 Aug 2017 09:35) (111/70 - 121/72)  BP(mean): --  RR: 18 (18 Aug 2017 09:35) (16 - 18)  SpO2: --
Vital Signs Last 24 Hrs  T(C): 37 (16 Aug 2017 10:00), Max: 37.2 (15 Aug 2017 17:00)  T(F): 98.6 (16 Aug 2017 10:00), Max: 98.9 (15 Aug 2017 17:00)  HR: 77 (16 Aug 2017 10:00) (69 - 82)  BP: 105/67 (16 Aug 2017 10:00) (105/62 - 126/76)  BP(mean): --  RR: 18 (16 Aug 2017 10:00) (16 - 18)  SpO2: --
Vital Signs Last 24 Hrs  T(C): 36.4 (17 Aug 2017 10:00), Max: 37.3 (16 Aug 2017 16:04)  T(F): 97.6 (17 Aug 2017 10:00), Max: 99.2 (16 Aug 2017 16:04)  HR: 80 (17 Aug 2017 10:00) (73 - 80)  BP: 115/67 (17 Aug 2017 10:00) (90/57 - 118/60)  BP(mean): --  RR: 18 (17 Aug 2017 10:00) (16 - 18)  SpO2: --
Vital Signs Last 24 Hrs  T(C): 36.9 (14 Aug 2017 09:15), Max: 36.9 (14 Aug 2017 09:15)  T(F): 98.4 (14 Aug 2017 09:15), Max: 98.4 (14 Aug 2017 09:15)  HR: 88 (14 Aug 2017 09:15) (67 - 88)  BP: 102/68 (14 Aug 2017 09:15) (102/68 - 111/63)  BP(mean): --  RR: 18 (14 Aug 2017 09:15) (17 - 18)  SpO2: --
Vital Signs Last 24 Hrs  T(C): 37.1 (18 Aug 2017 09:35), Max: 37.2 (17 Aug 2017 17:00)  T(F): 98.7 (18 Aug 2017 09:35), Max: 99 (17 Aug 2017 17:00)  HR: 79 (18 Aug 2017 09:35) (69 - 85)  BP: 116/67 (18 Aug 2017 09:35) (111/70 - 121/72)  BP(mean): --  RR: 18 (18 Aug 2017 09:35) (16 - 18)  SpO2: --

## 2017-08-21 NOTE — PROGRESS NOTE BEHAVIORAL HEALTH - NSBHADMITMEDEDUDETAILS_A_CORE FT
risk benefit assessment

## 2017-08-21 NOTE — PROGRESS NOTE BEHAVIORAL HEALTH - NSBHCHARTREVIEWINVESTIGATE_PSY_A_CORE FT
08/08:  EKG sinus tachy (107) w/ qtc 483

## 2017-08-21 NOTE — PROGRESS NOTE BEHAVIORAL HEALTH - MODIFICATIONS
continue with current medications; encourage socialization; arrange supports for aftercare
patient may be d/c'd for outpatient care with assistance at home;
conitnue current medications; arrange home supports; d/c on 8/21
minimal but some improvement on current regime; would continue; begin aftercare planning
avoidant and impoverished; continue Celexa but may need to consider

## 2017-08-21 NOTE — PROGRESS NOTE BEHAVIORAL HEALTH - PROBLEM SELECTOR PROBLEM 2
Toxic metabolic encephalopathy

## 2017-08-21 NOTE — PROGRESS NOTE BEHAVIORAL HEALTH - NS ED BHA MSE SPEECH SPONTANEITY
Increased latency

## 2017-08-21 NOTE — PROGRESS NOTE BEHAVIORAL HEALTH - NSBHPTASSESSDT_PSY_A_CORE
12-Aug-2017 11:30
14-Aug-2017 09:01
15-Aug-2017 12:13
16-Aug-2017 11:13
17-Aug-2017 09:41
18-Aug-2017 08:10
21-Aug-2017 10:11

## 2017-08-21 NOTE — PROGRESS NOTE BEHAVIORAL HEALTH - PROBLEM SELECTOR PROBLEM 7
Diabetes mellitus

## 2017-08-21 NOTE — PROGRESS NOTE BEHAVIORAL HEALTH - NSBHADMITIPOBSFT_PSY_A_CORE
fall risk use of walker

## 2017-08-21 NOTE — PROGRESS NOTE BEHAVIORAL HEALTH - PROBLEM SELECTOR PLAN 3
w/ comorbid anxiety  - continue Lexapro 10 mg qd  - continue Gabapentin 300 mg po TID for anxiety  - continue Seroquel 150 mg po qhs  - discharge planning for this afternoon

## 2017-08-21 NOTE — PROGRESS NOTE BEHAVIORAL HEALTH - PROBLEM SELECTOR PLAN 2
- consider also psuedodementia of depression.  - continue lower dose Methadone 50 mg qd and low dose Klonopin 0.5 mg po TID  - Benzodiazepine withdrawal precautions/CIWA protocol  - consider Clonidine for outpatient treatment of symptoms of opiate withdrawal

## 2017-08-21 NOTE — PROGRESS NOTE BEHAVIORAL HEALTH - PERCEPTIONS
No abnormalities

## 2017-08-21 NOTE — PROGRESS NOTE BEHAVIORAL HEALTH - NSBHFUPINTERVALCCFT_PSY_A_CORE
Called for HTN of >180 systolic. Examined patient and he denied dizziness, headache, blurry vision, or other symptoms. Spoke with Medicine who said to give him his metoprolol for the evening and recheck later. Patient's BP subsequently came down to 150's systolic. NTD tonight - primary team to follow up with Medicine tomorrow. Rest of plan per primary team.
anxiety, panic and phlegm
"I'm not too good"
"I'm still feeling anxious"
"I'm not too good."
Wants to go home eventually  "Am I going home today?"   Says "not too good" when asked how he was doing in general.  Not conversational however.
"No changes over the weekend"
"I'm eating a little better"

## 2017-08-21 NOTE — PROGRESS NOTE BEHAVIORAL HEALTH - NSBHADMITIPREASONDETAILS_PSY_A_CORE FT
unable to care for self
discharge planning for this afternoon with increased home health aide supervision and adult day care.
unable to care for self

## 2017-08-24 DIAGNOSIS — Z91.81 HISTORY OF FALLING: ICD-10-CM

## 2017-08-24 DIAGNOSIS — B19.20 UNSPECIFIED VIRAL HEPATITIS C WITHOUT HEPATIC COMA: ICD-10-CM

## 2017-08-24 DIAGNOSIS — Z91.14 PATIENT'S OTHER NONCOMPLIANCE WITH MEDICATION REGIMEN: ICD-10-CM

## 2017-08-24 DIAGNOSIS — R26.81 UNSTEADINESS ON FEET: ICD-10-CM

## 2017-08-24 DIAGNOSIS — K86.1 OTHER CHRONIC PANCREATITIS: ICD-10-CM

## 2017-08-24 DIAGNOSIS — R45.851 SUICIDAL IDEATIONS: ICD-10-CM

## 2017-08-24 DIAGNOSIS — S00.83XA CONTUSION OF OTHER PART OF HEAD, INITIAL ENCOUNTER: ICD-10-CM

## 2017-08-24 DIAGNOSIS — E11.9 TYPE 2 DIABETES MELLITUS WITHOUT COMPLICATIONS: ICD-10-CM

## 2017-08-24 DIAGNOSIS — R13.10 DYSPHAGIA, UNSPECIFIED: ICD-10-CM

## 2017-08-24 DIAGNOSIS — F41.9 ANXIETY DISORDER, UNSPECIFIED: ICD-10-CM

## 2017-08-24 DIAGNOSIS — J69.0 PNEUMONITIS DUE TO INHALATION OF FOOD AND VOMIT: ICD-10-CM

## 2017-08-24 DIAGNOSIS — K74.60 UNSPECIFIED CIRRHOSIS OF LIVER: ICD-10-CM

## 2017-08-24 DIAGNOSIS — I12.9 HYPERTENSIVE CHRONIC KIDNEY DISEASE WITH STAGE 1 THROUGH STAGE 4 CHRONIC KIDNEY DISEASE, OR UNSPECIFIED CHRONIC KIDNEY DISEASE: ICD-10-CM

## 2017-08-24 DIAGNOSIS — E46 UNSPECIFIED PROTEIN-CALORIE MALNUTRITION: ICD-10-CM

## 2017-08-24 DIAGNOSIS — N18.9 CHRONIC KIDNEY DISEASE, UNSPECIFIED: ICD-10-CM

## 2017-08-24 DIAGNOSIS — Z56.0 UNEMPLOYMENT, UNSPECIFIED: ICD-10-CM

## 2017-08-24 DIAGNOSIS — F11.229 OPIOID DEPENDENCE WITH INTOXICATION, UNSPECIFIED: ICD-10-CM
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
## 2017-08-24 DIAGNOSIS — Z87.891 PERSONAL HISTORY OF NICOTINE DEPENDENCE: ICD-10-CM

## 2017-08-24 DIAGNOSIS — F32.9 MAJOR DEPRESSIVE DISORDER, SINGLE EPISODE, UNSPECIFIED: ICD-10-CM
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
## 2017-08-24 DIAGNOSIS — G92 TOXIC ENCEPHALOPATHY: ICD-10-CM

## 2017-08-24 DIAGNOSIS — Z91.5 PERSONAL HISTORY OF SELF-HARM: ICD-10-CM
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
## 2017-08-24 SDOH — ECONOMIC STABILITY - INCOME SECURITY: UNEMPLOYMENT, UNSPECIFIED: Z56.0

## 2018-01-22 ENCOUNTER — OUTPATIENT (OUTPATIENT)
Dept: OUTPATIENT SERVICES | Facility: HOSPITAL | Age: 68
LOS: 1 days | Discharge: HOME | End: 2018-01-22

## 2018-01-22 DIAGNOSIS — I45.81 LONG QT SYNDROME: ICD-10-CM

## 2018-01-23 ENCOUNTER — OUTPATIENT (OUTPATIENT)
Dept: OUTPATIENT SERVICES | Facility: HOSPITAL | Age: 68
LOS: 1 days | Discharge: HOME | End: 2018-01-23

## 2018-01-23 DIAGNOSIS — F11.20 OPIOID DEPENDENCE, UNCOMPLICATED: ICD-10-CM

## 2018-03-01 ENCOUNTER — OUTPATIENT (OUTPATIENT)
Dept: OUTPATIENT SERVICES | Facility: HOSPITAL | Age: 68
LOS: 1 days | Discharge: HOME | End: 2018-03-01

## 2018-03-01 DIAGNOSIS — I45.81 LONG QT SYNDROME: ICD-10-CM

## 2018-07-06 ENCOUNTER — OUTPATIENT (OUTPATIENT)
Dept: OUTPATIENT SERVICES | Facility: HOSPITAL | Age: 68
LOS: 1 days | Discharge: HOME | End: 2018-07-06

## 2018-07-06 DIAGNOSIS — F11.20 OPIOID DEPENDENCE, UNCOMPLICATED: ICD-10-CM

## 2018-12-07 ENCOUNTER — OUTPATIENT (OUTPATIENT)
Dept: OUTPATIENT SERVICES | Facility: HOSPITAL | Age: 68
LOS: 1 days | Discharge: HOME | End: 2018-12-07

## 2018-12-07 DIAGNOSIS — I45.81 LONG QT SYNDROME: ICD-10-CM

## 2018-12-07 PROBLEM — B19.20 UNSPECIFIED VIRAL HEPATITIS C WITHOUT HEPATIC COMA: Chronic | Status: ACTIVE | Noted: 2017-08-01

## 2018-12-07 PROBLEM — F11.20 OPIOID DEPENDENCE, UNCOMPLICATED: Chronic | Status: ACTIVE | Noted: 2017-08-01

## 2018-12-07 PROBLEM — K74.60 UNSPECIFIED CIRRHOSIS OF LIVER: Chronic | Status: ACTIVE | Noted: 2017-08-01

## 2018-12-07 PROBLEM — N18.9 CHRONIC KIDNEY DISEASE, UNSPECIFIED: Chronic | Status: ACTIVE | Noted: 2017-08-01

## 2020-03-30 NOTE — H&P ADULT - HISTORY OF PRESENT ILLNESS
65yo male PMHx chronic methadone use in the setting of previous heroin abuse, Hep C (context of diagnosis unknown), recently diagnosed liver cirrhosis, CKD, and mild dementia who presented as a transfer from [facility] ED with history of a fall and altered mentation, agitation, and paranoia. In the last week, he has been to 3 EDs incl [facility] and  seeking Methadone. Per his Methadone Clinic, he receives a dose of 220 mg daily, but, in the setting of liver disease, the dose should be lowered to 210 mg. Patient has not received Methadone since July 27th. Patient's son relates that he reported his father as a missing person to the Police, who were present at bedside in the ED. Patient's son is a nurse at \[employer]\"".      In the ED, vitals as follows: T 98F | HR 86 bpm | /67 mmHg | RR 18/min | SpO2 98% RA   Patient denied examination and was verbally abusive to staff. Patient has a tremor that he relays has been presents for the last 6 days.   Labs significant for raised PT/INR 13.0/1.18, BUN 30, Cr 1.6, Albumin 3.2, Raised Bili 1.8, AST 82, ALT 87, GFR 44.   Per UA, presence of bilirubin, protein, and bacteria.     Patient received 2 mg Xanax, 2mg Ativan, 100mg Thiamine, and 2L NS in the ED and was transferred to 7 Wo. normal S1, S2 heard 67yo male PMHx chronic methadone use in the setting of previous heroin abuse, Hep C (context of diagnosis unknown), recently diagnosed liver cirrhosis, CKD, and mild dementia who presented as a transfer from [facility] ED with history of a fall and altered mentation, agitation, and paranoia. He reportedly fell from standing, despite being supported by a rolling walker. He was found sleeping on the street by EMS. In the last week, he has been to 3 EDs incl [facility] and  seeking Methadone. Per his Methadone Clinic, he receives a dose of 220 mg daily, but, in the setting of liver disease, the dose should be lowered to 210 mg. Patient has not received Methadone since July 27th. Patient's son relates that he reported his father as a missing person to the Police, who were present at bedside in the ED. Patient's son is a nurse at [employer].      In the ED, vitals as follows: T 98F | HR 86 bpm | /67 mmHg | RR 18/min | SpO2 98% RA   Patient denied examination and was verbally abusive to staff. Patient has a tremor that he relays has been presents for the last 6 days.   Labs significant for raised PT/INR 13.0/1.18, BUN 30, Cr 1.6, Albumin 3.2, Raised Bili 1.8, AST 82, ALT 87, GFR 44.   Per UA, presence of bilirubin, protein, and bacteria.     Patient received 2 mg Xanax, 2mg Ativan, 100mg Thiamine, and 2L NS in the ED and was transferred to 7 . 67yo male PMHx chronic methadone use in the setting of previous heroin abuse, Hep C (context of diagnosis unknown, untreated), recently diagnosed liver cirrhosis, CKD, and mild dementia who presented as a transfer from [facility] ED with history of a fall and altered mentation, agitation, and paranoia. He reportedly fell from standing, despite being supported by a rolling walker. He was found sleeping on the street by EMS. In the last week, he has been to 3 EDs incl [place] and  seeking Methadone. Per his Methadone Clinic, he receives a dose of 220 mg daily, but, in the setting of liver disease, the dose should be lowered to 210 mg. Patient has not received Methadone since July 27th. Patient's son relates that he reported his father as a missing person to the Police, who were present at bedside in the ED. Patient's son is a nurse at [employer]. Per collateral, patient has been increasingly confused, agitated in the last two weeks. Son is due to see patient later today, will check in at the nurse's station.       In the ED, vitals as follows: T 98F | HR 86 bpm | /67 mmHg | RR 18/min | SpO2 98% RA   Patient denied examination and was verbally abusive to staff. Patient has a tremor that he relays has been presents for the last 6 days.   Labs significant for raised PT/INR 13.0/1.18, BUN 30, Cr 1.6, Albumin 3.2, Raised Bili 1.8, AST 82, ALT 87, GFR 44.   Per UA, presence of bilirubin, protein, and bacteria.     Patient received 2 mg Xanax, 2mg Ativan, 100mg Thiamine, and 2L NS in the ED and was transferred to 7 Wo.   While on the floors, patient attempted to leave AMA twice, and 2 Code [place] were called.

## 2020-09-06 NOTE — PROGRESS NOTE BEHAVIORAL HEALTH - NSBHCHARTREVIEWLAB_PSY_A_CORE FT
Strong peripheral pulses
Hb/Hct low since admission but improving: Hb/Hct on 08/09 11.8/34/1 (on 08/03 9.3/27.3); platelet count WNL; coags 08/03 WNL  08/07: Utox positive for methadone  08/03 Vitamin B12 1589, Folate 15.0  AST/ALT 63/52  RPR negative  08/02: HbA1C 6.2  08/02: TSH 0.549    CBC 08/09   WBC Count 6.8 K/uL  Range: 3.8 - 10.5 K/uL    RBC Count 3.75 M/uL (Low)  Range: 4.20 - 5.80 M/uL    Hemoglobin 11.8 g/dL (Low)  Range: 13.0 - 17.0 g/dL    Hematocrit 34.1 % (Low)  Range: 39.0 - 50.0 %    Mean Cell Volume 90.9 fL  Range: 80.0 - 100.0 fL    Mean Cell Hemoglobin 31.5 pg  Range: 27.0 - 34.0 pg    Mean Cell Hemoglobin Conc 34.6 g/dL  Range: 32.0 - 36.0 g/dL    Red Cell Distrib Width 14.0 %  Range: 10.3 - 16.9 %    Platelet Count - Automated 240 K/uL  Range: 150 - 400 K/uL      BMP 08/09      Sodium, Serum 130 mmol/L (Low)  Range: 135 - 145 mmol/L    Potassium, Serum 4.2 mmol/L  Range: 3.5 - 5.3 mmol/L    Chloride, Serum 93 mmol/L (Low)  Range: 96 - 108 mmol/L    Carbon Dioxide, Serum 24 mmol/L  Range: 22 - 31 mmol/L    Anion Gap, Serum 13 mmol/L  Range: 5 - 17 mmol/L    Blood Urea Nitrogen, Serum 22 mg/dL  Range: 7 - 23 mg/dL    Creatinine, Serum 1.10 mg/dL  Range: 0.50 - 1.30 mg/dL    Glucose, Serum 139 mg/dL (High)  Range: 70 - 99 mg/dL    Calcium, Total Serum 9.4 mg/dL  Range: 8.4 - 10.5 mg/dL    eGFR if Non African American 70 mL/min/1.73M2       11.2   8.1   )-----------( 218      ( 01 Aug 2017 02:59 )             31.6   08-01    138  |  102  |  30<H>  ----------------------------<  213<H>  4.4   |  28  |  1.62<H>    Ca    9.8      01 Aug 2017 02:59  Mg     1.3     08-01    TPro  7.6  /  Alb  3.2<L>  /  TBili  1.8<H>  /  DBili  x   /  AST  82<H>  /  ALT  87<H>  /  AlkPhos  56  08-01    alcohol level negative
ok
Hb/Hct low since admission but improving: Hb/Hct on 08/09 11.8/34/1 (on 08/03 9.3/27.3); platelet count WNL; coags 08/03 WNL  08/07: Utox positive for methadone  08/03 Vitamin B12 1589, Folate 15.0  AST/ALT 63/52  RPR negative  08/02: HbA1C 6.2  08/02: TSH 0.549    CBC 08/09   WBC Count 6.8 K/uL  Range: 3.8 - 10.5 K/uL    RBC Count 3.75 M/uL (Low)  Range: 4.20 - 5.80 M/uL    Hemoglobin 11.8 g/dL (Low)  Range: 13.0 - 17.0 g/dL    Hematocrit 34.1 % (Low)  Range: 39.0 - 50.0 %    Mean Cell Volume 90.9 fL  Range: 80.0 - 100.0 fL    Mean Cell Hemoglobin 31.5 pg  Range: 27.0 - 34.0 pg    Mean Cell Hemoglobin Conc 34.6 g/dL  Range: 32.0 - 36.0 g/dL    Red Cell Distrib Width 14.0 %  Range: 10.3 - 16.9 %    Platelet Count - Automated 240 K/uL  Range: 150 - 400 K/uL      BMP 08/09      Sodium, Serum 130 mmol/L (Low)  Range: 135 - 145 mmol/L    Potassium, Serum 4.2 mmol/L  Range: 3.5 - 5.3 mmol/L    Chloride, Serum 93 mmol/L (Low)  Range: 96 - 108 mmol/L    Carbon Dioxide, Serum 24 mmol/L  Range: 22 - 31 mmol/L    Anion Gap, Serum 13 mmol/L  Range: 5 - 17 mmol/L    Blood Urea Nitrogen, Serum 22 mg/dL  Range: 7 - 23 mg/dL    Creatinine, Serum 1.10 mg/dL  Range: 0.50 - 1.30 mg/dL    Glucose, Serum 139 mg/dL (High)  Range: 70 - 99 mg/dL    Calcium, Total Serum 9.4 mg/dL  Range: 8.4 - 10.5 mg/dL    eGFR if Non African American 70 mL/min/1.73M2       11.2   8.1   )-----------( 218      ( 01 Aug 2017 02:59 )             31.6   08-01    138  |  102  |  30<H>  ----------------------------<  213<H>  4.4   |  28  |  1.62<H>    Ca    9.8      01 Aug 2017 02:59  Mg     1.3     08-01    TPro  7.6  /  Alb  3.2<L>  /  TBili  1.8<H>  /  DBili  x   /  AST  82<H>  /  ALT  87<H>  /  AlkPhos  56  08-01    alcohol level negative

## 2020-11-20 NOTE — BEHAVIORAL HEALTH ASSESSMENT NOTE - NS ED [facility] MED ROS NEUROLOGICAL
[Coordination Grossly Intact] : coordination grossly intact [Normal Gait] : normal gait [Normal Affect] : the affect was normal [Normal Insight/Judgement] : insight and judgment were intact [General Appearance - Alert] : alert [General Appearance - In No Acute Distress] : in no acute distress [Sclera] : the sclera and conjunctiva were normal [PERRL With Normal Accommodation] : pupils were equal in size, round, and reactive to light [Extraocular Movements] : extraocular movements were intact [Outer Ear] : the ears and nose were normal in appearance [Oropharynx] : the oropharynx was normal [Neck Appearance] : the appearance of the neck was normal [Neck Cervical Mass (___cm)] : no neck mass was observed [Jugular Venous Distention Increased] : there was no jugular-venous distention [Thyroid Diffuse Enlargement] : the thyroid was not enlarged [Thyroid Nodule] : there were no palpable thyroid nodules [Auscultation Breath Sounds / Voice Sounds] : lungs were clear to auscultation bilaterally [Heart Rate And Rhythm] : heart rate was normal and rhythm regular [Heart Sounds] : normal S1 and S2 [Heart Sounds Gallop] : no gallops [Murmurs] : no murmurs [Heart Sounds Pericardial Friction Rub] : no pericardial rub [Arterial Pulses Carotid] : carotid pulses were normal with no bruits [Edema] : there was no peripheral edema [Bowel Sounds] : normal bowel sounds [Abdomen Soft] : soft [Abdomen Tenderness] : non-tender [] : no hepato-splenomegaly [Abdomen Mass (___ Cm)] : no abdominal mass palpated [Cervical Lymph Nodes Enlarged Posterior Bilaterally] : posterior cervical [Cervical Lymph Nodes Enlarged Anterior Bilaterally] : anterior cervical [Supraclavicular Lymph Nodes Enlarged Bilaterally] : supraclavicular [No CVA Tenderness] : no ~M costovertebral angle tenderness [Nail Clubbing] : no clubbing  or cyanosis of the fingernails [FreeTextEntry1] : Several hyperpigmented nevi are noted Unable to assess

## 2022-08-25 NOTE — PROGRESS NOTE BEHAVIORAL HEALTH - PROBLEM SELECTOR PLAN 4
Patient notified of information below and verbalized understanding.  
As per medicine

## 2024-05-28 NOTE — PATIENT PROFILE ADULT. - NSCAGESTDRUGANNOY_GEN_A_CORE_SD
OAC,     Result letter sent, health history and health maintenance updated, and recall entered.     no

## 2024-09-10 NOTE — PROGRESS NOTE ADULT - PROBLEM SELECTOR PLAN 8
Get the labs done fasting at the end of October  Good job joining a gym!!   Keep working on a lower carb diet.   Goal to lose 6 lbs prior to your three month follow up/ wellness visit.     Nutritional consult
